# Patient Record
Sex: FEMALE | Race: WHITE | NOT HISPANIC OR LATINO | Employment: UNEMPLOYED | URBAN - METROPOLITAN AREA
[De-identification: names, ages, dates, MRNs, and addresses within clinical notes are randomized per-mention and may not be internally consistent; named-entity substitution may affect disease eponyms.]

---

## 2021-01-01 ENCOUNTER — HOSPITAL ENCOUNTER (OUTPATIENT)
Dept: GENERAL RADIOLOGY | Facility: HOSPITAL | Age: 51
Discharge: HOME | End: 2021-06-16

## 2021-01-01 ENCOUNTER — TELEPHONE (OUTPATIENT)
Dept: GYNECOLOGIC ONCOLOGY | Facility: CLINIC | Age: 51
End: 2021-01-01

## 2021-01-01 ENCOUNTER — HOSPITAL ENCOUNTER (OUTPATIENT)
Dept: ONCOLOGY | Facility: HOSPITAL | Age: 51
Setting detail: INFUSION SERIES
Discharge: HOME | End: 2021-08-13

## 2021-01-01 ENCOUNTER — PATIENT MESSAGE (OUTPATIENT)
Dept: GYNECOLOGIC ONCOLOGY | Facility: CLINIC | Age: 51
End: 2021-01-01

## 2021-01-01 ENCOUNTER — HOSPITAL ENCOUNTER (OUTPATIENT)
Dept: RADIATION ONCOLOGY | Facility: HOSPITAL | Age: 51
Discharge: HOME | End: 2021-07-20

## 2021-01-01 ENCOUNTER — HOSPITAL ENCOUNTER (OUTPATIENT)
Dept: RADIATION ONCOLOGY | Facility: HOSPITAL | Age: 51
Discharge: HOME | End: 2021-07-26

## 2021-01-01 ENCOUNTER — HOSPITAL ENCOUNTER (OUTPATIENT)
Dept: RADIATION ONCOLOGY | Facility: HOSPITAL | Age: 51
Discharge: HOME | End: 2021-07-21

## 2021-01-01 ENCOUNTER — DOCUMENTATION (OUTPATIENT)
Dept: GYNECOLOGIC ONCOLOGY | Facility: CLINIC | Age: 51
End: 2021-01-01

## 2021-01-01 ENCOUNTER — APPOINTMENT (OUTPATIENT)
Dept: GENERAL RADIOLOGY | Facility: HOSPITAL | Age: 51
End: 2021-01-01

## 2021-01-01 ENCOUNTER — HOSPITAL ENCOUNTER (OUTPATIENT)
Dept: RADIATION ONCOLOGY | Facility: HOSPITAL | Age: 51
Setting detail: RADIATION/ONCOLOGY SERIES
Discharge: HOME | End: 2021-07-12

## 2021-01-01 ENCOUNTER — HOSPITAL ENCOUNTER (OUTPATIENT)
Dept: RADIATION ONCOLOGY | Facility: HOSPITAL | Age: 51
Discharge: HOME | End: 2021-07-29

## 2021-01-01 ENCOUNTER — HOSPITAL ENCOUNTER (EMERGENCY)
Facility: HOSPITAL | Age: 51
Discharge: HOME | End: 2021-06-24
Attending: EMERGENCY MEDICINE | Admitting: EMERGENCY MEDICINE

## 2021-01-01 ENCOUNTER — HOSPITAL ENCOUNTER (OUTPATIENT)
Dept: RADIATION ONCOLOGY | Facility: HOSPITAL | Age: 51
Discharge: HOME | End: 2021-07-28

## 2021-01-01 ENCOUNTER — HOSPITAL ENCOUNTER (OUTPATIENT)
Dept: ONCOLOGY | Facility: HOSPITAL | Age: 51
Setting detail: INFUSION SERIES
Discharge: HOME | End: 2021-08-09

## 2021-01-01 ENCOUNTER — APPOINTMENT (OUTPATIENT)
Dept: CT IMAGING | Facility: HOSPITAL | Age: 51
End: 2021-01-01

## 2021-01-01 ENCOUNTER — OFFICE VISIT (OUTPATIENT)
Dept: GYNECOLOGIC ONCOLOGY | Facility: CLINIC | Age: 51
End: 2021-01-01

## 2021-01-01 ENCOUNTER — HOSPITAL ENCOUNTER (OUTPATIENT)
Dept: RADIATION ONCOLOGY | Facility: HOSPITAL | Age: 51
Discharge: HOME | End: 2021-07-12

## 2021-01-01 ENCOUNTER — ANESTHESIA EVENT (OUTPATIENT)
Dept: PERIOP | Facility: HOSPITAL | Age: 51
End: 2021-01-01

## 2021-01-01 ENCOUNTER — EDUCATION (OUTPATIENT)
Dept: ONCOLOGY | Facility: HOSPITAL | Age: 51
End: 2021-01-01

## 2021-01-01 ENCOUNTER — HOSPITAL ENCOUNTER (INPATIENT)
Facility: HOSPITAL | Age: 51
LOS: 1 days | Discharge: HOME | End: 2021-06-18
Attending: OBSTETRICS & GYNECOLOGY | Admitting: OBSTETRICS & GYNECOLOGY

## 2021-01-01 ENCOUNTER — LAB (OUTPATIENT)
Dept: LAB | Facility: HOSPITAL | Age: 51
End: 2021-01-01

## 2021-01-01 ENCOUNTER — HOSPITAL ENCOUNTER (OUTPATIENT)
Dept: RADIATION ONCOLOGY | Facility: HOSPITAL | Age: 51
Discharge: HOME | End: 2021-07-19

## 2021-01-01 ENCOUNTER — HOSPITAL ENCOUNTER (OUTPATIENT)
Dept: ONCOLOGY | Facility: HOSPITAL | Age: 51
Setting detail: INFUSION SERIES
Discharge: HOME | End: 2021-08-05

## 2021-01-01 ENCOUNTER — ANESTHESIA (OUTPATIENT)
Dept: PERIOP | Facility: HOSPITAL | Age: 51
End: 2021-01-01

## 2021-01-01 ENCOUNTER — HOSPITAL ENCOUNTER (OUTPATIENT)
Dept: RADIATION ONCOLOGY | Facility: HOSPITAL | Age: 51
Discharge: HOME | End: 2021-07-27

## 2021-01-01 ENCOUNTER — HOSPITAL ENCOUNTER (OUTPATIENT)
Dept: ONCOLOGY | Facility: HOSPITAL | Age: 51
Setting detail: INFUSION SERIES
Discharge: HOME | End: 2021-07-15

## 2021-01-01 ENCOUNTER — HOSPITAL ENCOUNTER (OUTPATIENT)
Dept: ONCOLOGY | Facility: HOSPITAL | Age: 51
Discharge: HOME | End: 2021-07-12
Admitting: OBSTETRICS & GYNECOLOGY

## 2021-01-01 ENCOUNTER — HOSPITAL ENCOUNTER (OUTPATIENT)
Dept: ONCOLOGY | Facility: HOSPITAL | Age: 51
Setting detail: INFUSION SERIES
Discharge: HOME | End: 2021-08-12

## 2021-01-01 ENCOUNTER — PRE-ADMISSION TESTING (OUTPATIENT)
Dept: PREADMISSION TESTING | Facility: HOSPITAL | Age: 51
End: 2021-01-01

## 2021-01-01 ENCOUNTER — TELEPHONE (OUTPATIENT)
Dept: RADIATION ONCOLOGY | Facility: HOSPITAL | Age: 51
End: 2021-01-01

## 2021-01-01 ENCOUNTER — HOSPITAL ENCOUNTER (OUTPATIENT)
Dept: RADIATION ONCOLOGY | Facility: HOSPITAL | Age: 51
Discharge: HOME | End: 2021-07-22

## 2021-01-01 ENCOUNTER — DOCUMENTATION (OUTPATIENT)
Dept: NUTRITION | Facility: HOSPITAL | Age: 51
End: 2021-01-01

## 2021-01-01 ENCOUNTER — APPOINTMENT (OUTPATIENT)
Dept: MRI IMAGING | Facility: HOSPITAL | Age: 51
End: 2021-01-01

## 2021-01-01 ENCOUNTER — READMISSION MANAGEMENT (OUTPATIENT)
Dept: CALL CENTER | Facility: HOSPITAL | Age: 51
End: 2021-01-01

## 2021-01-01 ENCOUNTER — HOSPITAL ENCOUNTER (OUTPATIENT)
Dept: CT IMAGING | Facility: HOSPITAL | Age: 51
Discharge: HOME | End: 2021-07-02
Admitting: OBSTETRICS & GYNECOLOGY

## 2021-01-01 ENCOUNTER — HOSPITAL ENCOUNTER (OUTPATIENT)
Dept: RADIATION ONCOLOGY | Facility: HOSPITAL | Age: 51
Discharge: HOME | End: 2021-07-23

## 2021-01-01 ENCOUNTER — HOSPITAL ENCOUNTER (OUTPATIENT)
Dept: RADIATION ONCOLOGY | Facility: HOSPITAL | Age: 51
Setting detail: RADIATION/ONCOLOGY SERIES
Discharge: HOME | End: 2021-08-01

## 2021-01-01 ENCOUNTER — APPOINTMENT (OUTPATIENT)
Dept: PREADMISSION TESTING | Facility: HOSPITAL | Age: 51
End: 2021-01-01

## 2021-01-01 ENCOUNTER — HOSPITAL ENCOUNTER (INPATIENT)
Facility: HOSPITAL | Age: 51
LOS: 5 days | Discharge: HOME | End: 2021-08-24
Attending: INTERNAL MEDICINE | Admitting: HOSPITALIST

## 2021-01-01 ENCOUNTER — HOSPITAL ENCOUNTER (OUTPATIENT)
Dept: ONCOLOGY | Facility: HOSPITAL | Age: 51
Setting detail: INFUSION SERIES
Discharge: HOME | End: 2021-08-06

## 2021-01-01 ENCOUNTER — HOSPITAL ENCOUNTER (OUTPATIENT)
Dept: ONCOLOGY | Facility: HOSPITAL | Age: 51
Setting detail: INFUSION SERIES
End: 2021-01-01

## 2021-01-01 ENCOUNTER — OFFICE VISIT (OUTPATIENT)
Dept: RADIATION ONCOLOGY | Facility: HOSPITAL | Age: 51
End: 2021-01-01

## 2021-01-01 ENCOUNTER — TELEPHONE (OUTPATIENT)
Dept: NEUROLOGY | Facility: CLINIC | Age: 51
End: 2021-01-01

## 2021-01-01 ENCOUNTER — TELEPHONE (OUTPATIENT)
Dept: SOCIAL WORK | Facility: HOSPITAL | Age: 51
End: 2021-01-01

## 2021-01-01 VITALS
WEIGHT: 221.3 LBS | DIASTOLIC BLOOD PRESSURE: 68 MMHG | BODY MASS INDEX: 32.78 KG/M2 | SYSTOLIC BLOOD PRESSURE: 121 MMHG | OXYGEN SATURATION: 97 % | RESPIRATION RATE: 18 BRPM | HEIGHT: 69 IN | HEART RATE: 110 BPM | TEMPERATURE: 97.8 F

## 2021-01-01 VITALS
HEIGHT: 69 IN | WEIGHT: 202 LBS | BODY MASS INDEX: 29.92 KG/M2 | HEART RATE: 89 BPM | TEMPERATURE: 98 F | SYSTOLIC BLOOD PRESSURE: 120 MMHG | OXYGEN SATURATION: 98 % | RESPIRATION RATE: 16 BRPM | DIASTOLIC BLOOD PRESSURE: 69 MMHG

## 2021-01-01 VITALS
HEIGHT: 69 IN | RESPIRATION RATE: 18 BRPM | SYSTOLIC BLOOD PRESSURE: 128 MMHG | TEMPERATURE: 97.6 F | DIASTOLIC BLOOD PRESSURE: 69 MMHG | BODY MASS INDEX: 29.92 KG/M2 | WEIGHT: 202 LBS | HEART RATE: 95 BPM

## 2021-01-01 VITALS
HEART RATE: 92 BPM | SYSTOLIC BLOOD PRESSURE: 138 MMHG | DIASTOLIC BLOOD PRESSURE: 76 MMHG | WEIGHT: 229 LBS | BODY MASS INDEX: 33.92 KG/M2 | HEIGHT: 69 IN | RESPIRATION RATE: 18 BRPM | TEMPERATURE: 96.6 F

## 2021-01-01 VITALS
RESPIRATION RATE: 14 BRPM | OXYGEN SATURATION: 99 % | DIASTOLIC BLOOD PRESSURE: 67 MMHG | HEART RATE: 83 BPM | HEIGHT: 69 IN | BODY MASS INDEX: 29.82 KG/M2 | TEMPERATURE: 95.7 F | SYSTOLIC BLOOD PRESSURE: 123 MMHG

## 2021-01-01 VITALS
OXYGEN SATURATION: 95 % | RESPIRATION RATE: 16 BRPM | HEART RATE: 103 BPM | SYSTOLIC BLOOD PRESSURE: 130 MMHG | HEIGHT: 69 IN | DIASTOLIC BLOOD PRESSURE: 74 MMHG | TEMPERATURE: 98 F | BODY MASS INDEX: 33.92 KG/M2 | WEIGHT: 229 LBS

## 2021-01-01 VITALS
TEMPERATURE: 97.9 F | SYSTOLIC BLOOD PRESSURE: 113 MMHG | HEART RATE: 87 BPM | DIASTOLIC BLOOD PRESSURE: 68 MMHG | RESPIRATION RATE: 18 BRPM

## 2021-01-01 VITALS
RESPIRATION RATE: 18 BRPM | SYSTOLIC BLOOD PRESSURE: 131 MMHG | OXYGEN SATURATION: 94 % | DIASTOLIC BLOOD PRESSURE: 68 MMHG | TEMPERATURE: 97.5 F | HEART RATE: 100 BPM | WEIGHT: 220.8 LBS | BODY MASS INDEX: 32.59 KG/M2

## 2021-01-01 VITALS
SYSTOLIC BLOOD PRESSURE: 144 MMHG | HEART RATE: 98 BPM | HEIGHT: 68 IN | RESPIRATION RATE: 18 BRPM | TEMPERATURE: 96.9 F | OXYGEN SATURATION: 98 % | WEIGHT: 238 LBS | BODY MASS INDEX: 36.07 KG/M2 | DIASTOLIC BLOOD PRESSURE: 95 MMHG

## 2021-01-01 VITALS
OXYGEN SATURATION: 97 % | HEIGHT: 69 IN | HEART RATE: 105 BPM | BODY MASS INDEX: 30.01 KG/M2 | RESPIRATION RATE: 18 BRPM | SYSTOLIC BLOOD PRESSURE: 119 MMHG | WEIGHT: 202.6 LBS | DIASTOLIC BLOOD PRESSURE: 66 MMHG | TEMPERATURE: 97.7 F

## 2021-01-01 VITALS
HEART RATE: 99 BPM | SYSTOLIC BLOOD PRESSURE: 126 MMHG | DIASTOLIC BLOOD PRESSURE: 71 MMHG | TEMPERATURE: 97.1 F | RESPIRATION RATE: 18 BRPM

## 2021-01-01 VITALS — BODY MASS INDEX: 33.99 KG/M2 | WEIGHT: 229.5 LBS | HEIGHT: 69 IN

## 2021-01-01 VITALS
RESPIRATION RATE: 18 BRPM | WEIGHT: 202 LBS | HEART RATE: 104 BPM | SYSTOLIC BLOOD PRESSURE: 126 MMHG | TEMPERATURE: 96.9 F | BODY MASS INDEX: 29.92 KG/M2 | OXYGEN SATURATION: 95 % | DIASTOLIC BLOOD PRESSURE: 72 MMHG | HEIGHT: 69 IN

## 2021-01-01 VITALS — WEIGHT: 210.5 LBS | BODY MASS INDEX: 30.05 KG/M2 | WEIGHT: 203.6 LBS | BODY MASS INDEX: 31.07 KG/M2

## 2021-01-01 VITALS
WEIGHT: 229 LBS | RESPIRATION RATE: 20 BRPM | HEIGHT: 69 IN | OXYGEN SATURATION: 94 % | TEMPERATURE: 98.8 F | SYSTOLIC BLOOD PRESSURE: 129 MMHG | DIASTOLIC BLOOD PRESSURE: 77 MMHG | HEART RATE: 108 BPM | BODY MASS INDEX: 33.92 KG/M2

## 2021-01-01 VITALS
HEIGHT: 69 IN | TEMPERATURE: 97.5 F | HEART RATE: 98 BPM | BODY MASS INDEX: 33.8 KG/M2 | SYSTOLIC BLOOD PRESSURE: 120 MMHG | DIASTOLIC BLOOD PRESSURE: 68 MMHG | RESPIRATION RATE: 16 BRPM

## 2021-01-01 VITALS
HEART RATE: 88 BPM | DIASTOLIC BLOOD PRESSURE: 65 MMHG | TEMPERATURE: 98 F | RESPIRATION RATE: 18 BRPM | SYSTOLIC BLOOD PRESSURE: 123 MMHG

## 2021-01-01 VITALS — HEART RATE: 91 BPM | SYSTOLIC BLOOD PRESSURE: 130 MMHG | DIASTOLIC BLOOD PRESSURE: 72 MMHG

## 2021-01-01 DIAGNOSIS — C54.1 ENDOMETRIAL CANCER (HCC): ICD-10-CM

## 2021-01-01 DIAGNOSIS — Z01.818 EXAMINATION PRIOR TO CHEMOTHERAPY: Primary | ICD-10-CM

## 2021-01-01 DIAGNOSIS — F41.9 ANXIETY: ICD-10-CM

## 2021-01-01 DIAGNOSIS — T45.1X5A ANEMIA DUE TO ANTINEOPLASTIC CHEMOTHERAPY: ICD-10-CM

## 2021-01-01 DIAGNOSIS — E86.0 DEHYDRATION: ICD-10-CM

## 2021-01-01 DIAGNOSIS — R42 DIZZINESS: ICD-10-CM

## 2021-01-01 DIAGNOSIS — C54.1 ENDOMETRIAL CANCER, FIGO STAGE IVB (HCC): ICD-10-CM

## 2021-01-01 DIAGNOSIS — N39.0 ACUTE UTI: Primary | ICD-10-CM

## 2021-01-01 DIAGNOSIS — I10 ESSENTIAL HYPERTENSION: ICD-10-CM

## 2021-01-01 DIAGNOSIS — C54.1 ENDOMETRIAL CANCER (HCC): Primary | ICD-10-CM

## 2021-01-01 DIAGNOSIS — F41.8 DEPRESSION WITH ANXIETY: ICD-10-CM

## 2021-01-01 DIAGNOSIS — D64.81 ANEMIA DUE TO ANTINEOPLASTIC CHEMOTHERAPY: ICD-10-CM

## 2021-01-01 DIAGNOSIS — G89.3 CANCER-RELATED PAIN: ICD-10-CM

## 2021-01-01 DIAGNOSIS — T45.1X5A ANEMIA DUE TO ANTINEOPLASTIC CHEMOTHERAPY: Primary | ICD-10-CM

## 2021-01-01 DIAGNOSIS — R53.1 GENERALIZED WEAKNESS: ICD-10-CM

## 2021-01-01 DIAGNOSIS — H53.9 VISION CHANGES: ICD-10-CM

## 2021-01-01 DIAGNOSIS — M79.89 SWELLING OF LOWER EXTREMITY: ICD-10-CM

## 2021-01-01 DIAGNOSIS — G89.3 CANCER RELATED PAIN: ICD-10-CM

## 2021-01-01 DIAGNOSIS — R13.10 DYSPHAGIA, UNSPECIFIED TYPE: ICD-10-CM

## 2021-01-01 DIAGNOSIS — E11.69 TYPE 2 DIABETES MELLITUS WITH OTHER SPECIFIED COMPLICATION, UNSPECIFIED WHETHER LONG TERM INSULIN USE (HCC): ICD-10-CM

## 2021-01-01 DIAGNOSIS — E11.69 TYPE 2 DIABETES MELLITUS WITH OTHER SPECIFIED COMPLICATION, UNSPECIFIED WHETHER LONG TERM INSULIN USE (HCC): Primary | ICD-10-CM

## 2021-01-01 DIAGNOSIS — Z79.4 TYPE 2 DIABETES MELLITUS WITHOUT COMPLICATION, WITH LONG-TERM CURRENT USE OF INSULIN (HCC): ICD-10-CM

## 2021-01-01 DIAGNOSIS — G89.18 POSTOPERATIVE PAIN: ICD-10-CM

## 2021-01-01 DIAGNOSIS — N95.1 MENOPAUSAL HOT FLUSHES: ICD-10-CM

## 2021-01-01 DIAGNOSIS — M79.10 MYALGIA: ICD-10-CM

## 2021-01-01 DIAGNOSIS — E43 SEVERE MALNUTRITION (HCC): ICD-10-CM

## 2021-01-01 DIAGNOSIS — E11.9 TYPE 2 DIABETES MELLITUS WITHOUT COMPLICATION, WITH LONG-TERM CURRENT USE OF INSULIN (HCC): ICD-10-CM

## 2021-01-01 DIAGNOSIS — T45.1X5A ALOPECIA DUE TO CYTOTOXIC DRUG: ICD-10-CM

## 2021-01-01 DIAGNOSIS — R62.7 FAILURE TO THRIVE IN ADULT: ICD-10-CM

## 2021-01-01 DIAGNOSIS — E83.42 HYPOMAGNESEMIA: ICD-10-CM

## 2021-01-01 DIAGNOSIS — D64.81 ANEMIA DUE TO ANTINEOPLASTIC CHEMOTHERAPY: Primary | ICD-10-CM

## 2021-01-01 DIAGNOSIS — R73.9 HYPERGLYCEMIA: ICD-10-CM

## 2021-01-01 DIAGNOSIS — D64.9 ACUTE ANEMIA: ICD-10-CM

## 2021-01-01 DIAGNOSIS — L65.8 ALOPECIA DUE TO CYTOTOXIC DRUG: ICD-10-CM

## 2021-01-01 DIAGNOSIS — G89.3 CANCER-RELATED PAIN: Primary | ICD-10-CM

## 2021-01-01 DIAGNOSIS — F51.02 ADJUSTMENT INSOMNIA: ICD-10-CM

## 2021-01-01 DIAGNOSIS — E87.6 HYPOKALEMIA: ICD-10-CM

## 2021-01-01 DIAGNOSIS — R63.4 WEIGHT LOSS: ICD-10-CM

## 2021-01-01 DIAGNOSIS — K59.00 CONSTIPATION, UNSPECIFIED CONSTIPATION TYPE: ICD-10-CM

## 2021-01-01 LAB
ABO GROUP BLD: NORMAL
ALBUMIN SERPL-MCNC: 2.8 G/DL (ref 3.5–5.2)
ALBUMIN SERPL-MCNC: 3 G/DL (ref 3.5–5.2)
ALBUMIN SERPL-MCNC: 3.1 G/DL (ref 3.5–5.2)
ALBUMIN SERPL-MCNC: 3.2 G/DL (ref 3.5–5.2)
ALBUMIN SERPL-MCNC: 3.3 G/DL (ref 3.5–5.2)
ALBUMIN SERPL-MCNC: 4.1 G/DL (ref 3.5–5.2)
ALBUMIN/GLOB SERPL: 0.9 G/DL
ALBUMIN/GLOB SERPL: 1 G/DL
ALBUMIN/GLOB SERPL: 1.5 G/DL
ALP SERPL-CCNC: 111 U/L (ref 39–117)
ALP SERPL-CCNC: 118 U/L (ref 39–117)
ALP SERPL-CCNC: 205 U/L (ref 39–117)
ALP SERPL-CCNC: 207 U/L (ref 39–117)
ALP SERPL-CCNC: 245 U/L (ref 39–117)
ALP SERPL-CCNC: 330 U/L (ref 39–117)
ALT SERPL W P-5'-P-CCNC: 14 U/L (ref 1–33)
ALT SERPL W P-5'-P-CCNC: 22 U/L (ref 1–33)
ALT SERPL W P-5'-P-CCNC: 7 U/L (ref 1–33)
ALT SERPL W P-5'-P-CCNC: 9 U/L (ref 1–33)
ANION GAP SERPL CALCULATED.3IONS-SCNC: 10 MMOL/L (ref 5–15)
ANION GAP SERPL CALCULATED.3IONS-SCNC: 11 MMOL/L (ref 5–15)
ANION GAP SERPL CALCULATED.3IONS-SCNC: 12 MMOL/L (ref 5–15)
ANION GAP SERPL CALCULATED.3IONS-SCNC: 12 MMOL/L (ref 5–15)
ANION GAP SERPL CALCULATED.3IONS-SCNC: 13 MMOL/L (ref 5–15)
ANION GAP SERPL CALCULATED.3IONS-SCNC: 14 MMOL/L (ref 5–15)
ANION GAP SERPL CALCULATED.3IONS-SCNC: 17 MMOL/L (ref 5–15)
ANION GAP SERPL CALCULATED.3IONS-SCNC: 17 MMOL/L (ref 5–15)
ANION GAP SERPL CALCULATED.3IONS-SCNC: 18 MMOL/L (ref 5–15)
ANION GAP SERPL CALCULATED.3IONS-SCNC: 19 MMOL/L (ref 5–15)
ANION GAP SERPL CALCULATED.3IONS-SCNC: 19 MMOL/L (ref 5–15)
ANION GAP SERPL CALCULATED.3IONS-SCNC: 8 MMOL/L (ref 5–15)
ANION GAP SERPL CALCULATED.3IONS-SCNC: 8 MMOL/L (ref 5–15)
ANION GAP SERPL CALCULATED.3IONS-SCNC: 9 MMOL/L (ref 5–15)
AST SERPL-CCNC: 13 U/L (ref 1–32)
AST SERPL-CCNC: 17 U/L (ref 1–32)
AST SERPL-CCNC: 18 U/L (ref 1–32)
AST SERPL-CCNC: 19 U/L (ref 1–32)
AST SERPL-CCNC: 22 U/L (ref 1–32)
AST SERPL-CCNC: 86 U/L (ref 1–32)
B-HCG UR QL: NEGATIVE
BACTERIA SPEC AEROBE CULT: NO GROWTH
BACTERIA SPEC AEROBE CULT: NORMAL
BACTERIA UR QL AUTO: ABNORMAL /HPF
BASOPHILS # BLD AUTO: 0.01 10*3/MM3 (ref 0–0.2)
BASOPHILS # BLD AUTO: 0.05 10*3/MM3 (ref 0–0.2)
BASOPHILS # BLD AUTO: 0.06 10*3/MM3 (ref 0–0.2)
BASOPHILS NFR BLD AUTO: 0.2 % (ref 0–1.5)
BASOPHILS NFR BLD AUTO: 0.4 % (ref 0–1.5)
BASOPHILS NFR BLD AUTO: 0.6 % (ref 0–1.5)
BH BB BLOOD EXPIRATION DATE: NORMAL
BH BB BLOOD TYPE BARCODE: 5100
BH BB BLOOD TYPE BARCODE: 9500
BH BB DISPENSE STATUS: NORMAL
BH BB PRODUCT CODE: NORMAL
BH BB UNIT NUMBER: NORMAL
BILIRUB SERPL-MCNC: 0.4 MG/DL (ref 0–1.2)
BILIRUB SERPL-MCNC: 0.6 MG/DL (ref 0–1.2)
BILIRUB SERPL-MCNC: 0.7 MG/DL (ref 0–1.2)
BILIRUB SERPL-MCNC: 0.8 MG/DL (ref 0–1.2)
BILIRUB SERPL-MCNC: 1.1 MG/DL (ref 0–1.2)
BILIRUB SERPL-MCNC: 1.4 MG/DL (ref 0–1.2)
BILIRUB UR QL STRIP: NEGATIVE
BLD GP AB SCN SERPL QL: NEGATIVE
BUN SERPL-MCNC: 10 MG/DL (ref 6–20)
BUN SERPL-MCNC: 11 MG/DL (ref 6–20)
BUN SERPL-MCNC: 12 MG/DL (ref 6–20)
BUN SERPL-MCNC: 13 MG/DL (ref 6–20)
BUN SERPL-MCNC: 18 MG/DL (ref 6–20)
BUN SERPL-MCNC: 7 MG/DL (ref 6–20)
BUN SERPL-MCNC: 8 MG/DL (ref 6–20)
BUN SERPL-MCNC: 9 MG/DL (ref 6–20)
BUN/CREAT SERPL: 13.5 (ref 7–25)
BUN/CREAT SERPL: 16.3 (ref 7–25)
BUN/CREAT SERPL: 16.3 (ref 7–25)
BUN/CREAT SERPL: 17.5 (ref 7–25)
BUN/CREAT SERPL: 17.6 (ref 7–25)
BUN/CREAT SERPL: 17.9 (ref 7–25)
BUN/CREAT SERPL: 17.9 (ref 7–25)
BUN/CREAT SERPL: 18.2 (ref 7–25)
BUN/CREAT SERPL: 19.4 (ref 7–25)
BUN/CREAT SERPL: 19.4 (ref 7–25)
BUN/CREAT SERPL: 21.2 (ref 7–25)
BUN/CREAT SERPL: 21.6 (ref 7–25)
BUN/CREAT SERPL: 22.5 (ref 7–25)
BUN/CREAT SERPL: 23.1 (ref 7–25)
BUN/CREAT SERPL: 25 (ref 7–25)
BUN/CREAT SERPL: 25 (ref 7–25)
BUN/CREAT SERPL: 26.5 (ref 7–25)
BUN/CREAT SERPL: 27 (ref 7–25)
BUN/CREAT SERPL: 28.6 (ref 7–25)
BUN/CREAT SERPL: 28.6 (ref 7–25)
BUN/CREAT SERPL: 32.7 (ref 7–25)
CA-I SERPL ISE-MCNC: 1.22 MMOL/L (ref 1.12–1.32)
CALCIUM SPEC-SCNC: 10.1 MG/DL (ref 8.6–10.5)
CALCIUM SPEC-SCNC: 8.1 MG/DL (ref 8.6–10.5)
CALCIUM SPEC-SCNC: 8.1 MG/DL (ref 8.6–10.5)
CALCIUM SPEC-SCNC: 8.2 MG/DL (ref 8.6–10.5)
CALCIUM SPEC-SCNC: 8.3 MG/DL (ref 8.6–10.5)
CALCIUM SPEC-SCNC: 8.4 MG/DL (ref 8.6–10.5)
CALCIUM SPEC-SCNC: 8.5 MG/DL (ref 8.6–10.5)
CALCIUM SPEC-SCNC: 8.5 MG/DL (ref 8.6–10.5)
CALCIUM SPEC-SCNC: 8.6 MG/DL (ref 8.6–10.5)
CALCIUM SPEC-SCNC: 8.6 MG/DL (ref 8.6–10.5)
CALCIUM SPEC-SCNC: 8.7 MG/DL (ref 8.6–10.5)
CALCIUM SPEC-SCNC: 8.9 MG/DL (ref 8.6–10.5)
CALCIUM SPEC-SCNC: 9.1 MG/DL (ref 8.6–10.5)
CALCIUM SPEC-SCNC: 9.2 MG/DL (ref 8.6–10.5)
CALCIUM SPEC-SCNC: 9.4 MG/DL (ref 8.6–10.5)
CANCER AG125 SERPL QL: 34.3 U/ML (ref 0–38.1)
CHLORIDE SERPL-SCNC: 100 MMOL/L (ref 98–107)
CHLORIDE SERPL-SCNC: 84 MMOL/L (ref 98–107)
CHLORIDE SERPL-SCNC: 85 MMOL/L (ref 98–107)
CHLORIDE SERPL-SCNC: 85 MMOL/L (ref 98–107)
CHLORIDE SERPL-SCNC: 86 MMOL/L (ref 98–107)
CHLORIDE SERPL-SCNC: 87 MMOL/L (ref 98–107)
CHLORIDE SERPL-SCNC: 87 MMOL/L (ref 98–107)
CHLORIDE SERPL-SCNC: 89 MMOL/L (ref 98–107)
CHLORIDE SERPL-SCNC: 89 MMOL/L (ref 98–107)
CHLORIDE SERPL-SCNC: 90 MMOL/L (ref 98–107)
CHLORIDE SERPL-SCNC: 91 MMOL/L (ref 98–107)
CHLORIDE SERPL-SCNC: 92 MMOL/L (ref 98–107)
CHLORIDE SERPL-SCNC: 96 MMOL/L (ref 98–107)
CK SERPL-CCNC: 53 U/L (ref 20–180)
CLARITY UR: ABNORMAL
CLARITY UR: CLEAR
CLARITY UR: CLEAR
CO2 SERPL-SCNC: 20 MMOL/L (ref 22–29)
CO2 SERPL-SCNC: 22 MMOL/L (ref 22–29)
CO2 SERPL-SCNC: 23 MMOL/L (ref 22–29)
CO2 SERPL-SCNC: 23 MMOL/L (ref 22–29)
CO2 SERPL-SCNC: 24 MMOL/L (ref 22–29)
CO2 SERPL-SCNC: 25 MMOL/L (ref 22–29)
CO2 SERPL-SCNC: 26 MMOL/L (ref 22–29)
CO2 SERPL-SCNC: 27 MMOL/L (ref 22–29)
CO2 SERPL-SCNC: 28 MMOL/L (ref 22–29)
CO2 SERPL-SCNC: 28 MMOL/L (ref 22–29)
CO2 SERPL-SCNC: 29 MMOL/L (ref 22–29)
COLOR UR: YELLOW
CREAT BLDA-MCNC: 0.5 MG/DL (ref 0.6–1.3)
CREAT BLDA-MCNC: 0.5 MG/DL (ref 0.6–1.3)
CREAT SERPL-MCNC: 0.33 MG/DL (ref 0.57–1)
CREAT SERPL-MCNC: 0.35 MG/DL (ref 0.57–1)
CREAT SERPL-MCNC: 0.36 MG/DL (ref 0.57–1)
CREAT SERPL-MCNC: 0.37 MG/DL (ref 0.57–1)
CREAT SERPL-MCNC: 0.37 MG/DL (ref 0.57–1)
CREAT SERPL-MCNC: 0.39 MG/DL (ref 0.57–1)
CREAT SERPL-MCNC: 0.4 MG/DL (ref 0.57–1)
CREAT SERPL-MCNC: 0.4 MG/DL (ref 0.57–1)
CREAT SERPL-MCNC: 0.42 MG/DL (ref 0.57–1)
CREAT SERPL-MCNC: 0.43 MG/DL (ref 0.57–1)
CREAT SERPL-MCNC: 0.43 MG/DL (ref 0.57–1)
CREAT SERPL-MCNC: 0.44 MG/DL (ref 0.57–1)
CREAT SERPL-MCNC: 0.49 MG/DL (ref 0.57–1)
CREAT SERPL-MCNC: 0.51 MG/DL (ref 0.57–1)
CREAT SERPL-MCNC: 0.52 MG/DL (ref 0.57–1)
CREAT SERPL-MCNC: 0.55 MG/DL (ref 0.57–1)
CREAT SERPL-MCNC: 0.55 MG/DL (ref 0.57–1)
CROSSMATCH INTERPRETATION: NORMAL
CYTO UR: NORMAL
D-LACTATE SERPL-SCNC: 2 MMOL/L (ref 0.5–2)
DEPRECATED RDW RBC AUTO: 39.4 FL (ref 37–54)
DEPRECATED RDW RBC AUTO: 39.5 FL (ref 37–54)
DEPRECATED RDW RBC AUTO: 50.5 FL (ref 37–54)
DEPRECATED RDW RBC AUTO: 53 FL (ref 37–54)
DEPRECATED RDW RBC AUTO: 53.1 FL (ref 37–54)
EOSINOPHIL # BLD AUTO: 0.05 10*3/MM3 (ref 0–0.4)
EOSINOPHIL # BLD AUTO: 0.13 10*3/MM3 (ref 0–0.4)
EOSINOPHIL # BLD AUTO: 0.19 10*3/MM3 (ref 0–0.4)
EOSINOPHIL NFR BLD AUTO: 1.1 % (ref 0.3–6.2)
EOSINOPHIL NFR BLD AUTO: 1.2 % (ref 0.3–6.2)
EOSINOPHIL NFR BLD AUTO: 1.8 % (ref 0.3–6.2)
ERYTHROCYTE [DISTWIDTH] IN BLOOD BY AUTOMATED COUNT: 13.1 % (ref 12.3–15.4)
ERYTHROCYTE [DISTWIDTH] IN BLOOD BY AUTOMATED COUNT: 13.3 % (ref 12.3–15.4)
ERYTHROCYTE [DISTWIDTH] IN BLOOD BY AUTOMATED COUNT: 15.9 % (ref 12.3–15.4)
ERYTHROCYTE [DISTWIDTH] IN BLOOD BY AUTOMATED COUNT: 17 % (ref 12.3–15.4)
ERYTHROCYTE [DISTWIDTH] IN BLOOD BY AUTOMATED COUNT: 17 % (ref 12.3–15.4)
ERYTHROCYTE [DISTWIDTH] IN BLOOD BY AUTOMATED COUNT: 17.6 % (ref 12.3–15.4)
ERYTHROCYTE [DISTWIDTH] IN BLOOD BY AUTOMATED COUNT: 19.3 % (ref 12.3–15.4)
ERYTHROCYTE [DISTWIDTH] IN BLOOD BY AUTOMATED COUNT: 20.4 % (ref 12.3–15.4)
ERYTHROCYTE [DISTWIDTH] IN BLOOD BY AUTOMATED COUNT: 22.3 % (ref 12.3–15.4)
FLUAV RNA RESP QL NAA+PROBE: NOT DETECTED
FLUBV RNA RESP QL NAA+PROBE: NOT DETECTED
GFR SERPL CREATININE-BSD FRML MDRD: 117 ML/MIN/1.73
GFR SERPL CREATININE-BSD FRML MDRD: 117 ML/MIN/1.73
GFR SERPL CREATININE-BSD FRML MDRD: 125 ML/MIN/1.73
GFR SERPL CREATININE-BSD FRML MDRD: 128 ML/MIN/1.73
GFR SERPL CREATININE-BSD FRML MDRD: 133 ML/MIN/1.73
GFR SERPL CREATININE-BSD FRML MDRD: >150 ML/MIN/1.73
GLOBULIN UR ELPH-MCNC: 2.7 GM/DL
GLOBULIN UR ELPH-MCNC: 3 GM/DL
GLOBULIN UR ELPH-MCNC: 3.1 GM/DL
GLOBULIN UR ELPH-MCNC: 3.4 GM/DL
GLOBULIN UR ELPH-MCNC: 3.6 GM/DL
GLOBULIN UR ELPH-MCNC: 3.7 GM/DL
GLUCOSE BLDC GLUCOMTR-MCNC: 153 MG/DL (ref 70–130)
GLUCOSE BLDC GLUCOMTR-MCNC: 172 MG/DL (ref 70–130)
GLUCOSE BLDC GLUCOMTR-MCNC: 172 MG/DL (ref 70–130)
GLUCOSE BLDC GLUCOMTR-MCNC: 173 MG/DL (ref 70–130)
GLUCOSE BLDC GLUCOMTR-MCNC: 176 MG/DL (ref 70–130)
GLUCOSE BLDC GLUCOMTR-MCNC: 182 MG/DL (ref 70–130)
GLUCOSE BLDC GLUCOMTR-MCNC: 192 MG/DL (ref 70–130)
GLUCOSE BLDC GLUCOMTR-MCNC: 193 MG/DL (ref 70–130)
GLUCOSE BLDC GLUCOMTR-MCNC: 195 MG/DL (ref 70–130)
GLUCOSE BLDC GLUCOMTR-MCNC: 195 MG/DL (ref 70–130)
GLUCOSE BLDC GLUCOMTR-MCNC: 198 MG/DL (ref 70–130)
GLUCOSE BLDC GLUCOMTR-MCNC: 201 MG/DL (ref 70–130)
GLUCOSE BLDC GLUCOMTR-MCNC: 202 MG/DL (ref 70–130)
GLUCOSE BLDC GLUCOMTR-MCNC: 204 MG/DL (ref 70–130)
GLUCOSE BLDC GLUCOMTR-MCNC: 211 MG/DL (ref 70–130)
GLUCOSE BLDC GLUCOMTR-MCNC: 212 MG/DL (ref 70–130)
GLUCOSE BLDC GLUCOMTR-MCNC: 214 MG/DL (ref 70–130)
GLUCOSE BLDC GLUCOMTR-MCNC: 215 MG/DL (ref 70–130)
GLUCOSE BLDC GLUCOMTR-MCNC: 226 MG/DL (ref 70–130)
GLUCOSE BLDC GLUCOMTR-MCNC: 228 MG/DL (ref 70–130)
GLUCOSE BLDC GLUCOMTR-MCNC: 235 MG/DL (ref 70–130)
GLUCOSE BLDC GLUCOMTR-MCNC: 235 MG/DL (ref 70–130)
GLUCOSE BLDC GLUCOMTR-MCNC: 236 MG/DL (ref 70–130)
GLUCOSE BLDC GLUCOMTR-MCNC: 241 MG/DL (ref 70–130)
GLUCOSE BLDC GLUCOMTR-MCNC: 249 MG/DL (ref 70–130)
GLUCOSE BLDC GLUCOMTR-MCNC: 265 MG/DL (ref 70–130)
GLUCOSE BLDC GLUCOMTR-MCNC: 273 MG/DL (ref 70–130)
GLUCOSE BLDC GLUCOMTR-MCNC: 275 MG/DL (ref 70–130)
GLUCOSE BLDC GLUCOMTR-MCNC: 278 MG/DL (ref 70–130)
GLUCOSE BLDC GLUCOMTR-MCNC: 293 MG/DL (ref 70–130)
GLUCOSE BLDC GLUCOMTR-MCNC: 297 MG/DL (ref 70–130)
GLUCOSE BLDC GLUCOMTR-MCNC: 313 MG/DL (ref 70–130)
GLUCOSE BLDC GLUCOMTR-MCNC: 337 MG/DL (ref 70–130)
GLUCOSE BLDC GLUCOMTR-MCNC: 350 MG/DL (ref 70–130)
GLUCOSE BLDC GLUCOMTR-MCNC: 359 MG/DL (ref 70–130)
GLUCOSE BLDC GLUCOMTR-MCNC: 381 MG/DL (ref 70–130)
GLUCOSE BLDC GLUCOMTR-MCNC: 386 MG/DL (ref 70–130)
GLUCOSE BLDC GLUCOMTR-MCNC: 390 MG/DL (ref 70–130)
GLUCOSE BLDC GLUCOMTR-MCNC: 435 MG/DL (ref 70–130)
GLUCOSE BLDC GLUCOMTR-MCNC: 438 MG/DL (ref 70–130)
GLUCOSE SERPL-MCNC: 156 MG/DL (ref 65–99)
GLUCOSE SERPL-MCNC: 174 MG/DL (ref 65–99)
GLUCOSE SERPL-MCNC: 178 MG/DL (ref 65–99)
GLUCOSE SERPL-MCNC: 183 MG/DL (ref 65–99)
GLUCOSE SERPL-MCNC: 188 MG/DL (ref 65–99)
GLUCOSE SERPL-MCNC: 189 MG/DL (ref 65–99)
GLUCOSE SERPL-MCNC: 209 MG/DL (ref 65–99)
GLUCOSE SERPL-MCNC: 211 MG/DL (ref 65–99)
GLUCOSE SERPL-MCNC: 214 MG/DL (ref 65–99)
GLUCOSE SERPL-MCNC: 215 MG/DL (ref 65–99)
GLUCOSE SERPL-MCNC: 215 MG/DL (ref 65–99)
GLUCOSE SERPL-MCNC: 222 MG/DL (ref 65–99)
GLUCOSE SERPL-MCNC: 223 MG/DL (ref 65–99)
GLUCOSE SERPL-MCNC: 246 MG/DL (ref 65–99)
GLUCOSE SERPL-MCNC: 263 MG/DL (ref 65–99)
GLUCOSE SERPL-MCNC: 273 MG/DL (ref 65–99)
GLUCOSE SERPL-MCNC: 285 MG/DL (ref 65–99)
GLUCOSE SERPL-MCNC: 331 MG/DL (ref 65–99)
GLUCOSE SERPL-MCNC: 362 MG/DL (ref 65–99)
GLUCOSE SERPL-MCNC: 382 MG/DL (ref 65–99)
GLUCOSE SERPL-MCNC: 485 MG/DL (ref 65–99)
GLUCOSE UR STRIP-MCNC: ABNORMAL MG/DL
HBA1C MFR BLD: 7.8 % (ref 4.8–5.6)
HBA1C MFR BLD: 8.4 % (ref 4.8–5.6)
HCT VFR BLD AUTO: 18.9 % (ref 34–46.6)
HCT VFR BLD AUTO: 18.9 % (ref 34–46.6)
HCT VFR BLD AUTO: 19.2 % (ref 34–46.6)
HCT VFR BLD AUTO: 20 % (ref 34–46.6)
HCT VFR BLD AUTO: 20.4 % (ref 34–46.6)
HCT VFR BLD AUTO: 21.1 % (ref 34–46.6)
HCT VFR BLD AUTO: 22.2 % (ref 34–46.6)
HCT VFR BLD AUTO: 24.3 % (ref 34–46.6)
HCT VFR BLD AUTO: 31.7 % (ref 34–46.6)
HCT VFR BLD AUTO: 39.2 % (ref 34–46.6)
HGB BLD-MCNC: 10.3 G/DL (ref 12–15.9)
HGB BLD-MCNC: 12.8 G/DL (ref 12–15.9)
HGB BLD-MCNC: 6.3 G/DL (ref 12–15.9)
HGB BLD-MCNC: 6.4 G/DL (ref 12–15.9)
HGB BLD-MCNC: 6.4 G/DL (ref 12–15.9)
HGB BLD-MCNC: 6.6 G/DL (ref 12–15.9)
HGB BLD-MCNC: 6.7 G/DL (ref 12–15.9)
HGB BLD-MCNC: 7.3 G/DL (ref 12–15.9)
HGB BLD-MCNC: 7.3 G/DL (ref 12–15.9)
HGB BLD-MCNC: 8 G/DL (ref 12–15.9)
HGB UR QL STRIP.AUTO: ABNORMAL
HGB UR QL STRIP.AUTO: ABNORMAL
HGB UR QL STRIP.AUTO: NEGATIVE
HYALINE CASTS UR QL AUTO: ABNORMAL /LPF
IMM GRANULOCYTES # BLD AUTO: 0.1 10*3/MM3 (ref 0–0.05)
IMM GRANULOCYTES # BLD AUTO: 0.18 10*3/MM3 (ref 0–0.05)
IMM GRANULOCYTES # BLD AUTO: 0.43 10*3/MM3 (ref 0–0.05)
IMM GRANULOCYTES NFR BLD AUTO: 0.9 % (ref 0–0.5)
IMM GRANULOCYTES NFR BLD AUTO: 1.7 % (ref 0–0.5)
IMM GRANULOCYTES NFR BLD AUTO: 10 % (ref 0–0.5)
INTERNAL NEGATIVE CONTROL: NORMAL
INTERNAL POSITIVE CONTROL: NORMAL
KETONES UR QL STRIP: ABNORMAL
KETONES UR QL STRIP: ABNORMAL
KETONES UR QL STRIP: NEGATIVE
LAB AP CARIS, ADDENDUM: NORMAL
LAB AP CASE REPORT: NORMAL
LAB AP CLINICAL INFORMATION: NORMAL
LAB AP DIAGNOSIS COMMENT: NORMAL
LEUKOCYTE ESTERASE UR QL STRIP.AUTO: ABNORMAL
LYMPHOCYTES # BLD AUTO: 0.41 10*3/MM3 (ref 0.7–3.1)
LYMPHOCYTES # BLD AUTO: 0.6 10*3/MM3 (ref 0.7–3.1)
LYMPHOCYTES # BLD AUTO: 0.7 10*3/MM3 (ref 0.7–3.1)
LYMPHOCYTES # BLD AUTO: 0.9 10*3/MM3 (ref 0.7–3.1)
LYMPHOCYTES # BLD AUTO: 1.3 10*3/MM3 (ref 0.7–3.1)
LYMPHOCYTES # BLD AUTO: 1.43 10*3/MM3 (ref 0.7–3.1)
LYMPHOCYTES # BLD AUTO: 1.66 10*3/MM3 (ref 0.7–3.1)
LYMPHOCYTES NFR BLD AUTO: 10.6 % (ref 19.6–45.3)
LYMPHOCYTES NFR BLD AUTO: 11.7 % (ref 19.6–45.3)
LYMPHOCYTES NFR BLD AUTO: 12 % (ref 19.6–45.3)
LYMPHOCYTES NFR BLD AUTO: 13.6 % (ref 19.6–45.3)
LYMPHOCYTES NFR BLD AUTO: 13.6 % (ref 19.6–45.3)
LYMPHOCYTES NFR BLD AUTO: 14.5 % (ref 19.6–45.3)
LYMPHOCYTES NFR BLD AUTO: 9.6 % (ref 19.6–45.3)
Lab: NORMAL
Lab: NORMAL
MAGNESIUM SERPL-MCNC: 1.5 MG/DL (ref 1.6–2.6)
MAGNESIUM SERPL-MCNC: 1.8 MG/DL (ref 1.6–2.6)
MAGNESIUM SERPL-MCNC: 1.9 MG/DL (ref 1.6–2.6)
MCH RBC QN AUTO: 25.3 PG (ref 26.6–33)
MCH RBC QN AUTO: 25.6 PG (ref 26.6–33)
MCH RBC QN AUTO: 26.4 PG (ref 26.6–33)
MCH RBC QN AUTO: 26.6 PG (ref 26.6–33)
MCH RBC QN AUTO: 26.9 PG (ref 26.6–33)
MCH RBC QN AUTO: 27.2 PG (ref 26.6–33)
MCH RBC QN AUTO: 27.4 PG (ref 26.6–33)
MCH RBC QN AUTO: 27.9 PG (ref 26.6–33)
MCH RBC QN AUTO: 28 PG (ref 26.6–33)
MCHC RBC AUTO-ENTMCNC: 32.5 G/DL (ref 31.5–35.7)
MCHC RBC AUTO-ENTMCNC: 32.7 G/DL (ref 31.5–35.7)
MCHC RBC AUTO-ENTMCNC: 32.7 G/DL (ref 31.5–35.7)
MCHC RBC AUTO-ENTMCNC: 32.8 G/DL (ref 31.5–35.7)
MCHC RBC AUTO-ENTMCNC: 32.9 G/DL (ref 31.5–35.7)
MCHC RBC AUTO-ENTMCNC: 32.9 G/DL (ref 31.5–35.7)
MCHC RBC AUTO-ENTMCNC: 33.7 G/DL (ref 31.5–35.7)
MCHC RBC AUTO-ENTMCNC: 33.9 G/DL (ref 31.5–35.7)
MCHC RBC AUTO-ENTMCNC: 34.5 G/DL (ref 31.5–35.7)
MCV RBC AUTO: 76.7 FL (ref 79–97)
MCV RBC AUTO: 78.1 FL (ref 79–97)
MCV RBC AUTO: 78.2 FL (ref 79–97)
MCV RBC AUTO: 79.3 FL (ref 79–97)
MCV RBC AUTO: 80.7 FL (ref 79–97)
MCV RBC AUTO: 81.9 FL (ref 79–97)
MCV RBC AUTO: 82.4 FL (ref 79–97)
MCV RBC AUTO: 84.7 FL (ref 79–97)
MCV RBC AUTO: 85.4 FL (ref 79–97)
MONOCYTES # BLD AUTO: 0.1 10*3/MM3 (ref 0.1–0.9)
MONOCYTES # BLD AUTO: 0.2 10*3/MM3 (ref 0.1–0.9)
MONOCYTES # BLD AUTO: 0.3 10*3/MM3 (ref 0.1–0.9)
MONOCYTES # BLD AUTO: 0.3 10*3/MM3 (ref 0.1–0.9)
MONOCYTES # BLD AUTO: 0.35 10*3/MM3 (ref 0.1–0.9)
MONOCYTES # BLD AUTO: 1.15 10*3/MM3 (ref 0.1–0.9)
MONOCYTES # BLD AUTO: 1.29 10*3/MM3 (ref 0.1–0.9)
MONOCYTES NFR BLD AUTO: 1.4 % (ref 5–12)
MONOCYTES NFR BLD AUTO: 1.6 % (ref 5–12)
MONOCYTES NFR BLD AUTO: 11 % (ref 5–12)
MONOCYTES NFR BLD AUTO: 11.2 % (ref 5–12)
MONOCYTES NFR BLD AUTO: 5.7 % (ref 5–12)
MONOCYTES NFR BLD AUTO: 6.6 % (ref 5–12)
MONOCYTES NFR BLD AUTO: 8.2 % (ref 5–12)
NEUTROPHILS NFR BLD AUTO: 11 10*3/MM3 (ref 1.7–7)
NEUTROPHILS NFR BLD AUTO: 3.03 10*3/MM3 (ref 1.7–7)
NEUTROPHILS NFR BLD AUTO: 3.8 10*3/MM3 (ref 1.7–7)
NEUTROPHILS NFR BLD AUTO: 4.6 10*3/MM3 (ref 1.7–7)
NEUTROPHILS NFR BLD AUTO: 6.3 10*3/MM3 (ref 1.7–7)
NEUTROPHILS NFR BLD AUTO: 7.49 10*3/MM3 (ref 1.7–7)
NEUTROPHILS NFR BLD AUTO: 70.8 % (ref 42.7–76)
NEUTROPHILS NFR BLD AUTO: 71.3 % (ref 42.7–76)
NEUTROPHILS NFR BLD AUTO: 71.9 % (ref 42.7–76)
NEUTROPHILS NFR BLD AUTO: 79.8 % (ref 42.7–76)
NEUTROPHILS NFR BLD AUTO: 8.25 10*3/MM3 (ref 1.7–7)
NEUTROPHILS NFR BLD AUTO: 82.6 % (ref 42.7–76)
NEUTROPHILS NFR BLD AUTO: 86.6 % (ref 42.7–76)
NEUTROPHILS NFR BLD AUTO: 87.8 % (ref 42.7–76)
NITRITE UR QL STRIP: NEGATIVE
NITRITE UR QL STRIP: NEGATIVE
NITRITE UR QL STRIP: POSITIVE
NRBC BLD AUTO-RTO: 0 /100 WBC (ref 0–0.2)
NRBC BLD AUTO-RTO: 0 /100 WBC (ref 0–0.2)
NRBC BLD AUTO-RTO: 1.2 /100 WBC (ref 0–0.2)
OSMOLALITY SERPL: 272 MOSM/KG (ref 275–295)
OSMOLALITY UR: 715 MOSM/KG (ref 300–1100)
PATH REPORT.ADDENDUM SPEC: NORMAL
PATH REPORT.FINAL DX SPEC: NORMAL
PATH REPORT.GROSS SPEC: NORMAL
PH UR STRIP.AUTO: 5.5 [PH] (ref 5–8)
PH UR STRIP.AUTO: 6.5 [PH] (ref 5–8)
PH UR STRIP.AUTO: 6.5 [PH] (ref 5–8)
PHOSPHATE SERPL-MCNC: 3.7 MG/DL (ref 2.5–4.5)
PLAT MORPH BLD: NORMAL
PLATELET # BLD AUTO: 100 10*3/MM3 (ref 140–450)
PLATELET # BLD AUTO: 108 10*3/MM3 (ref 140–450)
PLATELET # BLD AUTO: 135 10*3/MM3 (ref 140–450)
PLATELET # BLD AUTO: 150 10*3/MM3 (ref 140–450)
PLATELET # BLD AUTO: 177 10*3/MM3 (ref 140–450)
PLATELET # BLD AUTO: 229 10*3/MM3 (ref 140–450)
PLATELET # BLD AUTO: 385 10*3/MM3 (ref 140–450)
PLATELET # BLD AUTO: 465 10*3/MM3 (ref 140–450)
PLATELET # BLD AUTO: 494 10*3/MM3 (ref 140–450)
PMV BLD AUTO: 5.8 FL (ref 6–12)
PMV BLD AUTO: 5.9 FL (ref 6–12)
PMV BLD AUTO: 6.4 FL (ref 6–12)
PMV BLD AUTO: 6.7 FL (ref 6–12)
PMV BLD AUTO: 8.9 FL (ref 6–12)
PMV BLD AUTO: 9.4 FL (ref 6–12)
PMV BLD AUTO: 9.6 FL (ref 6–12)
PMV BLD AUTO: 9.6 FL (ref 6–12)
PMV BLD AUTO: 9.7 FL (ref 6–12)
POTASSIUM SERPL-SCNC: 3.3 MMOL/L (ref 3.5–5.2)
POTASSIUM SERPL-SCNC: 3.6 MMOL/L (ref 3.5–5.2)
POTASSIUM SERPL-SCNC: 3.7 MMOL/L (ref 3.5–5.2)
POTASSIUM SERPL-SCNC: 3.8 MMOL/L (ref 3.5–5.2)
POTASSIUM SERPL-SCNC: 3.8 MMOL/L (ref 3.5–5.2)
POTASSIUM SERPL-SCNC: 3.9 MMOL/L (ref 3.5–5.2)
POTASSIUM SERPL-SCNC: 4 MMOL/L (ref 3.5–5.2)
POTASSIUM SERPL-SCNC: 4.1 MMOL/L (ref 3.5–5.2)
POTASSIUM SERPL-SCNC: 4.2 MMOL/L (ref 3.5–5.2)
POTASSIUM SERPL-SCNC: 4.3 MMOL/L (ref 3.5–5.2)
POTASSIUM SERPL-SCNC: 4.4 MMOL/L (ref 3.5–5.2)
PROT SERPL-MCNC: 5.8 G/DL (ref 6–8.5)
PROT SERPL-MCNC: 6.1 G/DL (ref 6–8.5)
PROT SERPL-MCNC: 6.5 G/DL (ref 6–8.5)
PROT SERPL-MCNC: 6.8 G/DL (ref 6–8.5)
PROT SERPL-MCNC: 6.8 G/DL (ref 6–8.5)
PROT SERPL-MCNC: 7 G/DL (ref 6–8.5)
PROT UR QL STRIP: ABNORMAL
QT INTERVAL: 360 MS
QTC INTERVAL: 464 MS
RBC # BLD AUTO: 2.34 10*6/MM3 (ref 3.77–5.28)
RBC # BLD AUTO: 2.39 10*6/MM3 (ref 3.77–5.28)
RBC # BLD AUTO: 2.42 10*6/MM3 (ref 3.77–5.28)
RBC # BLD AUTO: 2.46 10*6/MM3 (ref 3.77–5.28)
RBC # BLD AUTO: 2.62 10*6/MM3 (ref 3.77–5.28)
RBC # BLD AUTO: 2.66 10*6/MM3 (ref 3.77–5.28)
RBC # BLD AUTO: 3.17 10*6/MM3 (ref 3.77–5.28)
RBC # BLD AUTO: 3.87 10*6/MM3 (ref 3.77–5.28)
RBC # BLD AUTO: 4.76 10*6/MM3 (ref 3.77–5.28)
RBC # UR: ABNORMAL /HPF
RBC MORPH BLD: NORMAL
REF LAB TEST METHOD: ABNORMAL
RH BLD: POSITIVE
SARS-COV-2 RDRP RESP QL NAA+PROBE: NORMAL
SARS-COV-2 RNA PNL SPEC NAA+PROBE: NOT DETECTED
SARS-COV-2 RNA RESP QL NAA+PROBE: NOT DETECTED
SODIUM SERPL-SCNC: 123 MMOL/L (ref 136–145)
SODIUM SERPL-SCNC: 123 MMOL/L (ref 136–145)
SODIUM SERPL-SCNC: 124 MMOL/L (ref 136–145)
SODIUM SERPL-SCNC: 124 MMOL/L (ref 136–145)
SODIUM SERPL-SCNC: 125 MMOL/L (ref 136–145)
SODIUM SERPL-SCNC: 126 MMOL/L (ref 136–145)
SODIUM SERPL-SCNC: 127 MMOL/L (ref 136–145)
SODIUM SERPL-SCNC: 128 MMOL/L (ref 136–145)
SODIUM SERPL-SCNC: 134 MMOL/L (ref 136–145)
SODIUM SERPL-SCNC: 136 MMOL/L (ref 136–145)
SODIUM SERPL-SCNC: 139 MMOL/L (ref 136–145)
SODIUM UR-SCNC: 34 MMOL/L
SP GR UR STRIP: 1.03 (ref 1–1.03)
SP GR UR STRIP: 1.05 (ref 1–1.03)
SP GR UR STRIP: >=1.03 (ref 1–1.03)
SQUAMOUS #/AREA URNS HPF: ABNORMAL /HPF
T&S EXPIRATION DATE: NORMAL
TSH SERPL DL<=0.05 MIU/L-ACNC: 18.69 UIU/ML (ref 0.27–4.2)
UNIT  ABO: NORMAL
UNIT  RH: NORMAL
UROBILINOGEN UR QL STRIP: ABNORMAL
WBC # BLD AUTO: 10.5 10*3/MM3 (ref 3.4–10.8)
WBC # BLD AUTO: 11.48 10*3/MM3 (ref 3.4–10.8)
WBC # BLD AUTO: 12.5 10*3/MM3 (ref 3.4–10.8)
WBC # BLD AUTO: 3.72 10*3/MM3 (ref 3.4–10.8)
WBC # BLD AUTO: 4.28 10*3/MM3 (ref 3.4–10.8)
WBC # BLD AUTO: 4.7 10*3/MM3 (ref 3.4–10.8)
WBC # BLD AUTO: 5.18 10*3/MM3 (ref 3.4–10.8)
WBC # BLD AUTO: 5.6 10*3/MM3 (ref 3.4–10.8)
WBC # BLD AUTO: 7.3 10*3/MM3 (ref 3.4–10.8)
WBC MORPH BLD: NORMAL
WBC UR QL AUTO: ABNORMAL /HPF

## 2021-01-01 PROCEDURE — 86900 BLOOD TYPING SEROLOGIC ABO: CPT

## 2021-01-01 PROCEDURE — 36415 COLL VENOUS BLD VENIPUNCTURE: CPT

## 2021-01-01 PROCEDURE — 25010000002 PALONOSETRON 0.25 MG/5ML SOLUTION PREFILLED SYRINGE: Performed by: OBSTETRICS & GYNECOLOGY

## 2021-01-01 PROCEDURE — C9803 HOPD COVID-19 SPEC COLLECT: HCPCS

## 2021-01-01 PROCEDURE — 80048 BASIC METABOLIC PNL TOTAL CA: CPT | Performed by: INTERNAL MEDICINE

## 2021-01-01 PROCEDURE — 99239 HOSP IP/OBS DSCHRG MGMT >30: CPT | Performed by: HOSPITALIST

## 2021-01-01 PROCEDURE — 82565 ASSAY OF CREATININE: CPT

## 2021-01-01 PROCEDURE — 63710000001 INSULIN LISPRO (HUMAN) PER 5 UNITS: Performed by: HOSPITALIST

## 2021-01-01 PROCEDURE — 86923 COMPATIBILITY TEST ELECTRIC: CPT

## 2021-01-01 PROCEDURE — 25010000003 LEVETIRACETAM IN NACL 0.75% 1000 MG/100ML SOLUTION: Performed by: INTERNAL MEDICINE

## 2021-01-01 PROCEDURE — 77290 THER RAD SIMULAJ FIELD CPLX: CPT | Performed by: RADIOLOGY

## 2021-01-01 PROCEDURE — 82962 GLUCOSE BLOOD TEST: CPT

## 2021-01-01 PROCEDURE — 96367 TX/PROPH/DG ADDL SEQ IV INF: CPT

## 2021-01-01 PROCEDURE — 77412 RADIATION TX DELIVERY LVL 3: CPT | Performed by: RADIOLOGY

## 2021-01-01 PROCEDURE — U0004 COV-19 TEST NON-CDC HGH THRU: HCPCS

## 2021-01-01 PROCEDURE — 74230 X-RAY XM SWLNG FUNCJ C+: CPT

## 2021-01-01 PROCEDURE — 77387 GUIDANCE FOR RADJ TX DLVR: CPT | Performed by: RADIOLOGY

## 2021-01-01 PROCEDURE — 80053 COMPREHEN METABOLIC PANEL: CPT | Performed by: OBSTETRICS & GYNECOLOGY

## 2021-01-01 PROCEDURE — 80048 BASIC METABOLIC PNL TOTAL CA: CPT | Performed by: HOSPITALIST

## 2021-01-01 PROCEDURE — 93005 ELECTROCARDIOGRAM TRACING: CPT

## 2021-01-01 PROCEDURE — 63710000001 INSULIN LISPRO (HUMAN) PER 5 UNITS: Performed by: INTERNAL MEDICINE

## 2021-01-01 PROCEDURE — 25010000002 CEFTRIAXONE PER 250 MG: Performed by: NURSE PRACTITIONER

## 2021-01-01 PROCEDURE — C1758 CATHETER, URETERAL: HCPCS | Performed by: OBSTETRICS & GYNECOLOGY

## 2021-01-01 PROCEDURE — 36430 TRANSFUSION BLD/BLD COMPNT: CPT

## 2021-01-01 PROCEDURE — 0 IOPAMIDOL PER 1 ML: Performed by: INTERNAL MEDICINE

## 2021-01-01 PROCEDURE — 25010000002 DEXAMETHASONE PER 1 MG: Performed by: INTERNAL MEDICINE

## 2021-01-01 PROCEDURE — 77280 THER RAD SIMULAJ FIELD SMPL: CPT | Performed by: RADIOLOGY

## 2021-01-01 PROCEDURE — 99215 OFFICE O/P EST HI 40 MIN: CPT | Performed by: OBSTETRICS & GYNECOLOGY

## 2021-01-01 PROCEDURE — 25010000002 NEOSTIGMINE 10 MG/10ML SOLUTION: Performed by: NURSE ANESTHETIST, CERTIFIED REGISTERED

## 2021-01-01 PROCEDURE — 96365 THER/PROPH/DIAG IV INF INIT: CPT

## 2021-01-01 PROCEDURE — 99233 SBSQ HOSP IP/OBS HIGH 50: CPT | Performed by: OBSTETRICS & GYNECOLOGY

## 2021-01-01 PROCEDURE — 86900 BLOOD TYPING SEROLOGIC ABO: CPT | Performed by: NURSE PRACTITIONER

## 2021-01-01 PROCEDURE — 86901 BLOOD TYPING SEROLOGIC RH(D): CPT | Performed by: OBSTETRICS & GYNECOLOGY

## 2021-01-01 PROCEDURE — 87086 URINE CULTURE/COLONY COUNT: CPT | Performed by: EMERGENCY MEDICINE

## 2021-01-01 PROCEDURE — 8E0W4CZ ROBOTIC ASSISTED PROCEDURE OF TRUNK REGION, PERCUTANEOUS ENDOSCOPIC APPROACH: ICD-10-PCS | Performed by: OBSTETRICS & GYNECOLOGY

## 2021-01-01 PROCEDURE — 85025 COMPLETE CBC W/AUTO DIFF WBC: CPT | Performed by: OBSTETRICS & GYNECOLOGY

## 2021-01-01 PROCEDURE — 52332 CYSTOSCOPY AND TREATMENT: CPT | Performed by: OBSTETRICS & GYNECOLOGY

## 2021-01-01 PROCEDURE — 85007 BL SMEAR W/DIFF WBC COUNT: CPT | Performed by: INTERNAL MEDICINE

## 2021-01-01 PROCEDURE — 63710000001 INSULIN REGULAR HUMAN PER 5 UNITS: Performed by: OBSTETRICS & GYNECOLOGY

## 2021-01-01 PROCEDURE — 81025 URINE PREGNANCY TEST: CPT | Performed by: OBSTETRICS & GYNECOLOGY

## 2021-01-01 PROCEDURE — 85018 HEMOGLOBIN: CPT | Performed by: OBSTETRICS & GYNECOLOGY

## 2021-01-01 PROCEDURE — 85025 COMPLETE CBC W/AUTO DIFF WBC: CPT | Performed by: EMERGENCY MEDICINE

## 2021-01-01 PROCEDURE — 85025 COMPLETE CBC W/AUTO DIFF WBC: CPT

## 2021-01-01 PROCEDURE — 96413 CHEMO IV INFUSION 1 HR: CPT

## 2021-01-01 PROCEDURE — 63710000001 INSULIN LISPRO (HUMAN) PER 5 UNITS: Performed by: STUDENT IN AN ORGANIZED HEALTH CARE EDUCATION/TRAINING PROGRAM

## 2021-01-01 PROCEDURE — 83036 HEMOGLOBIN GLYCOSYLATED A1C: CPT | Performed by: INTERNAL MEDICINE

## 2021-01-01 PROCEDURE — 71046 X-RAY EXAM CHEST 2 VIEWS: CPT

## 2021-01-01 PROCEDURE — 25010000002 ONDANSETRON PER 1 MG: Performed by: PHYSICIAN ASSISTANT

## 2021-01-01 PROCEDURE — 99232 SBSQ HOSP IP/OBS MODERATE 35: CPT | Performed by: HOSPITALIST

## 2021-01-01 PROCEDURE — 25010000002 DIPHENHYDRAMINE PER 50 MG: Performed by: OBSTETRICS & GYNECOLOGY

## 2021-01-01 PROCEDURE — 86850 RBC ANTIBODY SCREEN: CPT | Performed by: NURSE PRACTITIONER

## 2021-01-01 PROCEDURE — 80053 COMPREHEN METABOLIC PANEL: CPT | Performed by: EMERGENCY MEDICINE

## 2021-01-01 PROCEDURE — 80053 COMPREHEN METABOLIC PANEL: CPT | Performed by: INTERNAL MEDICINE

## 2021-01-01 PROCEDURE — 83735 ASSAY OF MAGNESIUM: CPT | Performed by: EMERGENCY MEDICINE

## 2021-01-01 PROCEDURE — 96375 TX/PRO/DX INJ NEW DRUG ADDON: CPT

## 2021-01-01 PROCEDURE — 63710000001 INSULIN REGULAR HUMAN PER 5 UNITS: Performed by: ANESTHESIOLOGY

## 2021-01-01 PROCEDURE — 86850 RBC ANTIBODY SCREEN: CPT

## 2021-01-01 PROCEDURE — 83605 ASSAY OF LACTIC ACID: CPT | Performed by: INTERNAL MEDICINE

## 2021-01-01 PROCEDURE — P9016 RBC LEUKOCYTES REDUCED: HCPCS

## 2021-01-01 PROCEDURE — 88307 TISSUE EXAM BY PATHOLOGIST: CPT | Performed by: OBSTETRICS & GYNECOLOGY

## 2021-01-01 PROCEDURE — 83930 ASSAY OF BLOOD OSMOLALITY: CPT | Performed by: PHYSICIAN ASSISTANT

## 2021-01-01 PROCEDURE — 0UT94ZZ RESECTION OF UTERUS, PERCUTANEOUS ENDOSCOPIC APPROACH: ICD-10-PCS | Performed by: OBSTETRICS & GYNECOLOGY

## 2021-01-01 PROCEDURE — 86900 BLOOD TYPING SEROLOGIC ABO: CPT | Performed by: PHYSICIAN ASSISTANT

## 2021-01-01 PROCEDURE — 77336 RADIATION PHYSICS CONSULT: CPT | Performed by: RADIOLOGY

## 2021-01-01 PROCEDURE — 71045 X-RAY EXAM CHEST 1 VIEW: CPT

## 2021-01-01 PROCEDURE — 92611 MOTION FLUOROSCOPY/SWALLOW: CPT

## 2021-01-01 PROCEDURE — 96360 HYDRATION IV INFUSION INIT: CPT

## 2021-01-01 PROCEDURE — 25010000002 ONDANSETRON PER 1 MG: Performed by: NURSE ANESTHETIST, CERTIFIED REGISTERED

## 2021-01-01 PROCEDURE — 96372 THER/PROPH/DIAG INJ SC/IM: CPT

## 2021-01-01 PROCEDURE — 63710000001 INSULIN LISPRO (HUMAN) PER 5 UNITS: Performed by: OBSTETRICS & GYNECOLOGY

## 2021-01-01 PROCEDURE — 99024 POSTOP FOLLOW-UP VISIT: CPT | Performed by: OBSTETRICS & GYNECOLOGY

## 2021-01-01 PROCEDURE — 25010000002 CEFTRIAXONE PER 250 MG: Performed by: EMERGENCY MEDICINE

## 2021-01-01 PROCEDURE — 86304 IMMUNOASSAY TUMOR CA 125: CPT | Performed by: OBSTETRICS & GYNECOLOGY

## 2021-01-01 PROCEDURE — 25010000002 DEXAMETHASONE PER 1 MG: Performed by: NURSE ANESTHETIST, CERTIFIED REGISTERED

## 2021-01-01 PROCEDURE — 0DNW4ZZ RELEASE PERITONEUM, PERCUTANEOUS ENDOSCOPIC APPROACH: ICD-10-PCS | Performed by: OBSTETRICS & GYNECOLOGY

## 2021-01-01 PROCEDURE — 80053 COMPREHEN METABOLIC PANEL: CPT

## 2021-01-01 PROCEDURE — 25010000002 FOSAPREPITANT PER 1 MG: Performed by: OBSTETRICS & GYNECOLOGY

## 2021-01-01 PROCEDURE — 86901 BLOOD TYPING SEROLOGIC RH(D): CPT | Performed by: NURSE PRACTITIONER

## 2021-01-01 PROCEDURE — 25010000002 CARBOPLATIN PER 50 MG: Performed by: OBSTETRICS & GYNECOLOGY

## 2021-01-01 PROCEDURE — 71260 CT THORAX DX C+: CPT

## 2021-01-01 PROCEDURE — 70470 CT HEAD/BRAIN W/O & W/DYE: CPT

## 2021-01-01 PROCEDURE — 96417 CHEMO IV INFUS EACH ADDL SEQ: CPT

## 2021-01-01 PROCEDURE — 58575 LAPS TOT HYST RESJ MAL: CPT | Performed by: OBSTETRICS & GYNECOLOGY

## 2021-01-01 PROCEDURE — 85014 HEMATOCRIT: CPT | Performed by: OBSTETRICS & GYNECOLOGY

## 2021-01-01 PROCEDURE — 86901 BLOOD TYPING SEROLOGIC RH(D): CPT

## 2021-01-01 PROCEDURE — 25010000002 IOPAMIDOL 61 % SOLUTION: Performed by: OBSTETRICS & GYNECOLOGY

## 2021-01-01 PROCEDURE — 81001 URINALYSIS AUTO W/SCOPE: CPT | Performed by: EMERGENCY MEDICINE

## 2021-01-01 PROCEDURE — 83036 HEMOGLOBIN GLYCOSYLATED A1C: CPT | Performed by: OBSTETRICS & GYNECOLOGY

## 2021-01-01 PROCEDURE — 0UT24ZZ RESECTION OF BILATERAL OVARIES, PERCUTANEOUS ENDOSCOPIC APPROACH: ICD-10-PCS | Performed by: OBSTETRICS & GYNECOLOGY

## 2021-01-01 PROCEDURE — 97165 OT EVAL LOW COMPLEX 30 MIN: CPT

## 2021-01-01 PROCEDURE — 0TJB8ZZ INSPECTION OF BLADDER, VIA NATURAL OR ARTIFICIAL OPENING ENDOSCOPIC: ICD-10-PCS | Performed by: OBSTETRICS & GYNECOLOGY

## 2021-01-01 PROCEDURE — 71275 CT ANGIOGRAPHY CHEST: CPT

## 2021-01-01 PROCEDURE — 58575 LAPS TOT HYST RESJ MAL: CPT | Performed by: PHYSICIAN ASSISTANT

## 2021-01-01 PROCEDURE — 25010000002 PACLITAXEL PER 1 MG: Performed by: OBSTETRICS & GYNECOLOGY

## 2021-01-01 PROCEDURE — 36416 COLLJ CAPILLARY BLOOD SPEC: CPT

## 2021-01-01 PROCEDURE — 99231 SBSQ HOSP IP/OBS SF/LOW 25: CPT | Performed by: OBSTETRICS & GYNECOLOGY

## 2021-01-01 PROCEDURE — 63710000001 INSULIN LISPRO (HUMAN) PER 5 UNITS: Performed by: PHYSICIAN ASSISTANT

## 2021-01-01 PROCEDURE — 25010000002 HEPARIN (PORCINE) PER 1000 UNITS: Performed by: OBSTETRICS & GYNECOLOGY

## 2021-01-01 PROCEDURE — G0463 HOSPITAL OUTPT CLINIC VISIT: HCPCS | Performed by: RADIOLOGY

## 2021-01-01 PROCEDURE — 83935 ASSAY OF URINE OSMOLALITY: CPT | Performed by: PHYSICIAN ASSISTANT

## 2021-01-01 PROCEDURE — 87086 URINE CULTURE/COLONY COUNT: CPT | Performed by: INTERNAL MEDICINE

## 2021-01-01 PROCEDURE — 96415 CHEMO IV INFUSION ADDL HR: CPT

## 2021-01-01 PROCEDURE — 77417 THER RADIOLOGY PORT IMAGE(S): CPT | Performed by: RADIOLOGY

## 2021-01-01 PROCEDURE — 86850 RBC ANTIBODY SCREEN: CPT | Performed by: OBSTETRICS & GYNECOLOGY

## 2021-01-01 PROCEDURE — 0UT74ZZ RESECTION OF BILATERAL FALLOPIAN TUBES, PERCUTANEOUS ENDOSCOPIC APPROACH: ICD-10-PCS | Performed by: OBSTETRICS & GYNECOLOGY

## 2021-01-01 PROCEDURE — 25010000002 ENOXAPARIN PER 10 MG: Performed by: PHYSICIAN ASSISTANT

## 2021-01-01 PROCEDURE — 93010 ELECTROCARDIOGRAM REPORT: CPT | Performed by: INTERNAL MEDICINE

## 2021-01-01 PROCEDURE — 77295 3-D RADIOTHERAPY PLAN: CPT | Performed by: RADIOLOGY

## 2021-01-01 PROCEDURE — 86901 BLOOD TYPING SEROLOGIC RH(D): CPT | Performed by: PHYSICIAN ASSISTANT

## 2021-01-01 PROCEDURE — 77300 RADIATION THERAPY DOSE PLAN: CPT | Performed by: RADIOLOGY

## 2021-01-01 PROCEDURE — 94799 UNLISTED PULMONARY SVC/PX: CPT

## 2021-01-01 PROCEDURE — 99233 SBSQ HOSP IP/OBS HIGH 50: CPT | Performed by: HOSPITALIST

## 2021-01-01 PROCEDURE — 85025 COMPLETE CBC W/AUTO DIFF WBC: CPT | Performed by: INTERNAL MEDICINE

## 2021-01-01 PROCEDURE — 88331 PATH CONSLTJ SURG 1 BLK 1SPC: CPT | Performed by: PATHOLOGY

## 2021-01-01 PROCEDURE — 74177 CT ABD & PELVIS W/CONTRAST: CPT

## 2021-01-01 PROCEDURE — 99255 IP/OBS CONSLTJ NEW/EST HI 80: CPT | Performed by: OBSTETRICS & GYNECOLOGY

## 2021-01-01 PROCEDURE — 25010000002 ONDANSETRON PER 1 MG: Performed by: EMERGENCY MEDICINE

## 2021-01-01 PROCEDURE — 25010000002 MAGNESIUM SULFATE IN D5W 1G/100ML (PREMIX) 1-5 GM/100ML-% SOLUTION: Performed by: EMERGENCY MEDICINE

## 2021-01-01 PROCEDURE — 87636 SARSCOV2 & INF A&B AMP PRB: CPT | Performed by: EMERGENCY MEDICINE

## 2021-01-01 PROCEDURE — 99284 EMERGENCY DEPT VISIT MOD MDM: CPT

## 2021-01-01 PROCEDURE — 63710000001 INSULIN LISPRO (HUMAN) PER 5 UNITS: Performed by: ANESTHESIOLOGY

## 2021-01-01 PROCEDURE — 87635 SARS-COV-2 COVID-19 AMP PRB: CPT | Performed by: NURSE PRACTITIONER

## 2021-01-01 PROCEDURE — 25010000002 FENTANYL CITRATE (PF) 50 MCG/ML SOLUTION: Performed by: NURSE ANESTHETIST, CERTIFIED REGISTERED

## 2021-01-01 PROCEDURE — 86900 BLOOD TYPING SEROLOGIC ABO: CPT | Performed by: OBSTETRICS & GYNECOLOGY

## 2021-01-01 PROCEDURE — 85027 COMPLETE CBC AUTOMATED: CPT | Performed by: PHYSICIAN ASSISTANT

## 2021-01-01 PROCEDURE — 36591 DRAW BLOOD OFF VENOUS DEVICE: CPT

## 2021-01-01 PROCEDURE — 82550 ASSAY OF CK (CPK): CPT | Performed by: EMERGENCY MEDICINE

## 2021-01-01 PROCEDURE — 88305 TISSUE EXAM BY PATHOLOGIST: CPT | Performed by: OBSTETRICS & GYNECOLOGY

## 2021-01-01 PROCEDURE — 99223 1ST HOSP IP/OBS HIGH 75: CPT | Performed by: INTERNAL MEDICINE

## 2021-01-01 PROCEDURE — 84300 ASSAY OF URINE SODIUM: CPT | Performed by: PHYSICIAN ASSISTANT

## 2021-01-01 PROCEDURE — 77334 RADIATION TREATMENT AID(S): CPT | Performed by: RADIOLOGY

## 2021-01-01 PROCEDURE — 85027 COMPLETE CBC AUTOMATED: CPT | Performed by: HOSPITALIST

## 2021-01-01 PROCEDURE — 97161 PT EVAL LOW COMPLEX 20 MIN: CPT

## 2021-01-01 PROCEDURE — 25010000002 HYDROMORPHONE PER 4 MG: Performed by: EMERGENCY MEDICINE

## 2021-01-01 PROCEDURE — 88309 TISSUE EXAM BY PATHOLOGIST: CPT | Performed by: OBSTETRICS & GYNECOLOGY

## 2021-01-01 PROCEDURE — G0463 HOSPITAL OUTPT CLINIC VISIT: HCPCS

## 2021-01-01 PROCEDURE — 0DBW4ZZ EXCISION OF PERITONEUM, PERCUTANEOUS ENDOSCOPIC APPROACH: ICD-10-PCS | Performed by: OBSTETRICS & GYNECOLOGY

## 2021-01-01 PROCEDURE — 84100 ASSAY OF PHOSPHORUS: CPT | Performed by: INTERNAL MEDICINE

## 2021-01-01 PROCEDURE — 25010000002 MICAFUNGIN SODIUM 100 MG RECONSTITUTED SOLUTION: Performed by: INTERNAL MEDICINE

## 2021-01-01 PROCEDURE — 83735 ASSAY OF MAGNESIUM: CPT | Performed by: INTERNAL MEDICINE

## 2021-01-01 PROCEDURE — 25010000002 PROPOFOL 10 MG/ML EMULSION: Performed by: NURSE ANESTHETIST, CERTIFIED REGISTERED

## 2021-01-01 PROCEDURE — 84443 ASSAY THYROID STIM HORMONE: CPT | Performed by: PHYSICIAN ASSISTANT

## 2021-01-01 PROCEDURE — 92610 EVALUATE SWALLOWING FUNCTION: CPT

## 2021-01-01 PROCEDURE — 99205 OFFICE O/P NEW HI 60 MIN: CPT | Performed by: OBSTETRICS & GYNECOLOGY

## 2021-01-01 PROCEDURE — 74240 X-RAY XM UPR GI TRC 1CNTRST: CPT

## 2021-01-01 PROCEDURE — 81001 URINALYSIS AUTO W/SCOPE: CPT | Performed by: INTERNAL MEDICINE

## 2021-01-01 PROCEDURE — 25010000002 DEXAMETHASONE SODIUM PHOSPHATE 100 MG/10ML SOLUTION: Performed by: OBSTETRICS & GYNECOLOGY

## 2021-01-01 PROCEDURE — 82330 ASSAY OF CALCIUM: CPT | Performed by: INTERNAL MEDICINE

## 2021-01-01 PROCEDURE — 81001 URINALYSIS AUTO W/SCOPE: CPT | Performed by: PHYSICIAN ASSISTANT

## 2021-01-01 PROCEDURE — 86850 RBC ANTIBODY SCREEN: CPT | Performed by: PHYSICIAN ASSISTANT

## 2021-01-01 RX ORDER — HYDROMORPHONE HYDROCHLORIDE 1 MG/ML
0.5 INJECTION, SOLUTION INTRAMUSCULAR; INTRAVENOUS; SUBCUTANEOUS ONCE
Status: COMPLETED | OUTPATIENT
Start: 2021-01-01 | End: 2021-01-01

## 2021-01-01 RX ORDER — DEXTROSE MONOHYDRATE 25 G/50ML
25 INJECTION, SOLUTION INTRAVENOUS
Status: DISCONTINUED | OUTPATIENT
Start: 2021-01-01 | End: 2021-01-01 | Stop reason: HOSPADM

## 2021-01-01 RX ORDER — IBUPROFEN 800 MG/1
800 TABLET ORAL EVERY 8 HOURS PRN
Qty: 30 TABLET | Refills: 0 | Status: SHIPPED | OUTPATIENT
Start: 2021-01-01 | End: 2021-01-01 | Stop reason: SDUPTHER

## 2021-01-01 RX ORDER — OXYCODONE HYDROCHLORIDE 10 MG/1
TABLET ORAL
Qty: 90 TABLET | Refills: 0 | Status: SHIPPED | OUTPATIENT
Start: 2021-01-01 | End: 2021-01-01 | Stop reason: SDUPTHER

## 2021-01-01 RX ORDER — OXYCODONE HYDROCHLORIDE 5 MG/1
5 TABLET ORAL EVERY 4 HOURS PRN
Qty: 90 TABLET | Refills: 0 | Status: SHIPPED | OUTPATIENT
Start: 2021-01-01 | End: 2021-01-01

## 2021-01-01 RX ORDER — IPRATROPIUM BROMIDE AND ALBUTEROL SULFATE 2.5; .5 MG/3ML; MG/3ML
3 SOLUTION RESPIRATORY (INHALATION) ONCE AS NEEDED
Status: DISCONTINUED | OUTPATIENT
Start: 2021-01-01 | End: 2021-01-01 | Stop reason: HOSPADM

## 2021-01-01 RX ORDER — FAMOTIDINE 10 MG/ML
20 INJECTION, SOLUTION INTRAVENOUS AS NEEDED
Status: CANCELLED | OUTPATIENT
Start: 2021-01-01

## 2021-01-01 RX ORDER — BUSPIRONE HYDROCHLORIDE 10 MG/1
10 TABLET ORAL 3 TIMES DAILY
Status: DISCONTINUED | OUTPATIENT
Start: 2021-01-01 | End: 2021-01-01

## 2021-01-01 RX ORDER — SODIUM CHLORIDE 0.9 % (FLUSH) 0.9 %
10 SYRINGE (ML) INJECTION EVERY 12 HOURS SCHEDULED
Status: DISCONTINUED | OUTPATIENT
Start: 2021-01-01 | End: 2021-01-01 | Stop reason: HOSPADM

## 2021-01-01 RX ORDER — ONDANSETRON 4 MG/1
4 TABLET, FILM COATED ORAL EVERY 6 HOURS PRN
Status: DISCONTINUED | OUTPATIENT
Start: 2021-01-01 | End: 2021-01-01 | Stop reason: HOSPADM

## 2021-01-01 RX ORDER — HYDROCODONE BITARTRATE AND ACETAMINOPHEN 5; 325 MG/1; MG/1
1 TABLET ORAL ONCE AS NEEDED
Status: DISCONTINUED | OUTPATIENT
Start: 2021-01-01 | End: 2021-01-01 | Stop reason: HOSPADM

## 2021-01-01 RX ORDER — MAGNESIUM SULFATE 1 G/100ML
1 INJECTION INTRAVENOUS ONCE
Status: COMPLETED | OUTPATIENT
Start: 2021-01-01 | End: 2021-01-01

## 2021-01-01 RX ORDER — DOCUSATE SODIUM 100 MG/1
100 CAPSULE, LIQUID FILLED ORAL 2 TIMES DAILY
Qty: 60 CAPSULE | Refills: 3 | Status: SHIPPED | OUTPATIENT
Start: 2021-01-01 | End: 2021-01-01

## 2021-01-01 RX ORDER — MEGESTROL ACETATE 40 MG/1
80 TABLET ORAL 2 TIMES DAILY
Status: DISCONTINUED | OUTPATIENT
Start: 2021-01-01 | End: 2021-01-01 | Stop reason: HOSPADM

## 2021-01-01 RX ORDER — OXYCODONE HYDROCHLORIDE 5 MG/1
TABLET ORAL
Qty: 60 TABLET | Refills: 0 | Status: SHIPPED | OUTPATIENT
Start: 2021-01-01 | End: 2021-01-01

## 2021-01-01 RX ORDER — NICOTINE POLACRILEX 4 MG
15 LOZENGE BUCCAL
Status: DISCONTINUED | OUTPATIENT
Start: 2021-01-01 | End: 2021-01-01

## 2021-01-01 RX ORDER — METFORMIN HYDROCHLORIDE 500 MG/1
2000 TABLET, EXTENDED RELEASE ORAL NIGHTLY
COMMUNITY

## 2021-01-01 RX ORDER — SODIUM CHLORIDE 9 MG/ML
250 INJECTION, SOLUTION INTRAVENOUS ONCE
Status: CANCELLED | OUTPATIENT
Start: 2021-01-01

## 2021-01-01 RX ORDER — PALONOSETRON 0.05 MG/ML
0.25 INJECTION, SOLUTION INTRAVENOUS ONCE
Status: COMPLETED | OUTPATIENT
Start: 2021-01-01 | End: 2021-01-01

## 2021-01-01 RX ORDER — MORPHINE SULFATE 30 MG/1
30 TABLET, FILM COATED, EXTENDED RELEASE ORAL 2 TIMES DAILY
Qty: 60 TABLET | Refills: 0 | Status: SHIPPED | OUTPATIENT
Start: 2021-01-01 | End: 2021-01-01 | Stop reason: SDUPTHER

## 2021-01-01 RX ORDER — FENTANYL CITRATE 50 UG/ML
50 INJECTION, SOLUTION INTRAMUSCULAR; INTRAVENOUS
Status: DISCONTINUED | OUTPATIENT
Start: 2021-01-01 | End: 2021-01-01 | Stop reason: HOSPADM

## 2021-01-01 RX ORDER — DOCUSATE SODIUM 100 MG/1
200 CAPSULE, LIQUID FILLED ORAL DAILY
Status: DISCONTINUED | OUTPATIENT
Start: 2021-01-01 | End: 2021-01-01 | Stop reason: HOSPADM

## 2021-01-01 RX ORDER — ONDANSETRON HYDROCHLORIDE 8 MG/1
8 TABLET, FILM COATED ORAL 3 TIMES DAILY PRN
Qty: 30 TABLET | Refills: 5 | Status: CANCELLED | OUTPATIENT
Start: 2021-01-01

## 2021-01-01 RX ORDER — BUSPIRONE HYDROCHLORIDE 5 MG/1
5 TABLET ORAL 3 TIMES DAILY
Status: DISCONTINUED | OUTPATIENT
Start: 2021-01-01 | End: 2021-01-01 | Stop reason: HOSPADM

## 2021-01-01 RX ORDER — ROSUVASTATIN CALCIUM 40 MG/1
40 TABLET, COATED ORAL EVERY EVENING
COMMUNITY

## 2021-01-01 RX ORDER — ACETAMINOPHEN 325 MG/1
975 TABLET ORAL ONCE
Status: COMPLETED | OUTPATIENT
Start: 2021-01-01 | End: 2021-01-01

## 2021-01-01 RX ORDER — OXYCODONE HYDROCHLORIDE 5 MG/1
5 TABLET ORAL EVERY 4 HOURS PRN
Qty: 90 TABLET | Refills: 0 | Status: SHIPPED | OUTPATIENT
Start: 2021-01-01 | End: 2021-01-01 | Stop reason: SDUPTHER

## 2021-01-01 RX ORDER — GLYCOPYRROLATE 0.2 MG/ML
INJECTION INTRAMUSCULAR; INTRAVENOUS AS NEEDED
Status: DISCONTINUED | OUTPATIENT
Start: 2021-01-01 | End: 2021-01-01 | Stop reason: SURG

## 2021-01-01 RX ORDER — LEVETIRACETAM 1000 MG/1
1000 TABLET ORAL 2 TIMES DAILY
Qty: 60 TABLET | Refills: 0 | Status: SHIPPED | OUTPATIENT
Start: 2021-01-01 | End: 2021-09-22

## 2021-01-01 RX ORDER — POTASSIUM CHLORIDE 750 MG/1
10 TABLET, FILM COATED, EXTENDED RELEASE ORAL 2 TIMES DAILY
Qty: 6 TABLET | Refills: 0 | Status: SHIPPED | OUTPATIENT
Start: 2021-01-01 | End: 2021-01-01

## 2021-01-01 RX ORDER — FENTANYL CITRATE 50 UG/ML
INJECTION, SOLUTION INTRAMUSCULAR; INTRAVENOUS AS NEEDED
Status: DISCONTINUED | OUTPATIENT
Start: 2021-01-01 | End: 2021-01-01 | Stop reason: SURG

## 2021-01-01 RX ORDER — SODIUM CHLORIDE 9 MG/ML
75 INJECTION, SOLUTION INTRAVENOUS CONTINUOUS
Status: DISCONTINUED | OUTPATIENT
Start: 2021-01-01 | End: 2021-01-01 | Stop reason: HOSPADM

## 2021-01-01 RX ORDER — DIPHENHYDRAMINE HYDROCHLORIDE 50 MG/ML
50 INJECTION INTRAMUSCULAR; INTRAVENOUS AS NEEDED
Status: CANCELLED | OUTPATIENT
Start: 2021-01-01

## 2021-01-01 RX ORDER — NITROFURANTOIN 25; 75 MG/1; MG/1
100 CAPSULE ORAL 2 TIMES DAILY
Qty: 10 CAPSULE | Refills: 0 | Status: SHIPPED | OUTPATIENT
Start: 2021-01-01 | End: 2021-01-01

## 2021-01-01 RX ORDER — MORPHINE SULFATE 30 MG/1
30 TABLET, FILM COATED, EXTENDED RELEASE ORAL 2 TIMES DAILY
Qty: 60 TABLET | Refills: 0 | Status: SHIPPED | OUTPATIENT
Start: 2021-01-01 | End: 2021-09-08

## 2021-01-01 RX ORDER — ONDANSETRON HYDROCHLORIDE 8 MG/1
8 TABLET, FILM COATED ORAL 3 TIMES DAILY PRN
Qty: 30 TABLET | Refills: 5 | Status: SHIPPED | OUTPATIENT
Start: 2021-01-01

## 2021-01-01 RX ORDER — BUSPIRONE HYDROCHLORIDE 10 MG/1
10 TABLET ORAL 3 TIMES DAILY
COMMUNITY

## 2021-01-01 RX ORDER — MAGNESIUM SULFATE HEPTAHYDRATE 40 MG/ML
4 INJECTION, SOLUTION INTRAVENOUS AS NEEDED
Status: DISCONTINUED | OUTPATIENT
Start: 2021-01-01 | End: 2021-01-01 | Stop reason: HOSPADM

## 2021-01-01 RX ORDER — DEXTROSE MONOHYDRATE 25 G/50ML
25 INJECTION, SOLUTION INTRAVENOUS
Status: DISCONTINUED | OUTPATIENT
Start: 2021-01-01 | End: 2021-01-01

## 2021-01-01 RX ORDER — PROCHLORPERAZINE MALEATE 10 MG
10 TABLET ORAL EVERY 4 HOURS PRN
Qty: 30 TABLET | Refills: 5 | Status: SHIPPED | OUTPATIENT
Start: 2021-01-01

## 2021-01-01 RX ORDER — PAROXETINE HYDROCHLORIDE 20 MG/1
20 TABLET, FILM COATED ORAL DAILY
Status: DISCONTINUED | OUTPATIENT
Start: 2021-01-01 | End: 2021-01-01 | Stop reason: HOSPADM

## 2021-01-01 RX ORDER — GABAPENTIN 300 MG/1
600 CAPSULE ORAL 3 TIMES DAILY
Status: DISCONTINUED | OUTPATIENT
Start: 2021-01-01 | End: 2021-01-01

## 2021-01-01 RX ORDER — DOCUSATE SODIUM 100 MG/1
100 CAPSULE, LIQUID FILLED ORAL 2 TIMES DAILY
Qty: 60 CAPSULE | Refills: 0 | Status: SHIPPED | OUTPATIENT
Start: 2021-01-01

## 2021-01-01 RX ORDER — SODIUM CHLORIDE 9 MG/ML
1000 INJECTION, SOLUTION INTRAVENOUS CONTINUOUS
Status: DISCONTINUED | OUTPATIENT
Start: 2021-01-01 | End: 2021-01-01 | Stop reason: HOSPADM

## 2021-01-01 RX ORDER — FAMOTIDINE 10 MG/ML
20 INJECTION, SOLUTION INTRAVENOUS ONCE
Status: CANCELLED | OUTPATIENT
Start: 2021-01-01

## 2021-01-01 RX ORDER — LORATADINE 10 MG/1
TABLET ORAL
Qty: 30 TABLET | Refills: 0 | Status: SHIPPED | OUTPATIENT
Start: 2021-01-01

## 2021-01-01 RX ORDER — HYDRALAZINE HYDROCHLORIDE 20 MG/ML
5 INJECTION INTRAMUSCULAR; INTRAVENOUS
Status: DISCONTINUED | OUTPATIENT
Start: 2021-01-01 | End: 2021-01-01 | Stop reason: HOSPADM

## 2021-01-01 RX ORDER — HEPARIN SODIUM 5000 [USP'U]/ML
5000 INJECTION, SOLUTION INTRAVENOUS; SUBCUTANEOUS ONCE
Status: CANCELLED | OUTPATIENT
Start: 2021-01-01 | End: 2021-01-01

## 2021-01-01 RX ORDER — OXYCODONE HYDROCHLORIDE 5 MG/1
10 TABLET ORAL EVERY 6 HOURS PRN
Status: DISCONTINUED | OUTPATIENT
Start: 2021-01-01 | End: 2021-01-01 | Stop reason: HOSPADM

## 2021-01-01 RX ORDER — ACETAMINOPHEN 325 MG/1
975 TABLET ORAL ONCE
Status: CANCELLED | OUTPATIENT
Start: 2021-01-01 | End: 2021-01-01

## 2021-01-01 RX ORDER — SODIUM CHLORIDE 0.9 % (FLUSH) 0.9 %
10 SYRINGE (ML) INJECTION AS NEEDED
Status: DISCONTINUED | OUTPATIENT
Start: 2021-01-01 | End: 2021-01-01 | Stop reason: HOSPADM

## 2021-01-01 RX ORDER — PAROXETINE HYDROCHLORIDE 20 MG/1
40 TABLET, FILM COATED ORAL DAILY
Status: DISCONTINUED | OUTPATIENT
Start: 2021-01-01 | End: 2021-01-01

## 2021-01-01 RX ORDER — OXYCODONE HYDROCHLORIDE 5 MG/1
5 TABLET ORAL EVERY 4 HOURS PRN
Qty: 10 TABLET | Refills: 0 | Status: SHIPPED | OUTPATIENT
Start: 2021-01-01 | End: 2021-01-01 | Stop reason: SDUPTHER

## 2021-01-01 RX ORDER — DIPHENOXYLATE HYDROCHLORIDE AND ATROPINE SULFATE 2.5; .025 MG/1; MG/1
1 TABLET ORAL DAILY
COMMUNITY

## 2021-01-01 RX ORDER — MIRTAZAPINE 15 MG/1
15 TABLET, FILM COATED ORAL NIGHTLY
Qty: 30 TABLET | Refills: 5 | Status: SHIPPED | OUTPATIENT
Start: 2021-01-01

## 2021-01-01 RX ORDER — NALOXONE HCL 0.4 MG/ML
0.4 VIAL (ML) INJECTION AS NEEDED
Status: DISCONTINUED | OUTPATIENT
Start: 2021-01-01 | End: 2021-01-01 | Stop reason: HOSPADM

## 2021-01-01 RX ORDER — DAPAGLIFLOZIN 10 MG/1
10 TABLET, FILM COATED ORAL DAILY
COMMUNITY

## 2021-01-01 RX ORDER — ESMOLOL HYDROCHLORIDE 10 MG/ML
INJECTION INTRAVENOUS AS NEEDED
Status: DISCONTINUED | OUTPATIENT
Start: 2021-01-01 | End: 2021-01-01 | Stop reason: SURG

## 2021-01-01 RX ORDER — PROMETHAZINE HYDROCHLORIDE 25 MG/1
25 TABLET ORAL ONCE AS NEEDED
Status: DISCONTINUED | OUTPATIENT
Start: 2021-01-01 | End: 2021-01-01 | Stop reason: HOSPADM

## 2021-01-01 RX ORDER — GARLIC 180 MG
2 TABLET, DELAYED RELEASE (ENTERIC COATED) ORAL DAILY
COMMUNITY

## 2021-01-01 RX ORDER — SENNA PLUS 8.6 MG/1
2 TABLET ORAL NIGHTLY
Status: DISCONTINUED | OUTPATIENT
Start: 2021-01-01 | End: 2021-01-01 | Stop reason: HOSPADM

## 2021-01-01 RX ORDER — PALONOSETRON 0.05 MG/ML
0.25 INJECTION, SOLUTION INTRAVENOUS ONCE
Status: CANCELLED | OUTPATIENT
Start: 2021-01-01

## 2021-01-01 RX ORDER — MEGESTROL ACETATE 40 MG/1
80 TABLET ORAL 2 TIMES DAILY
Qty: 120 TABLET | Refills: 1 | Status: SHIPPED | OUTPATIENT
Start: 2021-01-01

## 2021-01-01 RX ORDER — OMEPRAZOLE 20 MG/1
20 CAPSULE, DELAYED RELEASE ORAL DAILY
COMMUNITY

## 2021-01-01 RX ORDER — OXYCODONE HYDROCHLORIDE 10 MG/1
10-20 TABLET ORAL EVERY 6 HOURS PRN
Qty: 90 TABLET | Refills: 0 | Status: SHIPPED | OUTPATIENT
Start: 2021-01-01

## 2021-01-01 RX ORDER — ROCURONIUM BROMIDE 10 MG/ML
INJECTION, SOLUTION INTRAVENOUS AS NEEDED
Status: DISCONTINUED | OUTPATIENT
Start: 2021-01-01 | End: 2021-01-01 | Stop reason: SURG

## 2021-01-01 RX ORDER — SODIUM CHLORIDE 9 MG/ML
INJECTION, SOLUTION INTRAVENOUS AS NEEDED
Status: DISCONTINUED | OUTPATIENT
Start: 2021-01-01 | End: 2021-01-01 | Stop reason: HOSPADM

## 2021-01-01 RX ORDER — PROMETHAZINE HYDROCHLORIDE 25 MG/1
25 SUPPOSITORY RECTAL ONCE AS NEEDED
Status: DISCONTINUED | OUTPATIENT
Start: 2021-01-01 | End: 2021-01-01 | Stop reason: HOSPADM

## 2021-01-01 RX ORDER — BUPIVACAINE HYDROCHLORIDE AND EPINEPHRINE 5; 5 MG/ML; UG/ML
INJECTION, SOLUTION PERINEURAL AS NEEDED
Status: DISCONTINUED | OUTPATIENT
Start: 2021-01-01 | End: 2021-01-01 | Stop reason: HOSPADM

## 2021-01-01 RX ORDER — HEPARIN SODIUM 5000 [USP'U]/ML
5000 INJECTION, SOLUTION INTRAVENOUS; SUBCUTANEOUS ONCE
Status: COMPLETED | OUTPATIENT
Start: 2021-01-01 | End: 2021-01-01

## 2021-01-01 RX ORDER — POLYETHYLENE GLYCOL 3350 17 G/17G
17 POWDER, FOR SOLUTION ORAL DAILY
Qty: 250 G | Refills: 1 | Status: SHIPPED | OUTPATIENT
Start: 2021-01-01

## 2021-01-01 RX ORDER — OXYCODONE HYDROCHLORIDE 5 MG/1
10 TABLET ORAL EVERY 4 HOURS PRN
Status: DISCONTINUED | OUTPATIENT
Start: 2021-01-01 | End: 2021-01-01

## 2021-01-01 RX ORDER — PROPOFOL 10 MG/ML
VIAL (ML) INTRAVENOUS AS NEEDED
Status: DISCONTINUED | OUTPATIENT
Start: 2021-01-01 | End: 2021-01-01 | Stop reason: SURG

## 2021-01-01 RX ORDER — SODIUM CHLORIDE 9 MG/ML
250 INJECTION, SOLUTION INTRAVENOUS ONCE
Status: COMPLETED | OUTPATIENT
Start: 2021-01-01 | End: 2021-01-01

## 2021-01-01 RX ORDER — MAGNESIUM SULFATE 1 G/100ML
1 INJECTION INTRAVENOUS AS NEEDED
Status: DISCONTINUED | OUTPATIENT
Start: 2021-01-01 | End: 2021-01-01 | Stop reason: HOSPADM

## 2021-01-01 RX ORDER — OXYCODONE AND ACETAMINOPHEN 10; 325 MG/1; MG/1
1-2 TABLET ORAL EVERY 6 HOURS PRN
Qty: 90 TABLET | Refills: 0 | Status: ON HOLD | OUTPATIENT
Start: 2021-01-01 | End: 2021-01-01 | Stop reason: SDUPTHER

## 2021-01-01 RX ORDER — ONDANSETRON 4 MG/1
4 TABLET, FILM COATED ORAL ONCE AS NEEDED
Status: DISCONTINUED | OUTPATIENT
Start: 2021-01-01 | End: 2021-01-01 | Stop reason: HOSPADM

## 2021-01-01 RX ORDER — TRAMADOL HYDROCHLORIDE 50 MG/1
50 TABLET ORAL EVERY 6 HOURS PRN
Qty: 30 TABLET | Refills: 0 | Status: SHIPPED | OUTPATIENT
Start: 2021-01-01 | End: 2021-01-01

## 2021-01-01 RX ORDER — MIDAZOLAM HYDROCHLORIDE 1 MG/ML
1 INJECTION INTRAMUSCULAR; INTRAVENOUS
Status: DISCONTINUED | OUTPATIENT
Start: 2021-01-01 | End: 2021-01-01 | Stop reason: HOSPADM

## 2021-01-01 RX ORDER — ACETAMINOPHEN 325 MG/1
650 TABLET ORAL ONCE
Status: DISCONTINUED | OUTPATIENT
Start: 2021-01-01 | End: 2021-01-01 | Stop reason: HOSPADM

## 2021-01-01 RX ORDER — DEXAMETHASONE 4 MG/1
4 TABLET ORAL 2 TIMES DAILY WITH MEALS
Qty: 30 TABLET | Refills: 0 | Status: SHIPPED | OUTPATIENT
Start: 2021-01-01 | End: 2021-09-07

## 2021-01-01 RX ORDER — NEOSTIGMINE METHYLSULFATE 1 MG/ML
INJECTION, SOLUTION INTRAVENOUS AS NEEDED
Status: DISCONTINUED | OUTPATIENT
Start: 2021-01-01 | End: 2021-01-01 | Stop reason: SURG

## 2021-01-01 RX ORDER — FAMOTIDINE 20 MG/1
20 TABLET, FILM COATED ORAL
Status: COMPLETED | OUTPATIENT
Start: 2021-01-01 | End: 2021-01-01

## 2021-01-01 RX ORDER — PAROXETINE HYDROCHLORIDE 40 MG/1
40 TABLET, FILM COATED ORAL DAILY
COMMUNITY
Start: 2021-01-01

## 2021-01-01 RX ORDER — TRAMADOL HYDROCHLORIDE 50 MG/1
50 TABLET ORAL EVERY 6 HOURS PRN
Qty: 10 TABLET | Refills: 0 | Status: SHIPPED | OUTPATIENT
Start: 2021-01-01 | End: 2021-01-01 | Stop reason: SDUPTHER

## 2021-01-01 RX ORDER — LEVETIRACETAM 10 MG/ML
1000 INJECTION INTRAVASCULAR EVERY 12 HOURS SCHEDULED
Status: DISCONTINUED | OUTPATIENT
Start: 2021-01-01 | End: 2021-01-01 | Stop reason: HOSPADM

## 2021-01-01 RX ORDER — TRAMADOL HYDROCHLORIDE 50 MG/1
50-100 TABLET ORAL EVERY 6 HOURS PRN
Qty: 60 TABLET | Refills: 0 | Status: SHIPPED | OUTPATIENT
Start: 2021-01-01 | End: 2021-01-01

## 2021-01-01 RX ORDER — HYDROCODONE BITARTRATE AND ACETAMINOPHEN 5; 325 MG/1; MG/1
1 TABLET ORAL EVERY 6 HOURS PRN
Qty: 30 TABLET | Refills: 0 | Status: SHIPPED | OUTPATIENT
Start: 2021-01-01 | End: 2021-01-01

## 2021-01-01 RX ORDER — LISINOPRIL 20 MG/1
20 TABLET ORAL DAILY
COMMUNITY
Start: 2021-01-01

## 2021-01-01 RX ORDER — MORPHINE SULFATE 30 MG/1
30 TABLET, FILM COATED, EXTENDED RELEASE ORAL 2 TIMES DAILY
Status: DISCONTINUED | OUTPATIENT
Start: 2021-01-01 | End: 2021-01-01 | Stop reason: HOSPADM

## 2021-01-01 RX ORDER — ONDANSETRON 2 MG/ML
4 INJECTION INTRAMUSCULAR; INTRAVENOUS ONCE AS NEEDED
Status: DISCONTINUED | OUTPATIENT
Start: 2021-01-01 | End: 2021-01-01 | Stop reason: HOSPADM

## 2021-01-01 RX ORDER — POTASSIUM CHLORIDE 750 MG/1
40 CAPSULE, EXTENDED RELEASE ORAL ONCE
Status: DISCONTINUED | OUTPATIENT
Start: 2021-01-01 | End: 2021-01-01

## 2021-01-01 RX ORDER — DEXTROSE MONOHYDRATE 25 G/50ML
25 INJECTION, SOLUTION INTRAVENOUS
Status: DISCONTINUED | OUTPATIENT
Start: 2021-01-01 | End: 2021-01-01 | Stop reason: SDUPTHER

## 2021-01-01 RX ORDER — LEVOTHYROXINE SODIUM 0.12 MG/1
250 TABLET ORAL
Status: DISCONTINUED | OUTPATIENT
Start: 2021-01-01 | End: 2021-01-01 | Stop reason: HOSPADM

## 2021-01-01 RX ORDER — MAGNESIUM SULFATE HEPTAHYDRATE 40 MG/ML
2 INJECTION, SOLUTION INTRAVENOUS AS NEEDED
Status: DISCONTINUED | OUTPATIENT
Start: 2021-01-01 | End: 2021-01-01 | Stop reason: HOSPADM

## 2021-01-01 RX ORDER — FAMOTIDINE 10 MG/ML
20 INJECTION, SOLUTION INTRAVENOUS ONCE
Status: COMPLETED | OUTPATIENT
Start: 2021-01-01 | End: 2021-01-01

## 2021-01-01 RX ORDER — IBUPROFEN 800 MG/1
800 TABLET ORAL EVERY 8 HOURS PRN
Qty: 90 TABLET | Refills: 3 | Status: SHIPPED | OUTPATIENT
Start: 2021-01-01

## 2021-01-01 RX ORDER — CEFAZOLIN SODIUM IN 0.9 % NACL 3 G/100 ML
3 INTRAVENOUS SOLUTION, PIGGYBACK (ML) INTRAVENOUS ONCE
Status: COMPLETED | OUTPATIENT
Start: 2021-01-01 | End: 2021-01-01

## 2021-01-01 RX ORDER — DEXAMETHASONE SODIUM PHOSPHATE 4 MG/ML
4 INJECTION, SOLUTION INTRA-ARTICULAR; INTRALESIONAL; INTRAMUSCULAR; INTRAVENOUS; SOFT TISSUE EVERY 6 HOURS
Status: DISCONTINUED | OUTPATIENT
Start: 2021-01-01 | End: 2021-01-01 | Stop reason: HOSPADM

## 2021-01-01 RX ORDER — SODIUM CHLORIDE 9 MG/ML
250 INJECTION, SOLUTION INTRAVENOUS AS NEEDED
Status: DISCONTINUED | OUTPATIENT
Start: 2021-01-01 | End: 2021-01-01 | Stop reason: HOSPADM

## 2021-01-01 RX ORDER — INSULIN HUMAN 500 [IU]/ML
INJECTION, SOLUTION SUBCUTANEOUS
COMMUNITY

## 2021-01-01 RX ORDER — ONDANSETRON 2 MG/ML
4 INJECTION INTRAMUSCULAR; INTRAVENOUS EVERY 6 HOURS PRN
Status: DISCONTINUED | OUTPATIENT
Start: 2021-01-01 | End: 2021-01-01 | Stop reason: HOSPADM

## 2021-01-01 RX ORDER — ONDANSETRON 2 MG/ML
4 INJECTION INTRAMUSCULAR; INTRAVENOUS ONCE
Status: COMPLETED | OUTPATIENT
Start: 2021-01-01 | End: 2021-01-01

## 2021-01-01 RX ORDER — DEXAMETHASONE 4 MG/1
TABLET ORAL
Qty: 11 TABLET | Refills: 5 | Status: SHIPPED | OUTPATIENT
Start: 2021-01-01 | End: 2021-01-01 | Stop reason: HOSPADM

## 2021-01-01 RX ORDER — POLYETHYLENE GLYCOL 3350 17 G/17G
17 POWDER, FOR SOLUTION ORAL DAILY
Status: DISCONTINUED | OUTPATIENT
Start: 2021-01-01 | End: 2021-01-01 | Stop reason: HOSPADM

## 2021-01-01 RX ORDER — PROMETHAZINE HYDROCHLORIDE 12.5 MG/1
12.5 TABLET ORAL ONCE AS NEEDED
Status: DISCONTINUED | OUTPATIENT
Start: 2021-01-01 | End: 2021-01-01 | Stop reason: HOSPADM

## 2021-01-01 RX ORDER — IBUPROFEN 600 MG/1
600 TABLET ORAL EVERY 6 HOURS PRN
Status: CANCELLED | OUTPATIENT
Start: 2021-01-01

## 2021-01-01 RX ORDER — OXYCODONE HYDROCHLORIDE 5 MG/1
5 TABLET ORAL EVERY 4 HOURS PRN
Qty: 15 TABLET | Refills: 0 | Status: SHIPPED | OUTPATIENT
Start: 2021-01-01 | End: 2021-01-01 | Stop reason: SDUPTHER

## 2021-01-01 RX ORDER — POTASSIUM CHLORIDE 750 MG/1
20 CAPSULE, EXTENDED RELEASE ORAL ONCE
Status: DISCONTINUED | OUTPATIENT
Start: 2021-01-01 | End: 2021-01-01

## 2021-01-01 RX ORDER — NICOTINE POLACRILEX 4 MG
15 LOZENGE BUCCAL
Status: DISCONTINUED | OUTPATIENT
Start: 2021-01-01 | End: 2021-01-01 | Stop reason: SDUPTHER

## 2021-01-01 RX ORDER — SODIUM CHLORIDE 9 MG/ML
1000 INJECTION, SOLUTION INTRAVENOUS CONTINUOUS
Status: CANCELLED
Start: 2021-01-01

## 2021-01-01 RX ORDER — CASTOR OIL AND BALSAM, PERU 788; 87 MG/G; MG/G
OINTMENT TOPICAL EVERY 12 HOURS SCHEDULED
Status: DISCONTINUED | OUTPATIENT
Start: 2021-01-01 | End: 2021-01-01 | Stop reason: HOSPADM

## 2021-01-01 RX ORDER — GLIMEPIRIDE 2 MG/1
2 TABLET ORAL 2 TIMES DAILY
COMMUNITY

## 2021-01-01 RX ORDER — GABAPENTIN 300 MG/1
600 CAPSULE ORAL 3 TIMES DAILY
Qty: 180 CAPSULE | Refills: 0 | Status: SHIPPED | OUTPATIENT
Start: 2021-01-01 | End: 2021-01-01

## 2021-01-01 RX ORDER — NICOTINE POLACRILEX 4 MG
15 LOZENGE BUCCAL
Status: DISCONTINUED | OUTPATIENT
Start: 2021-01-01 | End: 2021-01-01 | Stop reason: HOSPADM

## 2021-01-01 RX ORDER — OXYCODONE AND ACETAMINOPHEN 10; 325 MG/1; MG/1
1 TABLET ORAL EVERY 6 HOURS PRN
Qty: 12 TABLET | Refills: 0 | Status: SHIPPED | OUTPATIENT
Start: 2021-01-01 | End: 2021-01-01

## 2021-01-01 RX ORDER — DEXAMETHASONE SODIUM PHOSPHATE 4 MG/ML
INJECTION, SOLUTION INTRA-ARTICULAR; INTRALESIONAL; INTRAMUSCULAR; INTRAVENOUS; SOFT TISSUE AS NEEDED
Status: DISCONTINUED | OUTPATIENT
Start: 2021-01-01 | End: 2021-01-01 | Stop reason: SURG

## 2021-01-01 RX ORDER — SODIUM CHLORIDE 0.9 % (FLUSH) 0.9 %
3 SYRINGE (ML) INJECTION EVERY 12 HOURS SCHEDULED
Status: DISCONTINUED | OUTPATIENT
Start: 2021-01-01 | End: 2021-01-01 | Stop reason: HOSPADM

## 2021-01-01 RX ORDER — PANTOPRAZOLE SODIUM 40 MG/1
40 TABLET, DELAYED RELEASE ORAL
Status: DISCONTINUED | OUTPATIENT
Start: 2021-01-01 | End: 2021-01-01 | Stop reason: HOSPADM

## 2021-01-01 RX ORDER — DROPERIDOL 2.5 MG/ML
0.62 INJECTION, SOLUTION INTRAMUSCULAR; INTRAVENOUS AS NEEDED
Status: DISCONTINUED | OUTPATIENT
Start: 2021-01-01 | End: 2021-01-01 | Stop reason: HOSPADM

## 2021-01-01 RX ORDER — MORPHINE SULFATE 2 MG/ML
2 INJECTION, SOLUTION INTRAMUSCULAR; INTRAVENOUS EVERY 4 HOURS PRN
Status: DISCONTINUED | OUTPATIENT
Start: 2021-01-01 | End: 2021-01-01 | Stop reason: HOSPADM

## 2021-01-01 RX ORDER — ONDANSETRON 2 MG/ML
INJECTION INTRAMUSCULAR; INTRAVENOUS AS NEEDED
Status: DISCONTINUED | OUTPATIENT
Start: 2021-01-01 | End: 2021-01-01 | Stop reason: SURG

## 2021-01-01 RX ORDER — MIRTAZAPINE 15 MG/1
15 TABLET, FILM COATED ORAL NIGHTLY
Status: DISCONTINUED | OUTPATIENT
Start: 2021-01-01 | End: 2021-01-01

## 2021-01-01 RX ORDER — LABETALOL HYDROCHLORIDE 5 MG/ML
5 INJECTION, SOLUTION INTRAVENOUS
Status: DISCONTINUED | OUTPATIENT
Start: 2021-01-01 | End: 2021-01-01 | Stop reason: HOSPADM

## 2021-01-01 RX ORDER — LIDOCAINE HYDROCHLORIDE 10 MG/ML
0.5 INJECTION, SOLUTION EPIDURAL; INFILTRATION; INTRACAUDAL; PERINEURAL ONCE AS NEEDED
Status: COMPLETED | OUTPATIENT
Start: 2021-01-01 | End: 2021-01-01

## 2021-01-01 RX ORDER — SENNOSIDES 8.6 MG
1 CAPSULE ORAL DAILY
Qty: 30 EACH | Refills: 3 | Status: SHIPPED | OUTPATIENT
Start: 2021-01-01

## 2021-01-01 RX ORDER — SODIUM CHLORIDE 9 MG/ML
250 INJECTION, SOLUTION INTRAVENOUS AS NEEDED
Status: CANCELLED | OUTPATIENT
Start: 2021-01-01

## 2021-01-01 RX ORDER — POTASSIUM CHLORIDE 1.5 G/1.77G
40 POWDER, FOR SOLUTION ORAL ONCE
Status: COMPLETED | OUTPATIENT
Start: 2021-01-01 | End: 2021-01-01

## 2021-01-01 RX ORDER — LEVOTHYROXINE SODIUM 0.12 MG/1
250 TABLET ORAL TAKE AS DIRECTED
COMMUNITY

## 2021-01-01 RX ORDER — DROPERIDOL 2.5 MG/ML
0.62 INJECTION, SOLUTION INTRAMUSCULAR; INTRAVENOUS ONCE AS NEEDED
Status: DISCONTINUED | OUTPATIENT
Start: 2021-01-01 | End: 2021-01-01 | Stop reason: HOSPADM

## 2021-01-01 RX ORDER — INDOCYANINE GREEN AND WATER 25 MG
KIT INJECTION AS NEEDED
Status: DISCONTINUED | OUTPATIENT
Start: 2021-01-01 | End: 2021-01-01 | Stop reason: HOSPADM

## 2021-01-01 RX ORDER — ACETAMINOPHEN 500 MG
1000 TABLET ORAL ONCE
Status: COMPLETED | OUTPATIENT
Start: 2021-01-01 | End: 2021-01-01

## 2021-01-01 RX ORDER — MEPERIDINE HYDROCHLORIDE 25 MG/ML
12.5 INJECTION INTRAMUSCULAR; INTRAVENOUS; SUBCUTANEOUS
Status: DISCONTINUED | OUTPATIENT
Start: 2021-01-01 | End: 2021-01-01 | Stop reason: HOSPADM

## 2021-01-01 RX ORDER — HYDROMORPHONE HYDROCHLORIDE 1 MG/ML
0.5 INJECTION, SOLUTION INTRAMUSCULAR; INTRAVENOUS; SUBCUTANEOUS
Status: DISCONTINUED | OUTPATIENT
Start: 2021-01-01 | End: 2021-01-01 | Stop reason: HOSPADM

## 2021-01-01 RX ORDER — SODIUM CHLORIDE, SODIUM LACTATE, POTASSIUM CHLORIDE, CALCIUM CHLORIDE 600; 310; 30; 20 MG/100ML; MG/100ML; MG/100ML; MG/100ML
9 INJECTION, SOLUTION INTRAVENOUS CONTINUOUS PRN
Status: DISCONTINUED | OUTPATIENT
Start: 2021-01-01 | End: 2021-01-01 | Stop reason: HOSPADM

## 2021-01-01 RX ORDER — SODIUM CHLORIDE 0.9 % (FLUSH) 0.9 %
3-10 SYRINGE (ML) INJECTION AS NEEDED
Status: DISCONTINUED | OUTPATIENT
Start: 2021-01-01 | End: 2021-01-01 | Stop reason: HOSPADM

## 2021-01-01 RX ORDER — ACETAMINOPHEN 325 MG/1
650 TABLET ORAL EVERY 6 HOURS PRN
Qty: 60 TABLET | Refills: 0 | Status: SHIPPED | OUTPATIENT
Start: 2021-01-01

## 2021-01-01 RX ORDER — FLUCONAZOLE 2 MG/ML
150 INJECTION, SOLUTION INTRAVENOUS ONCE
Status: DISCONTINUED | OUTPATIENT
Start: 2021-01-01 | End: 2021-01-01 | Stop reason: ALTCHOICE

## 2021-01-01 RX ORDER — LIDOCAINE HYDROCHLORIDE 10 MG/ML
INJECTION, SOLUTION EPIDURAL; INFILTRATION; INTRACAUDAL; PERINEURAL AS NEEDED
Status: DISCONTINUED | OUTPATIENT
Start: 2021-01-01 | End: 2021-01-01 | Stop reason: SURG

## 2021-01-01 RX ORDER — MAGNESIUM HYDROXIDE 1200 MG/15ML
LIQUID ORAL AS NEEDED
Status: DISCONTINUED | OUTPATIENT
Start: 2021-01-01 | End: 2021-01-01 | Stop reason: HOSPADM

## 2021-01-01 RX ORDER — GABAPENTIN 300 MG/1
300 CAPSULE ORAL 3 TIMES DAILY
Status: DISCONTINUED | OUTPATIENT
Start: 2021-01-01 | End: 2021-01-01 | Stop reason: HOSPADM

## 2021-01-01 RX ADMIN — INSULIN LISPRO 3 UNITS: 100 INJECTION, SOLUTION INTRAVENOUS; SUBCUTANEOUS at 21:48

## 2021-01-01 RX ADMIN — DEXAMETHASONE SODIUM PHOSPHATE 4 MG: 4 INJECTION, SOLUTION INTRA-ARTICULAR; INTRALESIONAL; INTRAMUSCULAR; INTRAVENOUS; SOFT TISSUE at 23:58

## 2021-01-01 RX ADMIN — MAGNESIUM SULFATE HEPTAHYDRATE 1 G: 1 INJECTION, SOLUTION INTRAVENOUS at 03:41

## 2021-01-01 RX ADMIN — INSULIN LISPRO 8 UNITS: 100 INJECTION, SOLUTION INTRAVENOUS; SUBCUTANEOUS at 18:40

## 2021-01-01 RX ADMIN — SENNOSIDES 2 TABLET: 8.6 TABLET, FILM COATED ORAL at 21:16

## 2021-01-01 RX ADMIN — PAROXETINE HYDROCHLORIDE 20 MG: 20 TABLET, FILM COATED ORAL at 09:27

## 2021-01-01 RX ADMIN — INSULIN LISPRO 3 UNITS: 100 INJECTION, SOLUTION INTRAVENOUS; SUBCUTANEOUS at 06:11

## 2021-01-01 RX ADMIN — BUSPIRONE HYDROCHLORIDE 5 MG: 5 TABLET ORAL at 09:42

## 2021-01-01 RX ADMIN — INSULIN LISPRO 5 UNITS: 100 INJECTION, SOLUTION INTRAVENOUS; SUBCUTANEOUS at 21:14

## 2021-01-01 RX ADMIN — BUSPIRONE HYDROCHLORIDE 5 MG: 5 TABLET ORAL at 09:54

## 2021-01-01 RX ADMIN — CASTOR OIL AND BALSAM, PERU 1 EACH: 788; 87 OINTMENT TOPICAL at 09:57

## 2021-01-01 RX ADMIN — DEXAMETHASONE SODIUM PHOSPHATE 4 MG: 4 INJECTION, SOLUTION INTRA-ARTICULAR; INTRALESIONAL; INTRAMUSCULAR; INTRAVENOUS; SOFT TISSUE at 05:49

## 2021-01-01 RX ADMIN — OXYCODONE 10 MG: 5 TABLET ORAL at 03:55

## 2021-01-01 RX ADMIN — MEGESTROL ACETATE 80 MG: 40 TABLET ORAL at 21:48

## 2021-01-01 RX ADMIN — INSULIN LISPRO 5 UNITS: 100 INJECTION, SOLUTION INTRAVENOUS; SUBCUTANEOUS at 09:12

## 2021-01-01 RX ADMIN — MORPHINE SULFATE 30 MG: 30 TABLET, FILM COATED, EXTENDED RELEASE ORAL at 21:48

## 2021-01-01 RX ADMIN — MEGESTROL ACETATE 80 MG: 40 TABLET ORAL at 09:28

## 2021-01-01 RX ADMIN — ESMOLOL HYDROCHLORIDE 30 MG: 10 INJECTION, SOLUTION INTRAVENOUS at 09:57

## 2021-01-01 RX ADMIN — SODIUM CHLORIDE 1000 ML: 9 INJECTION, SOLUTION INTRAVENOUS at 02:57

## 2021-01-01 RX ADMIN — CASTOR OIL AND BALSAM, PERU: 788; 87 OINTMENT TOPICAL at 22:02

## 2021-01-01 RX ADMIN — BUSPIRONE HYDROCHLORIDE 5 MG: 5 TABLET ORAL at 09:27

## 2021-01-01 RX ADMIN — MEGESTROL ACETATE 80 MG: 40 TABLET ORAL at 21:16

## 2021-01-01 RX ADMIN — LEVETIRACETAM 1000 MG: 10 INJECTION INTRAVENOUS at 05:49

## 2021-01-01 RX ADMIN — INSULIN LISPRO 3 UNITS: 100 INJECTION, SOLUTION INTRAVENOUS; SUBCUTANEOUS at 12:07

## 2021-01-01 RX ADMIN — POLYETHYLENE GLYCOL 3350 17 G: 17 POWDER, FOR SOLUTION ORAL at 09:45

## 2021-01-01 RX ADMIN — SODIUM CHLORIDE 1 G: 900 INJECTION INTRAVENOUS at 21:47

## 2021-01-01 RX ADMIN — NYSTATIN 500000 UNITS: 100000 SUSPENSION ORAL at 12:25

## 2021-01-01 RX ADMIN — PAROXETINE HYDROCHLORIDE 20 MG: 20 TABLET, FILM COATED ORAL at 09:54

## 2021-01-01 RX ADMIN — INSULIN LISPRO 10 UNITS: 100 INJECTION, SOLUTION INTRAVENOUS; SUBCUTANEOUS at 03:29

## 2021-01-01 RX ADMIN — DEXAMETHASONE SODIUM PHOSPHATE 4 MG: 4 INJECTION, SOLUTION INTRA-ARTICULAR; INTRALESIONAL; INTRAMUSCULAR; INTRAVENOUS; SOFT TISSUE at 17:48

## 2021-01-01 RX ADMIN — INSULIN LISPRO 5 UNITS: 100 INJECTION, SOLUTION INTRAVENOUS; SUBCUTANEOUS at 15:24

## 2021-01-01 RX ADMIN — SODIUM CHLORIDE 1 G: 900 INJECTION INTRAVENOUS at 22:39

## 2021-01-01 RX ADMIN — INSULIN LISPRO 5 UNITS: 100 INJECTION, SOLUTION INTRAVENOUS; SUBCUTANEOUS at 18:05

## 2021-01-01 RX ADMIN — PANTOPRAZOLE SODIUM 40 MG: 40 TABLET, DELAYED RELEASE ORAL at 18:03

## 2021-01-01 RX ADMIN — IOPAMIDOL 95 ML: 612 INJECTION, SOLUTION INTRAVENOUS at 07:33

## 2021-01-01 RX ADMIN — FENTANYL CITRATE 25 MCG: 50 INJECTION, SOLUTION INTRAMUSCULAR; INTRAVENOUS at 08:41

## 2021-01-01 RX ADMIN — HYDROCODONE BITARTRATE AND ACETAMINOPHEN 1 TABLET: 5; 325 TABLET ORAL at 16:40

## 2021-01-01 RX ADMIN — SODIUM CHLORIDE 385 MG: 9 INJECTION, SOLUTION INTRAVENOUS at 11:11

## 2021-01-01 RX ADMIN — SODIUM CHLORIDE 1 G: 900 INJECTION INTRAVENOUS at 20:41

## 2021-01-01 RX ADMIN — SODIUM CHLORIDE 1 G: 900 INJECTION INTRAVENOUS at 05:19

## 2021-01-01 RX ADMIN — NYSTATIN 500000 UNITS: 100000 SUSPENSION ORAL at 09:30

## 2021-01-01 RX ADMIN — CASTOR OIL AND BALSAM, PERU: 788; 87 OINTMENT TOPICAL at 09:57

## 2021-01-01 RX ADMIN — INSULIN LISPRO 8 UNITS: 100 INJECTION, SOLUTION INTRAVENOUS; SUBCUTANEOUS at 12:39

## 2021-01-01 RX ADMIN — NYSTATIN 500000 UNITS: 100000 SUSPENSION ORAL at 17:15

## 2021-01-01 RX ADMIN — Medication 2 G: at 11:43

## 2021-01-01 RX ADMIN — HEPARIN SODIUM 5000 UNITS: 5000 INJECTION, SOLUTION INTRAVENOUS; SUBCUTANEOUS at 06:44

## 2021-01-01 RX ADMIN — SODIUM CHLORIDE, PRESERVATIVE FREE 10 ML: 5 INJECTION INTRAVENOUS at 09:46

## 2021-01-01 RX ADMIN — INSULIN LISPRO 15 UNITS: 100 INJECTION, SOLUTION INTRAVENOUS; SUBCUTANEOUS at 14:07

## 2021-01-01 RX ADMIN — SODIUM CHLORIDE 75 ML/HR: 9 INJECTION, SOLUTION INTRAVENOUS at 12:39

## 2021-01-01 RX ADMIN — ROCURONIUM BROMIDE 10 MG: 10 INJECTION, SOLUTION INTRAVENOUS at 09:15

## 2021-01-01 RX ADMIN — SODIUM CHLORIDE, PRESERVATIVE FREE 10 ML: 5 INJECTION INTRAVENOUS at 09:58

## 2021-01-01 RX ADMIN — DOCUSATE SODIUM 200 MG: 100 CAPSULE, LIQUID FILLED ORAL at 09:42

## 2021-01-01 RX ADMIN — LEVETIRACETAM 1000 MG: 10 INJECTION INTRAVENOUS at 06:11

## 2021-01-01 RX ADMIN — CASTOR OIL AND BALSAM, PERU: 788; 87 OINTMENT TOPICAL at 21:12

## 2021-01-01 RX ADMIN — NYSTATIN 500000 UNITS: 100000 SUSPENSION ORAL at 21:12

## 2021-01-01 RX ADMIN — CASTOR OIL AND BALSAM, PERU 1 EACH: 788; 87 OINTMENT TOPICAL at 09:42

## 2021-01-01 RX ADMIN — MEGESTROL ACETATE 80 MG: 40 TABLET ORAL at 09:59

## 2021-01-01 RX ADMIN — LEVETIRACETAM 1000 MG: 10 INJECTION INTRAVENOUS at 05:19

## 2021-01-01 RX ADMIN — FAMOTIDINE 20 MG: 10 INJECTION, SOLUTION INTRAVENOUS at 10:47

## 2021-01-01 RX ADMIN — SODIUM CHLORIDE, PRESERVATIVE FREE 10 ML: 5 INJECTION INTRAVENOUS at 10:07

## 2021-01-01 RX ADMIN — MICAFUNGIN SODIUM 100 MG: 100 INJECTION, POWDER, LYOPHILIZED, FOR SOLUTION INTRAVENOUS at 19:42

## 2021-01-01 RX ADMIN — SODIUM CHLORIDE, POTASSIUM CHLORIDE, SODIUM LACTATE AND CALCIUM CHLORIDE: 600; 310; 30; 20 INJECTION, SOLUTION INTRAVENOUS at 09:46

## 2021-01-01 RX ADMIN — MORPHINE SULFATE 30 MG: 30 TABLET, FILM COATED, EXTENDED RELEASE ORAL at 21:12

## 2021-01-01 RX ADMIN — BARIUM SULFATE 20 ML: 400 PASTE ORAL at 11:28

## 2021-01-01 RX ADMIN — NYSTATIN 500000 UNITS: 100000 SUSPENSION ORAL at 09:59

## 2021-01-01 RX ADMIN — LEVETIRACETAM 1000 MG: 10 INJECTION INTRAVENOUS at 05:40

## 2021-01-01 RX ADMIN — SODIUM CHLORIDE 75 ML/HR: 9 INJECTION, SOLUTION INTRAVENOUS at 19:41

## 2021-01-01 RX ADMIN — NYSTATIN 500000 UNITS: 100000 SUSPENSION ORAL at 12:26

## 2021-01-01 RX ADMIN — PAROXETINE HYDROCHLORIDE 20 MG: 20 TABLET, FILM COATED ORAL at 09:42

## 2021-01-01 RX ADMIN — MORPHINE SULFATE 30 MG: 30 TABLET, FILM COATED, EXTENDED RELEASE ORAL at 20:41

## 2021-01-01 RX ADMIN — BUSPIRONE HYDROCHLORIDE 5 MG: 5 TABLET ORAL at 16:30

## 2021-01-01 RX ADMIN — NYSTATIN 500000 UNITS: 100000 SUSPENSION ORAL at 09:46

## 2021-01-01 RX ADMIN — SODIUM CHLORIDE 1 G: 900 INJECTION INTRAVENOUS at 21:12

## 2021-01-01 RX ADMIN — PAROXETINE HYDROCHLORIDE 40 MG: 20 TABLET, FILM COATED ORAL at 17:55

## 2021-01-01 RX ADMIN — CASTOR OIL AND BALSAM, PERU: 788; 87 OINTMENT TOPICAL at 20:43

## 2021-01-01 RX ADMIN — SODIUM CHLORIDE, PRESERVATIVE FREE 10 ML: 5 INJECTION INTRAVENOUS at 21:19

## 2021-01-01 RX ADMIN — ACETAMINOPHEN 1000 MG: 500 TABLET, FILM COATED ORAL at 02:57

## 2021-01-01 RX ADMIN — INSULIN LISPRO 3 UNITS: 100 INJECTION, SOLUTION INTRAVENOUS; SUBCUTANEOUS at 05:48

## 2021-01-01 RX ADMIN — SODIUM CHLORIDE 150 MG: 9 INJECTION, SOLUTION INTRAVENOUS at 10:21

## 2021-01-01 RX ADMIN — GABAPENTIN 300 MG: 300 CAPSULE ORAL at 21:12

## 2021-01-01 RX ADMIN — PROPOFOL 25 MCG/KG/MIN: 10 INJECTION, EMULSION INTRAVENOUS at 08:01

## 2021-01-01 RX ADMIN — DEXAMETHASONE SODIUM PHOSPHATE 4 MG: 4 INJECTION, SOLUTION INTRA-ARTICULAR; INTRALESIONAL; INTRAMUSCULAR; INTRAVENOUS; SOFT TISSUE at 06:11

## 2021-01-01 RX ADMIN — INSULIN LISPRO 20 UNITS: 100 INJECTION, SOLUTION INTRAVENOUS; SUBCUTANEOUS at 11:38

## 2021-01-01 RX ADMIN — DEXAMETHASONE SODIUM PHOSPHATE 4 MG: 4 INJECTION, SOLUTION INTRA-ARTICULAR; INTRALESIONAL; INTRAMUSCULAR; INTRAVENOUS; SOFT TISSUE at 05:38

## 2021-01-01 RX ADMIN — NYSTATIN 500000 UNITS: 100000 SUSPENSION ORAL at 17:49

## 2021-01-01 RX ADMIN — LEVETIRACETAM 1000 MG: 10 INJECTION INTRAVENOUS at 18:06

## 2021-01-01 RX ADMIN — CASTOR OIL AND BALSAM, PERU: 788; 87 OINTMENT TOPICAL at 12:26

## 2021-01-01 RX ADMIN — DEXAMETHASONE SODIUM PHOSPHATE 4 MG: 4 INJECTION, SOLUTION INTRA-ARTICULAR; INTRALESIONAL; INTRAMUSCULAR; INTRAVENOUS; SOFT TISSUE at 18:31

## 2021-01-01 RX ADMIN — PANTOPRAZOLE SODIUM 40 MG: 40 TABLET, DELAYED RELEASE ORAL at 09:41

## 2021-01-01 RX ADMIN — CARBOPLATIN 900 MG: 10 INJECTION, SOLUTION INTRAVENOUS at 14:14

## 2021-01-01 RX ADMIN — INSULIN LISPRO 5 UNITS: 100 INJECTION, SOLUTION INTRAVENOUS; SUBCUTANEOUS at 20:48

## 2021-01-01 RX ADMIN — NEOSTIGMINE 5 MG: 1 INJECTION INTRAVENOUS at 13:02

## 2021-01-01 RX ADMIN — CASTOR OIL AND BALSAM, PERU: 788; 87 OINTMENT TOPICAL at 09:29

## 2021-01-01 RX ADMIN — INSULIN LISPRO 3 UNITS: 100 INJECTION, SOLUTION INTRAVENOUS; SUBCUTANEOUS at 09:37

## 2021-01-01 RX ADMIN — INSULIN LISPRO 8 UNITS: 100 INJECTION, SOLUTION INTRAVENOUS; SUBCUTANEOUS at 17:41

## 2021-01-01 RX ADMIN — INSULIN LISPRO 3 UNITS: 100 INJECTION, SOLUTION INTRAVENOUS; SUBCUTANEOUS at 18:04

## 2021-01-01 RX ADMIN — NYSTATIN 500000 UNITS: 100000 SUSPENSION ORAL at 09:55

## 2021-01-01 RX ADMIN — INSULIN LISPRO 2 UNITS: 100 INJECTION, SOLUTION INTRAVENOUS; SUBCUTANEOUS at 09:42

## 2021-01-01 RX ADMIN — INSULIN LISPRO 5 UNITS: 100 INJECTION, SOLUTION INTRAVENOUS; SUBCUTANEOUS at 12:21

## 2021-01-01 RX ADMIN — SODIUM CHLORIDE 75 ML/HR: 9 INJECTION, SOLUTION INTRAVENOUS at 16:49

## 2021-01-01 RX ADMIN — ROCURONIUM BROMIDE 50 MG: 10 INJECTION, SOLUTION INTRAVENOUS at 07:39

## 2021-01-01 RX ADMIN — FENTANYL CITRATE 50 MCG: 50 INJECTION, SOLUTION INTRAMUSCULAR; INTRAVENOUS at 09:19

## 2021-01-01 RX ADMIN — LIDOCAINE HYDROCHLORIDE 0.2 ML: 10 INJECTION, SOLUTION EPIDURAL; INFILTRATION; INTRACAUDAL; PERINEURAL at 06:35

## 2021-01-01 RX ADMIN — NYSTATIN 500000 UNITS: 100000 SUSPENSION ORAL at 12:07

## 2021-01-01 RX ADMIN — NYSTATIN 500000 UNITS: 100000 SUSPENSION ORAL at 12:49

## 2021-01-01 RX ADMIN — INSULIN LISPRO 3 UNITS: 100 INJECTION, SOLUTION INTRAVENOUS; SUBCUTANEOUS at 03:44

## 2021-01-01 RX ADMIN — INSULIN LISPRO 6 UNITS: 100 INJECTION, SOLUTION INTRAVENOUS; SUBCUTANEOUS at 22:04

## 2021-01-01 RX ADMIN — ONDANSETRON 4 MG: 2 INJECTION INTRAMUSCULAR; INTRAVENOUS at 09:10

## 2021-01-01 RX ADMIN — SODIUM CHLORIDE 1000 ML/HR: 9 INJECTION, SOLUTION INTRAVENOUS at 09:58

## 2021-01-01 RX ADMIN — DEXAMETHASONE SODIUM PHOSPHATE 4 MG: 4 INJECTION, SOLUTION INTRA-ARTICULAR; INTRALESIONAL; INTRAMUSCULAR; INTRAVENOUS; SOFT TISSUE at 09:55

## 2021-01-01 RX ADMIN — INSULIN HUMAN 10 UNITS: 100 INJECTION, SOLUTION PARENTERAL at 06:55

## 2021-01-01 RX ADMIN — ONDANSETRON 4 MG: 2 INJECTION INTRAMUSCULAR; INTRAVENOUS at 02:58

## 2021-01-01 RX ADMIN — SODIUM CHLORIDE 75 ML/HR: 9 INJECTION, SOLUTION INTRAVENOUS at 18:01

## 2021-01-01 RX ADMIN — SODIUM CHLORIDE, PRESERVATIVE FREE 10 ML: 5 INJECTION INTRAVENOUS at 20:49

## 2021-01-01 RX ADMIN — MEGESTROL ACETATE 80 MG: 40 TABLET ORAL at 22:44

## 2021-01-01 RX ADMIN — FAMOTIDINE 20 MG: 20 TABLET ORAL at 06:42

## 2021-01-01 RX ADMIN — INSULIN LISPRO 5 UNITS: 100 INJECTION, SOLUTION INTRAVENOUS; SUBCUTANEOUS at 17:48

## 2021-01-01 RX ADMIN — DEXAMETHASONE SODIUM PHOSPHATE 4 MG: 4 INJECTION, SOLUTION INTRA-ARTICULAR; INTRALESIONAL; INTRAMUSCULAR; INTRAVENOUS; SOFT TISSUE at 17:16

## 2021-01-01 RX ADMIN — DEXAMETHASONE SODIUM PHOSPHATE 4 MG: 4 INJECTION, SOLUTION INTRA-ARTICULAR; INTRALESIONAL; INTRAMUSCULAR; INTRAVENOUS; SOFT TISSUE at 10:00

## 2021-01-01 RX ADMIN — DOCUSATE SODIUM 200 MG: 100 CAPSULE, LIQUID FILLED ORAL at 09:54

## 2021-01-01 RX ADMIN — INSULIN LISPRO 3 UNITS: 100 INJECTION, SOLUTION INTRAVENOUS; SUBCUTANEOUS at 09:53

## 2021-01-01 RX ADMIN — DEXAMETHASONE SODIUM PHOSPHATE 4 MG: 4 INJECTION, SOLUTION INTRA-ARTICULAR; INTRALESIONAL; INTRAMUSCULAR; INTRAVENOUS; SOFT TISSUE at 18:02

## 2021-01-01 RX ADMIN — MORPHINE SULFATE 30 MG: 30 TABLET, FILM COATED, EXTENDED RELEASE ORAL at 22:36

## 2021-01-01 RX ADMIN — NYSTATIN 500000 UNITS: 100000 SUSPENSION ORAL at 18:06

## 2021-01-01 RX ADMIN — HYDROMORPHONE HYDROCHLORIDE 0.5 MG: 1 INJECTION, SOLUTION INTRAMUSCULAR; INTRAVENOUS; SUBCUTANEOUS at 02:57

## 2021-01-01 RX ADMIN — DEXAMETHASONE SODIUM PHOSPHATE 4 MG: 4 INJECTION, SOLUTION INTRA-ARTICULAR; INTRALESIONAL; INTRAMUSCULAR; INTRAVENOUS; SOFT TISSUE at 05:40

## 2021-01-01 RX ADMIN — CASTOR OIL AND BALSAM, PERU: 788; 87 OINTMENT TOPICAL at 23:58

## 2021-01-01 RX ADMIN — INSULIN LISPRO 8 UNITS: 100 INJECTION, SOLUTION INTRAVENOUS; SUBCUTANEOUS at 05:38

## 2021-01-01 RX ADMIN — PALONOSETRON HYDROCHLORIDE 0.25 MG: 0.05 INJECTION, SOLUTION INTRAVENOUS at 10:20

## 2021-01-01 RX ADMIN — MORPHINE SULFATE 30 MG: 30 TABLET, FILM COATED, EXTENDED RELEASE ORAL at 09:41

## 2021-01-01 RX ADMIN — BUSPIRONE HYDROCHLORIDE 5 MG: 5 TABLET ORAL at 22:36

## 2021-01-01 RX ADMIN — Medication 3 G: at 07:43

## 2021-01-01 RX ADMIN — MORPHINE SULFATE 30 MG: 30 TABLET, FILM COATED, EXTENDED RELEASE ORAL at 09:54

## 2021-01-01 RX ADMIN — OXYCODONE 10 MG: 5 TABLET ORAL at 17:55

## 2021-01-01 RX ADMIN — SODIUM CHLORIDE, POTASSIUM CHLORIDE, SODIUM LACTATE AND CALCIUM CHLORIDE 9 ML/HR: 600; 310; 30; 20 INJECTION, SOLUTION INTRAVENOUS at 06:35

## 2021-01-01 RX ADMIN — DEXAMETHASONE SODIUM PHOSPHATE 4 MG: 4 INJECTION, SOLUTION INTRA-ARTICULAR; INTRALESIONAL; INTRAMUSCULAR; INTRAVENOUS; SOFT TISSUE at 23:00

## 2021-01-01 RX ADMIN — DEXAMETHASONE SODIUM PHOSPHATE 4 MG: 4 INJECTION, SOLUTION INTRA-ARTICULAR; INTRALESIONAL; INTRAMUSCULAR; INTRAVENOUS; SOFT TISSUE at 12:26

## 2021-01-01 RX ADMIN — INSULIN LISPRO 5 UNITS: 100 INJECTION, SOLUTION INTRAVENOUS; SUBCUTANEOUS at 12:48

## 2021-01-01 RX ADMIN — ONDANSETRON 4 MG: 2 INJECTION INTRAMUSCULAR; INTRAVENOUS at 12:26

## 2021-01-01 RX ADMIN — NYSTATIN 500000 UNITS: 100000 SUSPENSION ORAL at 12:18

## 2021-01-01 RX ADMIN — DEXAMETHASONE SODIUM PHOSPHATE 4 MG: 4 INJECTION, SOLUTION INTRA-ARTICULAR; INTRALESIONAL; INTRAMUSCULAR; INTRAVENOUS; SOFT TISSUE at 12:09

## 2021-01-01 RX ADMIN — DOCUSATE SODIUM 200 MG: 100 CAPSULE, LIQUID FILLED ORAL at 17:55

## 2021-01-01 RX ADMIN — MEGESTROL ACETATE 80 MG: 40 TABLET ORAL at 09:45

## 2021-01-01 RX ADMIN — INSULIN HUMAN 10 UNITS: 100 INJECTION, SOLUTION PARENTERAL at 12:18

## 2021-01-01 RX ADMIN — PANTOPRAZOLE SODIUM 40 MG: 40 TABLET, DELAYED RELEASE ORAL at 17:15

## 2021-01-01 RX ADMIN — SODIUM CHLORIDE, POTASSIUM CHLORIDE, SODIUM LACTATE AND CALCIUM CHLORIDE: 600; 310; 30; 20 INJECTION, SOLUTION INTRAVENOUS at 07:36

## 2021-01-01 RX ADMIN — SODIUM CHLORIDE 250 ML: 9 INJECTION, SOLUTION INTRAVENOUS at 10:20

## 2021-01-01 RX ADMIN — LIDOCAINE HYDROCHLORIDE 50 MG: 10 INJECTION, SOLUTION EPIDURAL; INFILTRATION; INTRACAUDAL; PERINEURAL at 07:39

## 2021-01-01 RX ADMIN — POTASSIUM CHLORIDE 40 MEQ: 1.5 POWDER, FOR SOLUTION ORAL at 03:41

## 2021-01-01 RX ADMIN — MORPHINE SULFATE 30 MG: 30 TABLET, FILM COATED, EXTENDED RELEASE ORAL at 09:27

## 2021-01-01 RX ADMIN — ROCURONIUM BROMIDE 20 MG: 10 INJECTION, SOLUTION INTRAVENOUS at 09:51

## 2021-01-01 RX ADMIN — NYSTATIN 500000 UNITS: 100000 SUSPENSION ORAL at 09:22

## 2021-01-01 RX ADMIN — INSULIN LISPRO 10 UNITS: 100 INJECTION, SOLUTION INTRAVENOUS; SUBCUTANEOUS at 13:19

## 2021-01-01 RX ADMIN — SENNOSIDES 2 TABLET: 8.6 TABLET, FILM COATED ORAL at 22:44

## 2021-01-01 RX ADMIN — FENTANYL CITRATE 50 MCG: 50 INJECTION, SOLUTION INTRAMUSCULAR; INTRAVENOUS at 11:09

## 2021-01-01 RX ADMIN — DEXAMETHASONE SODIUM PHOSPHATE 4 MG: 4 INJECTION, SOLUTION INTRA-ARTICULAR; INTRALESIONAL; INTRAMUSCULAR; INTRAVENOUS; SOFT TISSUE at 22:04

## 2021-01-01 RX ADMIN — POLYETHYLENE GLYCOL 3350 17 G: 17 POWDER, FOR SOLUTION ORAL at 17:56

## 2021-01-01 RX ADMIN — PANTOPRAZOLE SODIUM 40 MG: 40 TABLET, DELAYED RELEASE ORAL at 17:55

## 2021-01-01 RX ADMIN — PANTOPRAZOLE SODIUM 40 MG: 40 TABLET, DELAYED RELEASE ORAL at 10:01

## 2021-01-01 RX ADMIN — NYSTATIN 500000 UNITS: 100000 SUSPENSION ORAL at 21:48

## 2021-01-01 RX ADMIN — SODIUM CHLORIDE, PRESERVATIVE FREE 10 ML: 5 INJECTION INTRAVENOUS at 09:23

## 2021-01-01 RX ADMIN — DEXAMETHASONE SODIUM PHOSPHATE 4 MG: 4 INJECTION, SOLUTION INTRA-ARTICULAR; INTRALESIONAL; INTRAMUSCULAR; INTRAVENOUS; SOFT TISSUE at 23:46

## 2021-01-01 RX ADMIN — FENTANYL CITRATE 25 MCG: 50 INJECTION, SOLUTION INTRAMUSCULAR; INTRAVENOUS at 08:54

## 2021-01-01 RX ADMIN — BUSPIRONE HYDROCHLORIDE 5 MG: 5 TABLET ORAL at 21:12

## 2021-01-01 RX ADMIN — DIPHENHYDRAMINE HYDROCHLORIDE 50 MG: 50 INJECTION INTRAMUSCULAR; INTRAVENOUS at 10:47

## 2021-01-01 RX ADMIN — LEVETIRACETAM 1000 MG: 10 INJECTION INTRAVENOUS at 18:02

## 2021-01-01 RX ADMIN — LEVOTHYROXINE SODIUM 250 MCG: 125 TABLET ORAL at 09:27

## 2021-01-01 RX ADMIN — DEXAMETHASONE SODIUM PHOSPHATE 20 MG: 10 INJECTION, SOLUTION INTRAMUSCULAR; INTRAVENOUS at 10:23

## 2021-01-01 RX ADMIN — BUSPIRONE HYDROCHLORIDE 5 MG: 5 TABLET ORAL at 17:15

## 2021-01-01 RX ADMIN — ROCURONIUM BROMIDE 20 MG: 10 INJECTION, SOLUTION INTRAVENOUS at 12:03

## 2021-01-01 RX ADMIN — ENOXAPARIN SODIUM 40 MG: 40 INJECTION SUBCUTANEOUS at 17:56

## 2021-01-01 RX ADMIN — LEVETIRACETAM 1000 MG: 10 INJECTION INTRAVENOUS at 05:38

## 2021-01-01 RX ADMIN — NYSTATIN 500000 UNITS: 100000 SUSPENSION ORAL at 22:35

## 2021-01-01 RX ADMIN — DEXAMETHASONE SODIUM PHOSPHATE 4 MG: 4 INJECTION, SOLUTION INTRA-ARTICULAR; INTRALESIONAL; INTRAMUSCULAR; INTRAVENOUS; SOFT TISSUE at 22:02

## 2021-01-01 RX ADMIN — INSULIN LISPRO 5 UNITS: 100 INJECTION, SOLUTION INTRAVENOUS; SUBCUTANEOUS at 23:58

## 2021-01-01 RX ADMIN — INSULIN LISPRO 5 UNITS: 100 INJECTION, SOLUTION INTRAVENOUS; SUBCUTANEOUS at 12:18

## 2021-01-01 RX ADMIN — ONDANSETRON 4 MG: 2 INJECTION INTRAMUSCULAR; INTRAVENOUS at 06:08

## 2021-01-01 RX ADMIN — PROPOFOL 200 MG: 10 INJECTION, EMULSION INTRAVENOUS at 07:39

## 2021-01-01 RX ADMIN — PANTOPRAZOLE SODIUM 40 MG: 40 TABLET, DELAYED RELEASE ORAL at 09:27

## 2021-01-01 RX ADMIN — INSULIN LISPRO 5 UNITS: 100 INJECTION, SOLUTION INTRAVENOUS; SUBCUTANEOUS at 02:53

## 2021-01-01 RX ADMIN — LEVOTHYROXINE SODIUM 250 MCG: 125 TABLET ORAL at 05:19

## 2021-01-01 RX ADMIN — PANTOPRAZOLE SODIUM 40 MG: 40 TABLET, DELAYED RELEASE ORAL at 17:48

## 2021-01-01 RX ADMIN — BUSPIRONE HYDROCHLORIDE 5 MG: 5 TABLET ORAL at 21:48

## 2021-01-01 RX ADMIN — LEVETIRACETAM 1000 MG: 10 INJECTION INTRAVENOUS at 17:49

## 2021-01-01 RX ADMIN — BUSPIRONE HYDROCHLORIDE 5 MG: 5 TABLET ORAL at 18:03

## 2021-01-01 RX ADMIN — ROCURONIUM BROMIDE 20 MG: 10 INJECTION, SOLUTION INTRAVENOUS at 08:08

## 2021-01-01 RX ADMIN — DEXAMETHASONE SODIUM PHOSPHATE 8 MG: 4 INJECTION, SOLUTION INTRA-ARTICULAR; INTRALESIONAL; INTRAMUSCULAR; INTRAVENOUS; SOFT TISSUE at 07:52

## 2021-01-01 RX ADMIN — GLYCOPYRROLATE 0.8 MG: 0.4 INJECTION INTRAMUSCULAR; INTRAVENOUS at 13:02

## 2021-01-01 RX ADMIN — BARIUM SULFATE 100 ML: 0.81 POWDER, FOR SUSPENSION ORAL at 11:34

## 2021-01-01 RX ADMIN — PANTOPRAZOLE SODIUM 40 MG: 40 TABLET, DELAYED RELEASE ORAL at 09:54

## 2021-01-01 RX ADMIN — OXYCODONE 10 MG: 5 TABLET ORAL at 02:40

## 2021-01-01 RX ADMIN — LEVETIRACETAM 1000 MG: 10 INJECTION INTRAVENOUS at 17:16

## 2021-01-01 RX ADMIN — BUSPIRONE HYDROCHLORIDE 5 MG: 5 TABLET ORAL at 17:48

## 2021-01-01 RX ADMIN — SODIUM CHLORIDE 75 ML/HR: 9 INJECTION, SOLUTION INTRAVENOUS at 12:13

## 2021-01-01 RX ADMIN — INSULIN LISPRO 12 UNITS: 100 INJECTION, SOLUTION INTRAVENOUS; SUBCUTANEOUS at 14:57

## 2021-01-01 RX ADMIN — ESMOLOL HYDROCHLORIDE 30 MG: 10 INJECTION, SOLUTION INTRAVENOUS at 10:22

## 2021-01-01 RX ADMIN — SODIUM CHLORIDE 75 ML/HR: 9 INJECTION, SOLUTION INTRAVENOUS at 02:53

## 2021-01-01 RX ADMIN — PANTOPRAZOLE SODIUM 40 MG: 40 TABLET, DELAYED RELEASE ORAL at 18:05

## 2021-01-01 RX ADMIN — PAROXETINE HYDROCHLORIDE 20 MG: 20 TABLET, FILM COATED ORAL at 10:01

## 2021-01-01 RX ADMIN — DEXAMETHASONE SODIUM PHOSPHATE 4 MG: 4 INJECTION, SOLUTION INTRA-ARTICULAR; INTRALESIONAL; INTRAMUSCULAR; INTRAVENOUS; SOFT TISSUE at 12:48

## 2021-01-01 RX ADMIN — SODIUM CHLORIDE, PRESERVATIVE FREE 10 ML: 5 INJECTION INTRAVENOUS at 00:02

## 2021-01-01 RX ADMIN — DEXAMETHASONE SODIUM PHOSPHATE 4 MG: 4 INJECTION, SOLUTION INTRA-ARTICULAR; INTRALESIONAL; INTRAMUSCULAR; INTRAVENOUS; SOFT TISSUE at 04:41

## 2021-01-01 RX ADMIN — INSULIN LISPRO 5 UNITS: 100 INJECTION, SOLUTION INTRAVENOUS; SUBCUTANEOUS at 23:46

## 2021-01-01 RX ADMIN — NYSTATIN 500000 UNITS: 100000 SUSPENSION ORAL at 20:41

## 2021-01-01 RX ADMIN — FENTANYL CITRATE 50 MCG: 50 INJECTION, SOLUTION INTRAMUSCULAR; INTRAVENOUS at 07:39

## 2021-01-01 RX ADMIN — SODIUM CHLORIDE 75 ML/HR: 9 INJECTION, SOLUTION INTRAVENOUS at 00:26

## 2021-01-01 RX ADMIN — POLYETHYLENE GLYCOL 3350 17 G: 17 POWDER, FOR SOLUTION ORAL at 10:07

## 2021-01-01 RX ADMIN — SODIUM CHLORIDE 1 G: 900 INJECTION INTRAVENOUS at 23:42

## 2021-01-01 RX ADMIN — IOPAMIDOL 100 ML: 755 INJECTION, SOLUTION INTRAVENOUS at 21:08

## 2021-01-01 RX ADMIN — ROCURONIUM BROMIDE 20 MG: 10 INJECTION, SOLUTION INTRAVENOUS at 11:16

## 2021-01-01 RX ADMIN — SODIUM CHLORIDE, PRESERVATIVE FREE 10 ML: 5 INJECTION INTRAVENOUS at 21:49

## 2021-01-01 RX ADMIN — ACETAMINOPHEN 1000 MG: 500 TABLET, FILM COATED ORAL at 06:42

## 2021-01-01 RX ADMIN — LEVETIRACETAM 1000 MG: 10 INJECTION INTRAVENOUS at 18:45

## 2021-06-03 PROBLEM — C54.1 ENDOMETRIAL CANCER (HCC): Status: ACTIVE | Noted: 2021-01-01

## 2021-06-03 NOTE — PROGRESS NOTES
New Patient Office Visit      Patient Name: Shannan Jha  : 1970   MRN: [id number]     Referring Physician: Nancy Pearce*    Chief Complaint:    Chief Complaint   Patient presents with   • Uterine Cancer       History of Present Illness: Shannan Jha is a 50 y.o. female who is here today as a consultation for endometrial cancer. Patient presented to Dr. Liu in May of this year for complaints of irregular spotting for the past year.  Over the past 2 to 3 months she also reported pain and swelling in her lower abdomen with increasing bleeding.  She is now bleeding with a flow that approximates heavy period.  She does underwent an exam that demonstrated some sort of fungating or prolapsing mass coming through the cervix.  This was biopsied and sent for pathology that resulted as adenocarcinoma consistent with possible endometrial cancer.  The patient presents here to discuss neck steps.  Of note she continues have abdominal pain that she rates a 6 out of 10.  Tylenol is not helping with this.  She is interested in something stronger for pain.    Oncologic History:  Oncology/Hematology History   Endometrial cancer (CMS/HCC)   6/3/2021 Initial Diagnosis    Referred by Dr. Nancy Pearce    2021: TVUS with uterus measuring 3.4 x 6.8 x 6.4 cm with an endometrial stripe thickness of 3.2 mm.  Left and right ovaries normal.  2021: Biopsy of cervical polyp demonstrates carcinoma most consistent with endometrial adenocarcinoma          Past Medical History:   Past Medical History:   Diagnosis Date   • Adenocarcinoma (CMS/HCC)    • Anxiety    • Depression    • Diabetes (CMS/HCC)    • Heart murmur    • Thyroid disease        Past Surgical History:   Past Surgical History:   Procedure Laterality Date   • GALLBLADDER SURGERY         Family History:   Family History   Problem Relation Age of Onset   • Diabetes Mother    • Heart attack Mother    • Heart disease Mother    • Stroke Mother    •  Arthritis Father    • Cancer Father         Bladder   • Thyroid disease Father    • Diabetes Brother    • Thyroid disease Brother        Social History:   Social History     Socioeconomic History   • Marital status:      Spouse name: Not on file   • Number of children: Not on file   • Years of education: Not on file   • Highest education level: Not on file   Tobacco Use   • Smoking status: Never Smoker   • Smokeless tobacco: Never Used   Substance and Sexual Activity   • Alcohol use: Never   • Drug use: Never       Past OB/GYN History:   OB History   No obstetric history on file.   G0  History of irregular menses with constant bleeding for the past 2 to 3 months.  Believes her last Pap test was in 2015.  Denies any history of abnormal Pap test.  Menarche age of 13.  Denies any use of hormonal contraception or replacement therapy.  Denies any gynecologic issues.    Health Maintenance:   Mammogram: Date: Has not had   Colonoscopy: Date: Has not had     Medications:     Current Outpatient Medications:   •  busPIRone (BUSPAR) 10 MG tablet, buspirone 10 mg tablet  take 1 tablet by mouth three times a day, Disp: , Rfl:   •  Dapagliflozin Propanediol (Farxiga) 10 MG tablet, Farxiga 10 mg tablet  Take 1 tablet every day by oral route., Disp: , Rfl:   •  glimepiride (AMARYL) 2 MG tablet, glimepiride 2 mg tablet  TAKE 1 TABLET BY MOUTH TWICE A DAY, Disp: , Rfl:   •  HYDROcodone-acetaminophen (NORCO) 5-325 MG per tablet, Take 1 tablet by mouth Every 6 (Six) Hours As Needed for Moderate Pain ., Disp: 30 tablet, Rfl: 0  •  Insulin Regular Human, Conc, (HumuLIN R U-500 KwikPen) 500 UNIT/ML solution pen-injector CONCENTRATED injection, Humulin R U-500 (Conc) Insulin Kwikpen 500 unit/mL (3 mL) subcutaneous  180 acb 190 units ac lunch, 190 units ac supper E11.8, Disp: , Rfl:   •  levothyroxine (SYNTHROID, LEVOTHROID) 125 MCG tablet, levothyroxine 125 mcg tablet  TAKE 2 TABLETS BY MOUTH 6 DAYS A WEEK EVERY DAY, Disp: , Rfl:    •  lisinopril (PRINIVIL,ZESTRIL) 20 MG tablet, Take 20 mg by mouth Daily., Disp: , Rfl:   •  metFORMIN ER (GLUCOPHAGE-XR) 500 MG 24 hr tablet, metformin  mg tablet,extended release 24 hr  TAKE 4 TABLETS BY MOUTH AT BEDTIME, Disp: , Rfl:   •  omeprazole (PrilOSEC) 20 MG capsule, Prilosec 20 mg capsule,delayed release  Every night at bedtime, Disp: , Rfl:   •  PARoxetine (PAXIL) 40 MG tablet, Take 40 mg by mouth Daily., Disp: , Rfl:   •  rosuvastatin (CRESTOR) 40 MG tablet, rosuvastatin 40 mg tablet  Take 1 tablet every day by oral route in the evening for 90 days., Disp: , Rfl:   •  traMADol (ULTRAM) 50 MG tablet, Take 1 tablet by mouth Every 6 (Six) Hours As Needed for Moderate Pain ., Disp: 30 tablet, Rfl: 0    Allergies:   Allergies   Allergen Reactions   • Codeine Dizziness       Review of Systems:   Review of Systems   Constitutional: Positive for activity change and fatigue. Negative for appetite change, chills, fever and unexpected weight change.   HENT: Negative for congestion, hearing loss, sneezing, sore throat and tinnitus.    Eyes: Negative for visual disturbance.   Respiratory: Negative for cough, shortness of breath and wheezing.    Cardiovascular: Negative for chest pain, palpitations and leg swelling.   Gastrointestinal: Positive for abdominal pain. Negative for abdominal distention, constipation, diarrhea, nausea and vomiting.   Genitourinary: Positive for frequency, pelvic pain and urgency. Negative for dyspareunia, dysuria, hematuria and vaginal bleeding.   Musculoskeletal: Negative for arthralgias, back pain and myalgias.   Skin: Negative for color change, pallor and rash.   Neurological: Negative for dizziness, light-headedness, numbness and headaches.   Hematological: Negative for adenopathy. Does not bruise/bleed easily.   Psychiatric/Behavioral: Negative for dysphoric mood. The patient is not nervous/anxious.         Objective     Physical Exam:  Vital Signs:   Vitals:    06/03/21 1056  "  BP: 144/95   Pulse: 98   Resp: 18   Temp: 96.9 °F (36.1 °C)   TempSrc: Temporal   SpO2: 98%   Weight: 108 kg (238 lb)   Height: 172.7 cm (68\")   PainSc:   6     BMI: Body mass index is 36.19 kg/m².   ECOG PS: 1              PHQ-2 Depression Screening  Little interest or pleasure in doing things? 0   Feeling down, depressed, or hopeless? 1   PHQ-2 Total Score 1     Physical Exam  Vitals and nursing note reviewed. Exam conducted with a chaperone present.   Constitutional:       General: She is not in acute distress.     Appearance: Normal appearance. She is well-developed. She is not diaphoretic.   HENT:      Head: Normocephalic and atraumatic.      Right Ear: External ear normal.      Left Ear: External ear normal.      Nose: Nose normal.   Eyes:      General: No scleral icterus.        Right eye: No discharge.         Left eye: No discharge.      Conjunctiva/sclera: Conjunctivae normal.   Neck:      Thyroid: No thyromegaly.   Cardiovascular:      Rate and Rhythm: Normal rate and regular rhythm.      Heart sounds: No murmur heard.     Pulmonary:      Effort: Pulmonary effort is normal. No respiratory distress.      Breath sounds: Normal breath sounds. No wheezing.   Abdominal:      General: Bowel sounds are normal. There is no distension.      Palpations: Abdomen is soft. There is no mass.      Tenderness: There is abdominal tenderness (Bilateral lower quadrants). There is no guarding.      Hernia: No hernia is present.      Comments: Umbilical hernia. Reducible. Surgical incision below the umbilicus.   Genitourinary:     Comments: External genitalia normal. Vagina with approximately 10 cc of bloody discharge in vault.  Uterus very high in pelvis and difficult to palpate patient with tenderness in bilateral lower quadrants.  Cervix appears nulliparous and normal.  No adnexal masses. Rectovaginal exam deferred.  Musculoskeletal:         General: No swelling, tenderness, deformity or signs of injury.      Cervical " back: Normal range of motion and neck supple.      Right lower leg: No edema.      Left lower leg: No edema.   Lymphadenopathy:      Cervical: No cervical adenopathy.   Skin:     General: Skin is warm and dry.      Findings: No erythema, lesion or rash.   Neurological:      General: No focal deficit present.      Mental Status: She is alert and oriented to person, place, and time. Mental status is at baseline.   Psychiatric:         Behavior: Behavior normal.         Thought Content: Thought content normal.       Imagin2021: TVUS with uterus measuring 3.4 x 6.8 x 6.4 cm with an endometrial stripe thickness of 3.2 mm.  Left and right ovaries normal.    Pathology:  2021: Biopsy of cervical polyp demonstrates carcinoma most consistent with endometrial adenocarcinoma  Assessment / Plan    Shannan Jha is a 50 y.o. year old female who is recently diagnosed with adenocarcinoma most consistent with endometrial origin. We discussed surgical management via robotic hysterectomy, bilateral salpingo-oophorectomy with sentinel lymph node evaluation +/- complete lymphadenectomy as indicated. The patient was also counseled extensively on the nature of endometrial cancer and the fact that a majority are found in the early stages. However, if more advanced disease is encountered, she may require chemotherapy, radiation or a combination of the two. We also discussed risks of the procedure including bleeding, infection, wound breakdown, blood clots, injury to surrounding organs including the bowel, bladder, blood vessels, nerves, and ureters requiring additional intervention or procedures. We also discussed the risk of development of lymphedema particularly if a complete lymphadenectomy is needed. and the need for a laparotomy if the surgery cannot be performed safely via the minimally invasive approach. We also discussed the anticipated hospital stay and recovery time. All of the patient's questions were answered  satisfactorily and verbal consent was obtained.    Cancer-related pain: Patient with 6 out of 10 pelvic pain.  Reports that pain is not improved with Tylenol.  Asked patient to avoid ibuprofen for the 5 days prior to surgery.  She is asking for something slightly stronger for pain.  Prescription for tramadol 50 mg tablets sent to patient's pharmacy.  History of diabetes: Currently on insulin, glimepiride, typically flows in and Metformin.  Hemoglobin A1c ordered prior to surgery to determine current level of glycemic control.  She will need to hold her oral hyperglycemics the day of surgery.  Discussed risks of uncontrolled diabetes on wound healing and infection risk.  Hypertension: Mild range blood pressures today.  Continue home lisinopril.  Will obtain EKG prior to surgery.  Anxiety/depression: Continue on patient's home BuSpar and Paxil as needed.    Pain assessment was performed today as a part of patient’s care.  For patients with pain related to surgery, gynecologic malignancy or cancer treatment, the plan is as noted in the assessment/plan.  For patients with pain not related to these issues, they are to seek any further needed care from a more appropriate provider, such as PCP.      Diagnoses and all orders for this visit:    1. Endometrial cancer (CMS/HCC) (Primary)  -     CBC & Differential; Future  -     Comprehensive Metabolic Panel; Future  -     XR Chest 2 View; Future  -     Case Request; Standing  -     ECG 12 Lead; Future  -     Type & Screen; Future  -     Case Request  -     traMADol (ULTRAM) 50 MG tablet; Take 1 tablet by mouth Every 6 (Six) Hours As Needed for Moderate Pain .  Dispense: 30 tablet; Refill: 0    2. Cancer related pain  -     traMADol (ULTRAM) 50 MG tablet; Take 1 tablet by mouth Every 6 (Six) Hours As Needed for Moderate Pain .  Dispense: 30 tablet; Refill: 0    3. Type 2 diabetes mellitus without complication, with long-term current use of insulin (CMS/HCC)    4. Essential  hypertension    5. Depression with anxiety    Other orders  -     SCANNED - IMAGING  -     SCANNED PATHOLOGY  -     Verify NPO Status; Future  -     Obtain Informed Consent; Future  -     Provide Instructions to Patient on NPO Status; Future  -     Chlorhexidine Skin Prep; Future  -     Instruct Patient on Coughing, Deep Breathing & Incentive Spirometry; Future  -     Verify NPO Status; Standing  -     Obtain Informed Consent If Not Already Obtained; Standing  -     Notify Physician - Standard; Standing  -     Initiate Observation Status; Standing  -     heparin (porcine) 5000 UNIT/ML injection 5,000 Units  -     ceFAZolin (ANCEF) 3 g in sodium chloride 0.9 % 100 mL IVPB  -     acetaminophen (TYLENOL) tablet 975 mg    I spent 60 minutes caring for Shannan on this date of service. This time includes time spent by me in the following activities: preparing for the visit, reviewing tests, performing a medically appropriate examination and/or evaluation, counseling and educating the patient/family/caregiver, ordering medications, tests, or procedures, referring and communicating with other health care professionals, documenting information in the medical record and care coordination.       Follow Up: Porsha Jensen MD  Gynecologic Oncology     Please note that portions of this note may have been completed with a voice recognition program.

## 2021-06-03 NOTE — TELEPHONE ENCOUNTER
Caller: ANNITA BRUNO    Relationship: SELF    Best call back number: [phone]    What medication are you requesting: TRAMADOL    What are your current symptoms: PAIN    How long have you been experiencing symptoms: 2 MONTHS     Have you had these symptoms before:    [] Yes  [x] No    Have you been treated for these symptoms before:   [] Yes  [x] No    If a prescription is needed, what is your preferred pharmacy and phone number:    Pharmacy    [id number] - [place], [place] - [address] AT [facility] [address] - [phone]  - [phone]    [address] [facility] [address]   Phone:  [phone]  Fax:  [phone]     Additional notes: PT STATES DR VAZQUEZ ADVISED WAS SENDING IN A PRESCRIPTION FOR TRAMADOL WHEN SHE WAS AT THE OFFICE TODAY.

## 2021-06-03 NOTE — TELEPHONE ENCOUNTER
I called patient and let her know that Dr. Jensen sent the prescription to the pharmacy just now. She verbalized understanding.

## 2021-06-07 NOTE — TELEPHONE ENCOUNTER
Rec'd [employer] FMLA Form for pt's spouse, Stephen Jha.  Mailed letter requesting signed JOLYNN, JOLYNN form enclosed.

## 2021-06-07 NOTE — TELEPHONE ENCOUNTER
Pt called after receiving a notification of JOLYNN requesting letter via [employer].  Rec'd pt's verbal consent.  Notified her that letter was mailed and for her to mail back signed JOLYNN.  Pt v/u.    Completed FMLA pw, rec'd with MD signature/date, faxed to [employer] @ [employer]#[phone].

## 2021-06-10 NOTE — TELEPHONE ENCOUNTER
Pt rec'd JOLYNN form via mail, she has signed/dated and wondered if she may e-mail it to me.  Gave pt my confidential work e-mail, pt v/u and appreciation.

## 2021-06-18 NOTE — ANESTHESIA PREPROCEDURE EVALUATION
Anesthesia Evaluation     Patient summary reviewed and Nursing notes reviewed   no history of anesthetic complications:  NPO Solid Status: > 8 hours  NPO Liquid Status: > 2 hours           Airway   Mallampati: II  TM distance: >3 FB  Neck ROM: full  Possible difficult intubation  Dental - normal exam     Pulmonary    (+) decreased breath sounds,   Cardiovascular   Exercise tolerance: good (4-7 METS)    Rhythm: regular  Rate: normal    (+) hypertension well controlled 2 medications or greater, valvular problems/murmurs murmur, murmur, hyperlipidemia,       Neuro/Psych  (+) psychiatric history,     GI/Hepatic/Renal/Endo    (+)  GERD well controlled,  diabetes mellitus type 2 well controlled,     Musculoskeletal     Abdominal   (+) obese,     Abdomen: soft.   Substance History      OB/GYN          Other   arthritis,    history of cancer active                    Anesthesia Plan    ASA 3     general     intravenous induction     Anesthetic plan, all risks, benefits, and alternatives have been provided, discussed and informed consent has been obtained with: patient.    Plan discussed with CRNA.

## 2021-06-18 NOTE — ANESTHESIA PROCEDURE NOTES
Airway  Urgency: elective    Date/Time: 6/18/2021 7:40 AM  Airway not difficult    General Information and Staff    Patient location during procedure: OR    Indications and Patient Condition  Indications for airway management: airway protection    Preoxygenated: yes  MILS maintained throughout  Mask difficulty assessment: 0 - not attempted    Final Airway Details  Final airway type: endotracheal airway      Successful airway: ETT  Cuffed: yes   Successful intubation technique: direct laryngoscopy  Endotracheal tube insertion site: oral  Blade: Hackett  Blade size: 2  ETT size (mm): 7.5  Cormack-Lehane Classification: grade IIa - partial view of glottis  Placement verified by: chest auscultation and capnometry   Measured from: lips  ETT/EBT  to lips (cm): 21  Number of attempts at approach: 1  Assessment: lips, teeth, and gum same as pre-op and atraumatic intubation

## 2021-06-18 NOTE — ANESTHESIA POSTPROCEDURE EVALUATION
Patient: Shannan Jha    Procedure Summary     Date: 06/18/21 Room / Location:  [facility] /  [facility]    Anesthesia Start: 0736 Anesthesia Stop: 1339    Procedure: TOTAL LAPAROSCOPIC RADICAL TUMOR DEBULKING WITH TOTAL HYSTERECTOMY (UTERUS > 250 g), BILATERAL SALPINGO-OOPHORECTOMY, LEFT URETROLYSIS, PERTIONEAL STRIPPING, LYSIS OF ADHESIONS (>90 MINUTES), CYSTOSCOPY WITH LEFT TEMPORARY STENT PLACEMENT, AND INJECTION FOR SENTINEL LYMPH NODE DISSECTION (N/A Abdomen) Diagnosis:       Endometrial cancer (CMS/HCC)      (Endometrial cancer (CMS/HCC) [C54.1])    Surgeons: Jolie Jensen MD Provider: Yehuda Erickson MD    Anesthesia Type: general ASA Status: 3          Anesthesia Type: general    Vitals  Vitals Value Taken Time   /68 06/18/21 1335   Temp 99 °F (37.2 °C) 06/18/21 1330   Pulse 101 06/18/21 1340   Resp 20 06/18/21 1330   SpO2 94 % 06/18/21 1340   Vitals shown include unvalidated device data.        Post Anesthesia Care and Evaluation    Patient location during evaluation: PACU  Patient participation: complete - patient cannot participate  Level of consciousness: sleepy but conscious  Pain score: 1  Pain management: satisfactory to patient  Airway patency: patent  Anesthetic complications: No anesthetic complications  PONV Status: none  Cardiovascular status: acceptable  Respiratory status: nasal cannula, unassisted and spontaneous ventilation  Hydration status: acceptable

## 2021-06-25 NOTE — PROGRESS NOTES
Naomie Perez obtained and reviewed.  Patient offered an appointment to be seen today, Monday.  No further communications at this point from patient.  No further narcotics unless seen and evaluated.    Electronically signed by Ratna Rice MD, 06/25/21, 11:58 AM EDT.

## 2021-07-01 NOTE — TELEPHONE ENCOUNTER
Spoke with patient. Informed her that Dr. Jensen would like for her to be seen today. Patient stated she had no way to get her today since her  was working. I put her on hold and spoke with RN. RN states we can tell Dr. Jensen to see what next recommendation would be. Got back on the phone with patient and asked her again if there was no one that she could call. I told her even a friend could bring her because if she was having all of these problems, she really should be evaluated. Patient asked for the latest appointment available. I told her she could be seen at 315 this afternoon. Patient stated that she would call her  and he could leave work early and she would make the 315 today.

## 2021-07-06 NOTE — PROGRESS NOTES
"     Post Operative Office Visit      Patient Name: Shannan Jha  : 1970   MRN: [id number]     Chief Complaint:  Postoperative pain and hot flashes    History of Present Illness: Shannan Jha is a 50 y.o. female who is status post robotic assisted radical tumor debulking with total laparoscopic hysterectomy for uterus weighing greater than 250 g, bilateral salpingo-oophorectomy, left ureterolysis, peritoneal stripping, lysis of adhesions, cystoscopy with left temporary stent placement, and biopsy of clitoral mass on 2021 for endometrial cancer. She continues to have significant back and leg pain since surgery. She also reports decrease appetite and significant hot flashes. Of note, her  pointed out to me that the patient has a new mass on the left side of her chest that they noticed after surgery and that this is enlarging.    Medical, surgical, and family history updated as needed.  Subjective      Review of Systems:   Review of Systems   Constitutional: Positive for activity change, appetite change and fatigue. Negative for chills and fever.   Respiratory: Negative for cough, shortness of breath and wheezing.    Cardiovascular: Negative for chest pain, palpitations and leg swelling.   Gastrointestinal: Positive for abdominal pain and nausea. Negative for constipation, diarrhea and vomiting.   Genitourinary: Negative for dysuria, pelvic pain, urgency, vaginal bleeding, vaginal discharge and vaginal pain.   Musculoskeletal: Positive for arthralgias, back pain and myalgias.   Neurological: Negative for dizziness and light-headedness.        Objective     Physical Exam:  Vital Signs:   Vitals:    21 1507   BP: 138/76   Pulse: 92   Resp: 18   Temp: 96.6 °F (35.9 °C)   TempSrc: Temporal   Weight: 104 kg (229 lb)   Height: 175.3 cm (69.02\")   PainSc:   7     BMI: Body mass index is 33.8 kg/m².     ECOG performance status 1    Physical Exam  Vitals and nursing note reviewed. Exam conducted " with a chaperone present.   Constitutional:       General: She is not in acute distress.     Appearance: Normal appearance. She is well-developed. She is not diaphoretic.   HENT:      Head: Normocephalic and atraumatic.      Right Ear: External ear normal.      Left Ear: External ear normal.      Nose: Nose normal.   Eyes:      General: No scleral icterus.        Right eye: No discharge.         Left eye: No discharge.      Conjunctiva/sclera: Conjunctivae normal.   Neck:      Thyroid: No thyromegaly.   Cardiovascular:      Rate and Rhythm: Normal rate.   Pulmonary:      Effort: Pulmonary effort is normal. No respiratory distress.   Abdominal:      General: There is no distension.      Palpations: Abdomen is soft. There is no mass.      Tenderness: There is no abdominal tenderness. There is no guarding.   Genitourinary:     Comments: External genitalia normal. Vagina with clot at top of cuff. Vaginal apex palpates intact. Clitoral mass approximately 50% larger than noted intraoperatively. Uterus, cervix and adnexa surgically absent.  Musculoskeletal:         General: No swelling, tenderness, deformity or signs of injury.      Cervical back: Neck supple.      Right lower leg: No edema.      Left lower leg: No edema.   Lymphadenopathy:      Cervical: Cervical adenopathy (Large 3x3 cm mass just inferior to left collarbone that is concerning for a lymph node) present.   Skin:     General: Skin is warm and dry.      Coloration: Skin is not jaundiced.      Findings: No erythema, lesion or rash.   Neurological:      General: No focal deficit present.      Mental Status: She is alert and oriented to person, place, and time. Mental status is at baseline.      Motor: No abnormal muscle tone.   Psychiatric:         Behavior: Behavior normal.         Thought Content: Thought content normal.         Medications:     Current Outpatient Medications:   •  acetaminophen (Tylenol) 325 MG tablet, Take 2 tablets by mouth Every 6 (Six)  Hours As Needed for Mild Pain ., Disp: 60 tablet, Rfl: 0  •  Black Cohosh 40 MG capsule, Take 2 tablets by mouth Daily., Disp: , Rfl:   •  busPIRone (BUSPAR) 10 MG tablet, Take 10 mg by mouth 3 (Three) Times a Day., Disp: , Rfl:   •  Dapagliflozin Propanediol (Farxiga) 10 MG tablet, Take 10 mg by mouth Daily., Disp: , Rfl:   •  docusate sodium (COLACE) 100 MG capsule, Take 1 capsule by mouth 2 (Two) Times a Day., Disp: 60 capsule, Rfl: 0  •  gabapentin (NEURONTIN) 300 MG capsule, Take 2 capsules by mouth 3 (Three) Times a Day for 30 days., Disp: 180 capsule, Rfl: 0  •  glimepiride (AMARYL) 2 MG tablet, Take 2 mg by mouth 2 (two) times a day., Disp: , Rfl:   •  ibuprofen (ADVIL,MOTRIN) 800 MG tablet, Take 1 tablet by mouth Every 8 (Eight) Hours As Needed for Mild Pain ., Disp: 30 tablet, Rfl: 0  •  Insulin Regular Human, Conc, (HumuLIN R U-500 KwikPen) 500 UNIT/ML solution pen-injector CONCENTRATED injection, Inject  under the skin into the appropriate area as directed 3 (Three) Times a Day Before Meals. 180 units before breakfast, 190 units before lunch and supper, Disp: , Rfl:   •  levothyroxine (SYNTHROID, LEVOTHROID) 125 MCG tablet, Take 250 mcg by mouth Take As Directed. Take 2 tablets six days a week, Disp: , Rfl:   •  lisinopril (PRINIVIL,ZESTRIL) 20 MG tablet, Take 20 mg by mouth Daily., Disp: , Rfl:   •  metFORMIN ER (GLUCOPHAGE-XR) 500 MG 24 hr tablet, Take 2,000 mg by mouth Every Night., Disp: , Rfl:   •  multivitamin (Multi Vitamin) tablet tablet, Take 1 tablet by mouth Daily., Disp: , Rfl:   •  OMEGA-3 FATTY ACIDS PO, Take 2 tablets by mouth 2 (two) times a day., Disp: , Rfl:   •  omeprazole (PrilOSEC) 20 MG capsule, Take 20 mg by mouth Daily., Disp: , Rfl:   •  oxyCODONE (ROXICODONE) 5 MG immediate release tablet, Take 1 tablet by mouth Every 4 (Four) Hours As Needed for Moderate Pain ., Disp: 15 tablet, Rfl: 0  •  PARoxetine (PAXIL) 40 MG tablet, Take 40 mg by mouth Daily., Disp: , Rfl:   •   rosuvastatin (CRESTOR) 40 MG tablet, Take 40 mg by mouth Every Evening., Disp: , Rfl:   •  traMADol (ULTRAM) 50 MG tablet, Take 1-2 tablets by mouth Every 6 (Six) Hours As Needed for Moderate Pain  or Severe Pain ., Disp: 60 tablet, Rfl: 0  Current outpatient and discharge medications have been reconciled for the patient.  Reviewed by: Jolie Jensen MD      Allergies:   Allergies   Allergen Reactions   • Codeine Nausea Only and Dizziness       Pathology:   Operative findings again reviewed. Pathology pending.      Assessment / Plan    Shannan Jha is a 50 y.o. who presents status post robotic assisted radical tumor debulking with total laparoscopic hysterectomy for uterus weighing greater than 250 g, bilateral salpingo-oophorectomy, left ureterolysis, peritoneal stripping, lysis of adhesions, cystoscopy with left temporary stent placement, and biopsy of clitoral mass on 6/18/2021 for endometrial cancer. Today, she has the following complaints:    Chest wall mass: Patient with large mobile mass in the left chest wall. Given the aggressive appearance of her endometrial cancer at the time of surgery and the report that this mass is enlarging since being noted 1 week ago, I am concerned that this may represent metastatic disease. Will order CT C/A/P.     Pelvic pain: Patient describes neuropathic type pain. Will trial gabapentin and increased dose of tramadol. Will also obtain CT A/P.    Hot flushes: Suspect however she is due to postoperative menopause.  We discussed that generally the gold standard for treatment of these is hormone replacement therapy.  However with her diagnosis of endometrial cancer pathology pending I would like to hold on any hormone therapy in the event that this tumor is hormone receptor positive.  We did discuss that gabapentin has been shown to be effective for treatment of hot flashes so hopefully she will get some relief from this as well in addition to her improved pain control.   Patient verbalizes understanding.  We discussed other medications including low-dose Effexor or Paxil or addition of clonidine for hot flashes.  We will continue to follow-up.    I discussed with patient that her pathology is still pending is been sent out for second opinion.  I will call patient with these results as soon as I receive them.  I also reported the patient that I would call her with the results of her stat CT scan as soon as I receive them.  Patient verbalized understanding      Diagnoses and all orders for this visit:    1. Endometrial cancer (CMS/HCC) (Primary)  -     CT Abdomen Pelvis With Contrast; Future  -     CT Chest With Contrast; Future  -     traMADol (ULTRAM) 50 MG tablet; Take 1-2 tablets by mouth Every 6 (Six) Hours As Needed for Moderate Pain  or Severe Pain .  Dispense: 60 tablet; Refill: 0    2. Myalgia  -     gabapentin (NEURONTIN) 300 MG capsule; Take 2 capsules by mouth 3 (Three) Times a Day for 30 days.  Dispense: 180 capsule; Refill: 0    3. Menopausal hot flushes  -     gabapentin (NEURONTIN) 300 MG capsule; Take 2 capsules by mouth 3 (Three) Times a Day for 30 days.  Dispense: 180 capsule; Refill: 0    4. Postoperative pain  -     traMADol (ULTRAM) 50 MG tablet; Take 1-2 tablets by mouth Every 6 (Six) Hours As Needed for Moderate Pain  or Severe Pain .  Dispense: 60 tablet; Refill: 0         Follow Up:   Pending pathology.    MIL Jensen MD  Gynecologic Oncology     Please note that portions of this note may have been completed with a voice recognition program.

## 2021-07-06 NOTE — TELEPHONE ENCOUNTER
Called patient to discuss pathology results.  Previously discussed with patient on Friday after hours the results of her CT scan that demonstrated widespread metastatic disease including chest adenopathy and in her periclitoral mass.  Pathology results were received from [facility].  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  Discussed with the patient that this makes her a stage IVB high-grade endometrial cancer.  We discussed that the best course of action at this point is to get her started on chemotherapy in short order especially given the aggressiveness of this type of cancer particularly in light of her enlarging chest mass.  I briefly reviewed chemotherapy which consists of IV carboplatin and paclitaxel for 8 cycles.  We briefly discussed some of the more common side effects.  Patient understandably upset.  We will get her scheduled for chemotherapy teaching and to see me on Monday 7/12/2021.  She did briefly ask questions about prognosis.  I reported that these are very aggressive and challenging tumors and that she will most likely be on treatment for the duration of her life.  I did asked that we defer discussion of specific prognostic numbers until she sees me in the office.  Patient is amenable to this.  We will get her scheduled for teaching next week with plans to start chemotherapy on 7/15/2021.     Given the rarity of this type of cancer we will plan to present this patient at tumor board on Friday as well as obtain [facility] tumor testing for any targetable mutations.    Electronically signed by Jolie Jensen MD, 07/06/21, 1:27 PM EDT.

## 2021-07-07 NOTE — TELEPHONE ENCOUNTER
----- Message from Shannan Jha sent at 7/7/2021  1:16 PM EDT -----  Regarding: Prescription Question  Contact: [phone]  Freddy Jensen!  Hope you are well!  I think I need something a little stronger as the pain seems to be coming back too quick around my back and hips.  I am taking 2 of the 50mg Tramadol and 1 of the Ibuprofen 800 every 5 hours and 2 of the Gabapentins 3 times per day.  The Tramadol/Ibuprofen seem to wear off alot faster than than 5 hours.  Is there something I can do?  Thanks,  Shannan

## 2021-07-08 NOTE — TELEPHONE ENCOUNTER
From: Shannan Jha  To: Office of Jolie Jensen MD  Sent: 7/8/2021 12:36 PM EDT  Subject: Medication Renewal Request    Refills have been requested for the following medications:     ibuprofen (ADVIL,MOTRIN) 800 MG tablet [Jolie Jensen MD]    Preferred pharmacy: [id number] [place] [address] [address] AT [facility] [address]  - [phone]  - [phone] FX  Delivery method: Pickup

## 2021-07-12 NOTE — PROGRESS NOTES
CONSULTATION NOTE      :                                                          1970  DATE OF CONSULTATION:                       2021   REQUESTING PHYSICIAN:                   Jolie Jensen MD  REASON FOR CONSULTATION:           Endometrial cancer (CMS/[facility])  - FIGO Stage IVB (cN2a, pM1)    Thank you for requesting my services in evaluation of this pleasant individual.  I am seeing them in outpatient consultation regarding a diagnosis of metastatic uterine cancer.     BRIEF HISTORY:  The patient is a very pleasant 50 y.o. female  with a past medical history significant for type 2 diabetes and GERD, who was unfortunately recently diagnosed with a locally advanced and metastatic uterine cancer.  She presented with vaginal bleeding and underwent a hysterectomy on 2021, that unfortunately demonstrated rather advanced, dedifferentiated malignancy.  There is multifocal disease within the pelvis as well as a large clitoral mass.  Postoperatively, she underwent CT scans for staging which unfortunately demonstrated metastatic disease.  She is in the process of preparing initiate systemic chemotherapy on July 15, but she has been experiencing rather significant left-sided pelvic pain and left hip pain, which is attributable to left sidewall disease as well as the clitoris metastasis.  Her pain is poorly controlled with oral pain medications, and I am being asked to see her today for consideration of palliative radiation therapy.    Allergy:   Allergies   Allergen Reactions   • Codeine Nausea Only and Dizziness       Social History:   Social History     Socioeconomic History   • Marital status:      Spouse name: Not on file   • Number of children: Not on file   • Years of education: Not on file   • Highest education level: Not on file   Tobacco Use   • Smoking status: Never Smoker   • Smokeless tobacco: Never Used   Vaping Use   • Vaping Use: Never used   Substance and Sexual Activity   •  Alcohol use: Never   • Drug use: Never   • Sexual activity: Defer       Past Medical History:   Past Medical History:   Diagnosis Date   • Adenocarcinoma (CMS/HCC)     endometrial   • Anxiety    • Depression    • Diabetes (CMS/HCC)    • GERD (gastroesophageal reflux disease)    • Heart murmur    • Hyperlipidemia    • Hypertension    • Thyroid disease    • Uterine cancer (CMS/HCC)        Family History: family history includes Arthritis in her father; Cancer in her father; Diabetes in her brother and mother; Heart attack in her mother; Heart disease in her mother; Stroke in her mother; Thyroid disease in her brother and father.     Surgical History:   Past Surgical History:   Procedure Laterality Date   • ENDOSCOPY     • GALLBLADDER SURGERY     • TOTAL LAPAROSCOPIC HYSTERECTOMY SALPINGO OOPHORECTOMY N/A 6/18/2021    Procedure: TOTAL LAPAROSCOPIC RADICAL TUMOR DEBULKING WITH TOTAL HYSTERECTOMY (UTERUS > 250 g), BILATERAL SALPINGO-OOPHORECTOMY, LEFT URETROLYSIS, PERTIONEAL STRIPPING, LYSIS OF ADHESIONS (>90 MINUTES), CYSTOSCOPY WITH LEFT TEMPORARY STENT PLACEMENT, AND INJECTION FOR SENTINEL LYMPH NODE DISSECTION;  Surgeon: Jolie Jensen MD;  Location: [facility];  Service: Robotics - DaVinci;  Laterality: N/A   • WISDOM TOOTH EXTRACTION          Review of Systems:   Review of Systems   Constitutional: Positive for fatigue.   Cardiovascular: Positive for leg swelling (bilateral LE).   Gastrointestinal: Positive for constipation.   Endocrine: Positive for hot flashes.   Musculoskeletal: Positive for back pain (radiating down left leg).   Psychiatric/Behavioral: The patient is nervous/anxious.    All other systems reviewed and are negative.          Objective   VITAL SIGNS:   Vitals:    07/12/21 0946   BP: 131/68   Pulse: 100   Resp: 18   Temp: 97.5 °F (36.4 °C)   TempSrc: Temporal   SpO2: 94%   Weight: 100 kg (220 lb 12.8 oz)   PainSc:   8   PainLoc: Hip  Comment: left        Karnofsky score: 80      Physical Exam:    Physical Exam  Vitals and nursing note reviewed.   Constitutional:       General: She is not in acute distress.     Appearance: She is well-developed.   HENT:      Head: Normocephalic and atraumatic.   Eyes:      Conjunctiva/sclera: Conjunctivae normal.      Pupils: Pupils are equal, round, and reactive to light.   Cardiovascular:      Rate and Rhythm: Normal rate and regular rhythm.      Heart sounds: No murmur heard.   No friction rub.   Pulmonary:      Effort: Pulmonary effort is normal.      Breath sounds: Normal breath sounds. No wheezing.   Abdominal:      General: Bowel sounds are normal. There is no distension.      Palpations: Abdomen is soft. There is no mass.      Tenderness: There is no abdominal tenderness.      Comments: Multiple well-healed incisions   Musculoskeletal:         General: Normal range of motion.      Cervical back: Normal range of motion and neck supple.   Lymphadenopathy:      Cervical: No cervical adenopathy.   Skin:     General: Skin is warm and dry.   Neurological:      Mental Status: She is alert and oriented to person, place, and time.   Psychiatric:         Behavior: Behavior normal.         Thought Content: Thought content normal.         Judgment: Judgment normal.       IMAGING  I have personally reviewed the relevant imaging studies, as follows:  CT Chest With Contrast Diagnostic    Result Date: 7/2/2021  1. Abnormal CT scan with evidence of diffuse metastatic disease. There are pathologic nodes in the left supraclavicular region, left retropectoral region, mediastinum, janice hepatis, retroperitoneum and pelvis. There are pulmonary parenchymal nodules which are noncalcified in the right middle lobe and both lower lobes concerning for metastasis. There are 3 possibly 4 low-density liver lesions suspicious for metastasis. There is a small indeterminate left adrenal nodule. 2. There is a lobulated soft tissue mass in the left upper pelvis which could represent pathologic  adenopathy versus ovarian mass. Correlate with surgical history as to whether the patient has a left ovary. 3. Small left-sided nodule in the pelvis could represent peritoneal implant. 4. Soft tissue mass at the perineum measuring 4.5 x 4.1 cm and could correspond to the palpable mass at the clitoris. Signer Name: Shara Salter MD  Signed: 7/2/2021 8:13 AM  Workstation Name: [email or web address]  [facility]    CT Abdomen Pelvis With Contrast    Result Date: 7/2/2021  1. Abnormal CT scan with evidence of diffuse metastatic disease. There are pathologic nodes in the left supraclavicular region, left retropectoral region, mediastinum, janice hepatis, retroperitoneum and pelvis. There are pulmonary parenchymal nodules which are noncalcified in the right middle lobe and both lower lobes concerning for metastasis. There are 3 possibly 4 low-density liver lesions suspicious for metastasis. There is a small indeterminate left adrenal nodule. 2. There is a lobulated soft tissue mass in the left upper pelvis which could represent pathologic adenopathy versus ovarian mass. Correlate with surgical history as to whether the patient has a left ovary. 3. Small left-sided nodule in the pelvis could represent peritoneal implant. 4. Soft tissue mass at the perineum measuring 4.5 x 4.1 cm and could correspond to the palpable mass at the clitoris. Signer Name: Shara Salter MD  Signed: 7/2/2021 8:13 AM  Workstation Name: [email or web address]  [facility]        PATHOLOGY  Hysterectomy, Bilateral Salphigo-oophorectomy and Lymph Node Staging 6/18/2021:  1. Right pelvic peritoneum, biopsy -  Fibroadipose tissue with mesothelial hyperplasia, negative for malignancy.  2. Clitoral munoz, biopsy -  Involved by dedifferentiated carcinoma of endometrium.  3. Uterus, cervix, bilateral fallopian tubes and ovaries, hysterectomy and bilateral salpingo-oophorectomy -  Cervix - negative for  malignancy  Endometrium/myometrium - dedifferentiated carcinoma of the endometrium, invading 100% of the myometrial  thickness.  Extensive lymphvascular invasion is identified  Left ovary and fallopian tube involved by dedifferentiated carcinoma  Right fallopian tube and ovary, negative for tumor.  Right periadnexal soft tissue involved by dedifferentiated carcinoma..  4. Abdomin, lower colonic adhesion, biopsy -  Fibroadipose tissue with mesothelial hyperplasia, negative for malignancy.  5. Endometrium, tumor fragment, biopsy -  Involved by dedifferentiated carcinoma of endometrium with extensive necrosis.  This diagnosis was rendered by Dr. Tilley at [facility]. See scanned report for    The following portions of the patient's history were reviewed and updated as appropriate: allergies, current medications, past family history, past medical history, past social history, past surgical history and problem list.    Assessment:   Assessment  Mrs. Jha is a 50 year old female with a new diagnosis of a stage IVb de-differentiated uterine cancer.  She will require systemic chemotherapy, which is scheduled to begin later this week.  She does though have advanced disease in the pelvis on the left sidewall as well as a bulky clitoral lesion causing pain.  I do think she would benefit from a short course of palliative radiation therapy, and I have recommended that she undergo a dose of 30 Gray in 10 fractions using 3-dimensional techniques.  After a full explanation of the risks and benefits, she was agreeable and signed informed consent.  I coordinated for her to undergo radiation set up and simulation session today, and we will aim to begin her treatments in the next 1 to 2 days pending insurance authorization.    RECOMMENDATIONS:   We will treat the left pelvic sidewall, perirectal, and clitoral masses to 30 Gray in 10 fractions using 3-dimensional techniques    Follow Up:   Return in about 2 days (around  7/14/2021) for To Begin Radiation Treatment.  Diagnoses and all orders for this visit:    1. Endometrial cancer (CMS/HCC)    Thank you for allowing me to participate in the care of this individual.    Sincerely,       Axel Koehler MD

## 2021-07-12 NOTE — PROGRESS NOTES
CHEMOTHERAPY PREPARATION    Shannan Jha  [id number]  1970    Subjective   Chief Complaint: Treatment Preparation and Needs Assessment    History of present illness:  Shannan Jha is a 50 y.o. year old female who is here today for chemotherapy preparation and needs assessment. The patient has been diagnosed with stage IVB dedifferentiated endometrioid adenocarcinoma and is scheduled to begin treatment with carboplatin/paclitaxel.     Oncology History:    Oncology/Hematology History   Endometrial cancer (CMS/HCC)   6/3/2021 Initial Diagnosis    Referred by Dr. Nancy Pearce    5/20/2021: TVUS with uterus measuring 3.4 x 6.8 x 6.4 cm with an endometrial stripe thickness of 3.2 mm.  Left and right ovaries normal.  5/20/2021: Biopsy of cervical polyp demonstrates carcinoma most consistent with endometrial adenocarcinoma     6/18/2021 Surgery    Robotic assisted radical tumor debulking with total laparoscopic hysterectomy for uterus weighing greater than 250 g, bilateral salpingo-oophorectomy, left ureterolysis, peritoneal stripping, lysis of adhesions, cystoscopy with left temporary stent placement, and biopsy of clitoral mass.    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  Right fallopian tube and ovary negative.  Extensive LVSI noted.  Clitoral munoz mass with dedifferentiated carcinoma myometrium.    [facility]       7/2/2021 Imaging    Stat CT scans ordered for enlarging chest wall mass noted at problem visit.    CT C/A/P with diffuse metastatic disease.  Parenchymal liver lesions suspicious for metastases noted.  Adenopathy noted in the left supraclavicular, left retropectoral, mediastinum, janice hepatis, retroperitoneum and pelvis.  Pulmonary nodules concerning for metastasis.  Lobulated soft tissue mass in the left upper pelvis most  likely lymph node.  Soft tissue mass at peritoneum measuring 4.5 x 4.1 cm corresponding to the palpable clitoral mass.     7/2/2021 Cancer Staged    Staging form: Corpus Uteri - Carcinoma And Carcinosarcoma, AJCC 8th Edition  - Clinical stage from 7/2/2021: FIGO Stage IVB (cN2a, pM1) - Signed by Jolie Jensen MD on 7/6/2021     7/15/2021 -  Chemotherapy    OP UTERINE PACLitaxel / CARBOplatin (Q21D)         The current medication list and allergy list were reviewed and reconciled.     Past Medical History, Past Surgical History, Social History, Family History have been reviewed and are without significant changes except as mentioned.    Review of Systems   Constitutional: Positive for activity change, appetite change and fatigue. Negative for chills and fever.   Respiratory: Negative for cough, shortness of breath and wheezing.    Cardiovascular: Negative for chest pain, palpitations and leg swelling.   Gastrointestinal: Positive for abdominal pain. Negative for constipation, diarrhea, nausea and vomiting.   Genitourinary: Positive for pelvic pain. Negative for dysuria, urgency, vaginal bleeding, vaginal discharge and vaginal pain.   Musculoskeletal: Positive for arthralgias and back pain.   Neurological: Negative for dizziness and light-headedness.   Psychiatric/Behavioral: Positive for dysphoric mood.       Objective   Physical Exam  Vitals and nursing note reviewed.   Constitutional:       General: She is not in acute distress.     Appearance: Normal appearance.   HENT:      Head: Normocephalic and atraumatic.      Right Ear: External ear normal.      Left Ear: External ear normal.      Nose: Nose normal.   Eyes:      General: No scleral icterus.        Right eye: No discharge.         Left eye: No discharge.      Conjunctiva/sclera: Conjunctivae normal.   Cardiovascular:      Rate and Rhythm: Normal rate.   Pulmonary:      Effort: Pulmonary effort is normal. No respiratory distress.   Neurological:       "General: No focal deficit present.      Mental Status: She is alert and oriented to person, place, and time.   Psychiatric:         Mood and Affect: Mood normal.         Behavior: Behavior normal.         Thought Content: Thought content normal.         Judgment: Judgment normal.       Vital Signs: /68   Pulse 98   Temp 97.5 °F (36.4 °C) (Temporal)   Resp 16   Ht 175.3 cm (69.02\")   LMP 06/03/2021 Comment: mammogram is not up to date  BMI 33.80 kg/m²   Vitals:    07/12/21 0840   PainSc:   8           General Appearance:  alert, cooperative, no apparent distress and appears stated age   Neurologic/Psychiatric: A&O x 3, gait steady, appropriate affect   HEENT:  Normocephalic, without obvious abnormality, mucous membranes moist   Lungs:   Clear to auscultation bilaterally; respirations regular, even, and unlabored bilaterally   Heart:  Regular rate and rhythm, no murmurs appreciated   Extremities: Normal, atraumatic; no clubbing, cyanosis, or edema    Skin: No rashes, lesions, or abnormal coloration noted     ECOG Performance Status: 2 - Symptomatic, <50% confined to bed            NEEDS ASSESSMENTS    Genetics  The patient's new diagnosis and family history have been reviewed for genetic counseling needs. A genetic referral is not recommended.     Molecular Testing  Further molecular testing on tumor is indicated. This has already been ordered    Psychosocial  The patient has completed a PHQ-9 Depression Screening and the Distress Thermometer (DT) today.   PHQ-9 Total Score: 20. PHQ-9 results show 20-27 (Severe Depression).   Problems marked by the patient as being an issue for them within the last week include emotional problems and physical problems.   Results were reviewed along with psychosocial resources offered by our cancer center. Our oncology social worker will be flagged for a DT score of 4 or above, and a same day call will be made for a score of 9 or 10. A mental health referral is recommended " "at this time. The patient is accepting of a referral to [facility].   Copies of patient's questionnaires will be scanned into EMR for details and further reference.    Barriers to care  A barriers form was also completed by the patient today. We discussed services offered by our facility to help her have adequate access to care. The patient was given the name and card for our Oncology Social Worker, Karen Rahman. Based upon barriers assessment today, the patient will require a follow-up call from the  to further discuss needs.   A copy of the barriers form will also be scanned into EMR for details and further reference.     VAD Assessment  The patient and I discussed planned intervenous chemotherapy as well as other IV treatments that are often needed throughout the course of treatment. These may include, but are not limited to blood transfusions, antibiotics, and IV hydration. The vasculature does appear to be adequate for multiple peripheral IVs throughout their treatment course. Discussed risks and benefits of VADs. The patient would not like to pursue Port-A-Cath insertion prior to initiation of treatment.     Advance Care Planning   ACP discussion was held with the patient during this visit. Patient does not have an advance directive, information provided.  The patient and I discussed advanced care planning, \"Conversations that Matter\".   This service was offered, free of charge, for development of advance directives with a certified ACP facilitator.  The patient does not have an up-to-date advanced directive. This document is not on file with our office. The patient is not interested in an appointment with one of our facilitators to create or update their advanced directives.         Palliative Care  The patient and I discussed palliative care services. Palliative care is not the same as Hospice care. This is specialized medical care for people living with serious illness with the goal " of improving quality of life for the patient and their family. [facility] has partnered with [facility] to offer our patients outpatient palliative care early along with their treatment to assist in coordination of care, symptom management, pain management, and medical decision making.  Oncology criteria for palliative care referral is met at this time. The patient is interested in a palliative care consultation.     Additional Referral needs  Referral to radiation oncology      CHEMOTHERAPY EDUCATION     Chemotherapy/Biotherapy Education Sheets: (list all that apply)  nausea management, acid reflux management, diarrhea management, Cancer resourse contacts information, skin and mouth care, vaccination information, wig information and Treatment Calendar                                                                                                                                                                 Chemotherapy Regimen:   Treatment Plans     Name Type Plan Dates Plan Provider         Active    OP UTERINE PACLitaxel / CARBOplatin (Q21D) ONCOLOGY TREATMENT  7/14/2021 - Present Jolie Jensen MD                     Assessment and Plan:    Diagnoses and all orders for this visit:    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 Endometrial cancer (CMS/HCC)  -     Prosthetic Cranial  -     Ambulatory Referral to ONC Social Work  -     loratadine (Claritin) 10 MG tablet; Take 1 tab the night before chemo then take 1 tab the morning of chemo.  Dispense: 30 tablet; Refill: 0  -     ondansetron (ZOFRAN) 8 MG tablet; Take 1 tablet by mouth 3 (Three) Times a Day As Needed for Nausea or Vomiting.  Dispense: 30 tablet; Refill: 5  -     prochlorperazine (COMPAZINE) 10 MG tablet; Take 1 tablet by mouth Every 4 (Four) Hours As Needed for Nausea or Vomiting.  Dispense: 30 tablet; Refill: 5  -     dexamethasone (DECADRON) 4 MG tablet; Take 5 tabs the night before chemo then take  2 tabs in the morning on days 2, 3 & 4.  Take with food.  Dispense: 11 tablet; Refill: 5          I spent 40 minutes caring for Shannan on this date of service. This time includes time spent by me in the following activities: preparing for the visit, reviewing tests, obtaining and/or reviewing a separately obtained history, counseling and educating the patient/family/caregiver, ordering medications, tests, or procedures and documenting information in the medical record.     The patient and I have reviewed their new cancer diagnosis and scheduled treatment plan. Needs assessment was completed including genetics, psychosocial needs, barriers to care, VAD evaluation, advanced care planning, and palliative care services. Referrals have been ordered as appropriate based upon our evaluation and patient desires.     Chemotherapy teaching was also completed today as documented above. Adequate time was given to answer all questions to her satisfaction. Patient and family are aware of their care team members and contact information if they have questions or problems throughout the treatment course. Needs assessments and education has been completed. The patient is adequately prepared to begin treatment as scheduled.     Cancer-related pain: Patient reports pain is 8 out of 10.  Currently on oxycodone.  Refill provided.  Patient reports that oxycodone is working but not lasting long enough.  Recommend patient consider taking every 3 hours as needed instead of every 4.  Referral to pain palliative was placed as I think the patient would benefit from their assistance management most likely will require long-term narcotic.  Additionally a consultation to radiation oncology was placed as Dr. Koehler reviewed images during tumor board on Friday and thought that radiation therapy may be helpful in assisting with the patient's pain.    Pain assessment was performed today as a part of patient’s care. For patients with pain related to  surgery, gynecologic malignancy or cancer treatment, the plan is as noted in the assessment/plan.  For patients with pain not related to these issues, they are to seek any further needed care from a more appropriate provider, such as PCP.      Electronically signed by Jolie Jensen MD on 07/12/21 at 08:57 EDT.

## 2021-07-12 NOTE — PLAN OF CARE
Outpatient Infusion • [address] [address] • [address] • [place] [id number] • [phone]      CHEMOTHERAPY EDUCATION SHEET    NAME:  Shannan Jha      : 1970           DATE: 21    Booklets Given: Chemotherapy and You []  Eating Hints []    Sexuality/Fertility Books []     Chemotherapy/Biotherapy Education Sheets: [employer] Education Sheet for PACLitaxel / CARBOplatin      Chemotherapy Regimen:  (PACLitaxel / CARBOplatin IV)  D1 of 21 Days Cycle     TOPICS EDUCATION PROVIDED EDUCATION REINFORCED COMMENTS   ANEMIA:  role of RBC, cause, s/s, ways to manage, role of transfusion [x] [] Patient advised on Hgb role in the body as well as the fatigue experienced when its low. Encouraged to follow lab schedule and to get 7-8 hours of sleep per night as well as to drink plenty of water.     THROMBOCYTOPENIA:  role of platelet, cause, s/s, ways to prevent bleeding, things to avoid, when to seek help [x] [] Pt advised that plts may be decreased while on this regimen. Patient informed they may bruise and bleed a bit easier while on chemo.     NEUTROPENIA:  role of WBC, cause, infection precautions, s/s of infection, when to call MD [x] [] Pt advised that they will be at an increased risk of infection while on this regimen. Pt advised to wash hand regularly and to avoid larger crowds.    NUTRITION & APPETITE CHANGES:  importance of maintaining healthy diet & weight, ways to manage to improve intake, dietary consult, exercise regimen [x] [] Patient advised they may have a decreased appetitive while on this regimen. The patient will discuss this further with the nutrition specialist during their visit.                           DIARRHEA:  causes, s/s of dehydration, ways to manage, dietary changes, when to call MD [x] [] Pt advised to utilize imodium for 24 hours if diarrhea develops while on this regimen. If diarrhea persists unrelieved, then the patient should call the clinic for further evaluation.      CONSTIPATION:  causes, ways to manage, dietary changes, when to call MD [] []    NAUSEA & VOMITING:  cause, use of antiemetics, dietary changes, when to call MD [x] [] Patient advised this regimen may cause n/v. Patient informed of the role of PRN zofran for relief. Pt informed to call clinic if zofran does not provide relief.     MOUTH SORES:  causes, oral care, ways to manage [x] [] Pt informed of the possibility of mouth sore. Recommended salt and soda as prevention and initial treatment. If sore develop and pain unrelieved by salt and soda, pt advised to call clinic for further evaluation   ALOPECIA:  cause, ways to manage, resources [x] [] Pt made aware of the risk of this regimen to cause hair loss.    INFERTILITY & SEXUALITY:  causes, fertility preservation options, sexuality changes, ways to manage, importance of birth control [x] [] Patient made aware that bodily fluids may contain chemotherapy while on treatment. Patient advised to use a barrier method if any sexual contact is initiated. Patient informed of precautions to take when cleaning and disposing of soiled materials   NERVOUS SYSTEM CHANGES:  causes, s/s, neuropathies, cognitive changes, ways to manage [x] [] Pt made aware of   the possibility of numbness and potential nerve related pain in the hand and feet. Informed to call clinic if this was to worsen or become terribly bothersome to them.     PAIN:  causes, ways to manage [] [] ????   SKIN & NAIL CHANGES:  cause, s/s, ways to manage [] []    ORGAN TOXICITIES:  cause, s/s, need for diagnostic tests, labs, when to notify MD [] []    SURVIVORSHIP:  distress, distress assessment, secondary malignancies, early/late effects, follow-up, social issues, social support [] []    HOME CARE:  use of spill kits, storing of PO chemo, how to manage bodily fluids [x] [] Patient made aware that bodily fluids may contain hazardous residuals while on treatment. Patient informed of precautions to take when  cleaning and disposing of soiled materials   MISCELLANEOUS:  drug interactions, administration, vesicant, et [x] [] Patient advised this regimen may cause  infusion related reactions. Patient advised to inform the nurse if he experiences any changes in his person during the infusion       Notes:  Discussed the regimen (PACLitaxel / CARBOplatin)  with the patient while in clinic. All the patients questions and concerns were answered. Provided patient pharmacy contact information as well as information sheets for the above regimen. Patient was consented for therapy and provided with a personalized treatment calender of tentative treatment cycles.      Thank You,  Garret Montano  Pharmacy Resident  7/12/2021  08:41 EDT

## 2021-07-14 NOTE — PROGRESS NOTES
Chemotherapy Treatment Office Visit      Patient Name: Shannan Jha  : 1970   MRN: [id number]     Chief Complaint:  Uterine cancer, chemotherapy    History of Present Illness: Shannan Jha is a 50 y.o. female who presents today for her first cycle of chemotherapy.  She is currently doing okay.  Reports that her pain is overall stable.  Has doubled up on her oxycodone from 5 mg to 10 mg tablets and believes that this is helping.  Is taking gabapentin 900 mg 3 times daily.  She reports pain is somewhat manageable.  Refill of ibuprofen today.  She is sleeping a little bit better with the Remeron reports that her hot flashes are mildly improved.  Her appetite remains diminished.  She is ambulating but is limited due to pain.  Denies any vaginal bleeding or vaginal discharge.  Teaspoon of her chest wall mass.    Oncologic History:  Oncology/Hematology History   Endometrial cancer (CMS/HCC)   6/3/2021 Initial Diagnosis    Referred by Dr. Nancy Pearce    2021: TVUS with uterus measuring 3.4 x 6.8 x 6.4 cm with an endometrial stripe thickness of 3.2 mm.  Left and right ovaries normal.  2021: Biopsy of cervical polyp demonstrates carcinoma most consistent with endometrial adenocarcinoma     2021 Surgery    Robotic assisted radical tumor debulking with total laparoscopic hysterectomy for uterus weighing greater than 250 g, bilateral salpingo-oophorectomy, left ureterolysis, peritoneal stripping, lysis of adhesions, cystoscopy with left temporary stent placement, and biopsy of clitoral mass.    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  Right fallopian tube and ovary negative.  Extensive LVSI noted.  Clitoral munoz mass with dedifferentiated carcinoma myometrium.    Surgery at [facility] by Jolie Jensen MD       2021 Imaging    Stat CT scans ordered  for enlarging chest wall mass noted at problem visit.    CT C/A/P with diffuse metastatic disease.  Parenchymal liver lesions suspicious for metastases noted.  Adenopathy noted in the left supraclavicular, left retropectoral, mediastinum, janice hepatis, retroperitoneum and pelvis.  Pulmonary nodules concerning for metastasis.  Lobulated soft tissue mass in the left upper pelvis most likely lymph node.  Soft tissue mass at peritoneum measuring 4.5 x 4.1 cm corresponding to the palpable clitoral mass.     7/2/2021 Cancer Staged    Staging form: Corpus Uteri - Carcinoma And Carcinosarcoma, AJCC 8th Edition  - Clinical stage from 7/2/2021: FIGO Stage IVB (cN2a, pM1) - Signed by Jolie Jensen MD on 7/6/2021     7/15/2021 -  Chemotherapy    OP UTERINE PACLitaxel / CARBOplatin (Q21D)        No components found for: .3     I have reviewed and the following portions of the patient's history were updated as appropriate: past family history, past medical history, past social history, past surgical history, past obstetrics/gynecologic history and problem list.    Medications: The current medication list was reviewed with the patient and updated in the EMR this date per the Medical Assistant. Medication dosages and frequencies were confirmed to be accurate.      Allergies:   Allergies   Allergen Reactions   • Codeine Nausea Only and Dizziness       Subjective    Review of Systems:   Review of Systems   Constitutional: Positive for activity change, appetite change and fatigue. Negative for chills and fever.   Respiratory: Negative for cough, shortness of breath and wheezing.    Cardiovascular: Negative for chest pain, palpitations and leg swelling.   Gastrointestinal: Positive for abdominal pain and nausea. Negative for constipation, diarrhea and vomiting.   Genitourinary: Negative for dysuria, pelvic pain, urgency, vaginal bleeding, vaginal discharge and vaginal pain.   Musculoskeletal: Positive for arthralgias, back  "pain and myalgias.   Neurological: Negative for dizziness and light-headedness.          Objective     Physical Exam:  Vital Signs:   Vitals:    07/15/21 0856   BP: 121/68   Pulse: 110   Resp: 18   Temp: 97.8 °F (36.6 °C)   TempSrc: Temporal   SpO2: 97%   Weight: 100 kg (221 lb 4.8 oz)   Height: 175.3 cm (69.02\")   PainSc:   7     BMI: Body mass index is 32.67 kg/m².   ECOG PS: 1  Karnofsky score: 80    PHQ-2 Depression Screening  Little interest or pleasure in doing things?     Feeling down, depressed, or hopeless?     PHQ-2 Total Score         Physical Exam  Vitals and nursing note reviewed. Exam conducted with a chaperone present.   Constitutional:       General: She is not in acute distress.     Appearance: Normal appearance. She is well-developed. She is not diaphoretic.   HENT:      Head: Normocephalic and atraumatic.      Right Ear: External ear normal.      Left Ear: External ear normal.      Nose: Nose normal.   Eyes:      General: No scleral icterus.        Right eye: No discharge.         Left eye: No discharge.      Conjunctiva/sclera: Conjunctivae normal.   Neck:      Thyroid: No thyromegaly.   Cardiovascular:      Rate and Rhythm: Normal rate.   Pulmonary:      Effort: Pulmonary effort is normal. No respiratory distress.   Abdominal:      General: There is no distension.      Palpations: Abdomen is soft. There is no mass.      Tenderness: There is no abdominal tenderness. There is no guarding.   Genitourinary:     Comments: External genitalia normal. Vagina with clot at top of cuff. Vaginal apex palpates intact. Clitoral mass approximately 50% larger than noted intraoperatively. Uterus, cervix and adnexa surgically absent.  Musculoskeletal:         General: No swelling, tenderness, deformity or signs of injury.      Cervical back: Neck supple.      Right lower leg: No edema.      Left lower leg: No edema.   Lymphadenopathy:      Cervical: Cervical adenopathy (Large 3x3 cm mass just inferior to left " collarbone that is concerning for a lymph node) present.   Skin:     General: Skin is warm and dry.      Coloration: Skin is not jaundiced.      Findings: No erythema, lesion or rash.   Neurological:      General: No focal deficit present.      Mental Status: She is alert and oriented to person, place, and time. Mental status is at baseline.      Motor: No abnormal muscle tone.   Psychiatric:         Behavior: Behavior normal.         Thought Content: Thought content normal.         Assessment / Plan    Shannan Jha is a 50 y.o. year old female with stage IVB (widely metastatic) dedifferentiated uterine cancer and presents for cycle 1 of carboplatin (AUC 6)/paclitaxel (175 mg/m2). She has already undergone chemotherapy teaching. She does not have any questions today. Labs were ordered and are appropriate for therapy. She is cleared for today's treatment. Given her stage, unique histology and aggressive disease Caris testing was sent and is pending.     Cancer-related pain: Patient reports pain is 8 out of 10.  Currently on oxycodone. Now taking oxycodone 10 mg Q3H with relief.  Patient referred to pain and palliative. She was also seen by Dr. Koehler of radiation oncology who is planning for palliative radiation starting on 7/19. Form for handicap placard filled out today.    Anxiety/depression/insomnia: Referred to Jolly Kuhn for assistance in management. Started on Remeron due to insomnia and low appetite.She will continue her buspar and paxil.    Menopausal hot flushes: Slightly improved with gabapentin. Could consider adding clonidine in the future.  Would like to avoid HRT until Caris testing returns to ensure that there is no hormone receptor positivity on this tumor.    Bilateral lower extremity swelling: Likely related to pelvic disease burden.  Hopefully this improves with chemotherapy and radiation.  Discussed with DVT precautions.     Pain assessment was performed today as a part of patient’s care.  For  patients with pain related to surgery, gynecologic malignancy or cancer treatment, the plan is as noted in the assessment/plan.  For patients with pain not related to these issues, they are to seek any further needed care from a more appropriate provider, such as PCP.      Diagnoses and all orders for this visit:    1. Endometrial cancer (CMS/HCC) (Primary)  -     ; Future  -     CBC and Differential; Future  -     Comprehensive metabolic panel; Future  -     Lab Appointment Request; Future  -     Clinic Appointment Request; Future  -     Infusion Appointment Request 7; Future    2. Cancer-related pain    3. Depression with anxiety    4. Adjustment insomnia    Other orders  -     sodium chloride 0.9 % infusion 250 mL  -     dexamethasone (DECADRON) 20 mg in sodium chloride 0.9 % IVPB  -     famotidine (PEPCID) injection 20 mg  -     diphenhydrAMINE (BENADRYL) 50 mg in sodium chloride 0.9 % 50 mL IVPB  -     palonosetron (ALOXI) injection 0.25 mg  -     fosaprepitant (EMEND) 150 mg in sodium chloride 0.9 % 100 mL IVPB  -     PACLitaxel (TAXOL) 385 mg in sodium chloride 0.9 % 564.2 mL chemo IVPB  -     CARBOplatin (PARAPLATIN) 900 mg in sodium chloride 0.9 % 340 mL chemo IVPB  -     hydrocortisone sodium succinate (Solu-CORTEF) injection 100 mg  -     diphenhydrAMINE (BENADRYL) injection 50 mg  -     famotidine (PEPCID) injection 20 mg         Follow Up: In 3 weeks for infusion.    MIL Jensen MD  Gynecologic Oncology     Please note that portions of this note may have been completed with a voice recognition program.

## 2021-07-15 PROBLEM — E11.9 TYPE 2 DIABETES MELLITUS (HCC): Status: ACTIVE | Noted: 2021-01-01

## 2021-07-15 NOTE — TELEPHONE ENCOUNTER
Infusion RN called to say that pt's BGL was now 386.    Dr. Jensen ordered 10 more units of insulin.    RN placed order in plan and notified infusion RN.

## 2021-07-15 NOTE — PROGRESS NOTES
"Outpatient Oncology Nutrition     Reason for Visit:     Oncology Nutrition Screening and Patient Education / Met with patient during her initial chemotherapy infusion appointment.     Patient Name:  Shannan Jha    :  1970    MRN:  [id number]    Date of Encounter: 07/15/2021    Nutrition Assessment     Diagnosis: stage IVB dedifferentiated uterine cancer     Type of Cancer Treatment:     Surgery: robotic assisted radical tumor debulking with total laparoscopic hysterectomy for uterus weighing greater than 250 g, bilateral salpingo-oophorectomy, left ureterolysis, peritoneal stripping, lysis of adhesions, cystoscopy with left temporary stent placement, and biopsy of clitoral mass on 2021 for endometrial cancer     Chemotherapy: Carbo / Taxol - every 21 days x 8 cycles      Radiation: left pelvic sidewall, perirectal, and clitoral masses to 30 Gray in 10 fractions using 3-dimensional techniques (start date 21)    Patient Active Problem List:    Patient Active Problem List   Diagnosis   • Endometrial cancer (CMS/HCC)       Food / Nutrition Related History   Patient reports her appetite / oral intake has been decreased since surgery.  She complains of taste changes.  Discussed her history of insulin dependent diabetes.  She reports checking her blood sugars 3 times daily at home and that her blood sugars have been sporadic lately.  Provided and reviewed written diet material \"Blood Glucose Management\", reviewed diabetes diet basics and the effects of steroids on blood sugars.  Encouraged her to continue checking her blood sugars regularly and advised her to call her Endocrinologist for possible medication changes.    Hydration Status   Discussed the importance of hydration and reviewed hydrating fluids.    Goal: ~96 ounces daily     How many 8 ounces glasses of water do you consume per day? Patient reports she mostly drinks water throughout the day but does not keep track of how much she " "drinks.    Enteral Feeding       Anthropometric Measurements     Height:    Ht Readings from Last 1 Encounters:   07/15/21 175.3 cm (69.02\")       Weight:    Wt Readings from Last 1 Encounters:   07/15/21 100 kg (221 lb 4.8 oz)       BMI:  32.7 - Obese    Weight Change: ~17# (7%) weight loss x 4-6 weeks    Review of Lab Data (Time Frame - 1 month / 2 month)   Labs reviewed 7/15/21 - hyperglycemia, hyponatremia, decreased albumin and increased Alk noted     Medication Review   MAR reviewed     Nutrition Focused Physical Findings       Nutrition Impact Symptoms   Altered appetite  Altered taste perception   Constipation     Physical Activity   Not my normal self, but able to be up and about with fairly normal activities    Current Nutritional Intake     Oral diet:  Diabetic     Intake: oral intake has been less than normal     Malnutrition Risk Assessment     Recent weight loss over the past 6 months:  Yes    How much weight loss:  2 = 14-23 lbs    Eating poorly because of a decreased appetite:  1 = Yes    Malnutrition Screening Score:     MST = 2 more Patient at risk for malnutrition     Nutrition Diagnosis     Problem Unintentional weight loss    Etiology New diagnosis metastatic uterine cancer / clinical condition / decreased appetite / nutrition impact symptoms    Signs / Symptoms 17# (7%) weight loss x 4-6 weeks     Nutrition Intervention   Discussed the importance of good nutrition during her treatment course focusing on adequate calorie, protein, nutrient and fluid intake.  Advised her to be consuming smaller more frequent meals/snacks throughout the day to aid with oral intake and potential nausea management.  Emphasized the importance of protein and its role in the diet; reviewed high protein foods; and recommended she have a protein source at each meal/snack.  Offered several snack ideas she may find more appealing at this time.  Discussed different types of ONS and their role in the diet.  Provided coupons " "for Boost and Ensure products and advised her to purchase diabetic formulas.  Also discussed tips for constipation management including adequate hydration and increasing intake of foods high in insoluble fiber.  Advised her to use the baking soda / salt water mouth rinse before eating to aid with taste changes.  Provided and reviewed written diet materials \"Taste and Smell Changes\" and \"Fiber for Bowel Management\" to reinforce information discussed.    Goal   To improve oral intake to aid with weight maintenance.  To aid with nutrition impact symptom management.    Monitoring / Evaluation   Answered her questions and she voiced understanding of information discussed.  RD's contact information provided and encouraged to call with questions.  Will monitor as needed during her treatment course.    Yessy Adan MS, RD, LD   "

## 2021-07-15 NOTE — TELEPHONE ENCOUNTER
[facility] RN called stating pt's hgb was 8 but that she was not symptomatic.      RN informed Dr. Jensen who stated she didn't want to transfuse unless pt was symptomatic and to have pt call if she developed symptoms.    RN called [facility] and asked her to relay above info to pt which she stated she would.

## 2021-07-15 NOTE — PROGRESS NOTES
Up on discharge Pt was instructed to monitor Blood glucose at home and to follow up with Endocrinologist to adjust insulin due to Chemo/steriod medications.

## 2021-07-15 NOTE — TELEPHONE ENCOUNTER
Infusion RN called stating pt's BGL was 485.  RN obtained verbal order from Dr. Jensen for 20 units of insulin.  Was placed into the treatment plan.     Infusion RN also stated pt has been constipated x4 days.  Pt stated she has been taking colace and milk of mag with no results.  RN advised mag citrate, ducolax suppositories and enema if needed.  Also advised to increase fluids.  Infusion RN stated she would pass that along to pt.

## 2021-07-19 NOTE — TELEPHONE ENCOUNTER
Rx for GI cocktail called into pharmacy  Viscous lidocaine 2% and Maalax 50/50 ratio.  Sig: Take 15-30 cc PO QID PRN  Disp: 500 cc  Refill 11

## 2021-07-29 NOTE — PROGRESS NOTES
ONC Nutrition    Diagnosis: Stage IVB dedifferentiated uterine cancer      Surgery: Robotic assisted radical tumor debulking with total laparoscopic hysterectomy for uterus weighing greater than 250 g, bilateral salpingo-oophorectomy, left ureterolysis, peritoneal stripping, lysis of adhesions, cystoscopy with left temporary stent placement, and biopsy of clitoral mass on 6/18/2021 for endometrial cancer  Chemotherapy: Carbo / Taxol - every 21 days x 8 cycles / Cycle # 1 completed  Radiation: Left pelvic sidewall, perirectal, and clitoral masses to 30 Gray in 10 fractions using 3-dimensional techniques - completes radiation 8/2/21    Weight 203.6 lbs / 7 lbs weight loss over the past week    Patient states that she has been unable to eat / no appetite; blood sugars extremely elevated related to chemo.  Dosing adjustments have been made in insulin for management of hyperglycemia. Discussed food choices that patient may find more appealing and may help with stimulating the appetite.  Encouraged small frequent meals and snacks with inclusion of protein at each, which will also help with blood sugars.  Constipation has been an issue related to pain medication; reviewed nutritional tips for management.  Stressed importance of hydration.  Will follow as indicated.

## 2021-08-04 NOTE — TELEPHONE ENCOUNTER
I was notified by the therapist that pt has missed last 2 treatments.  Called pt and she states she feels like radiation is not helping and she is getting worse.  I explained it is a cumulative effect and radiation should make her pain better but it is not immediate.  She states she is not interested in coming in for the last 2 treatments but she will talk to Dr. Koehler.  I explained he was out until next week.  She states she wants to wait and talk to him then.

## 2021-08-04 NOTE — PROGRESS NOTES
Chemotherapy Treatment Office Visit      Patient Name: Shannan Jha  : 1970   MRN: [id number]     Chief Complaint:  Uterine cancer, chemotherapy    History of Present Illness: Shannan Jha is a 51 y.o. female who presents today for her second cycle of chemotherapy.  Her major continued complaint today is pain.  She is taking the ibuprofen, gabapentin and oxycodone 10 mg as prescribed.  She continues have a painful.  10.  Otherwise she also continues to have trouble sleeping and decreased appetite.  She denies any problems with her bowels or bladder.  Denies any numbness or tingling in her hands or feet at this time.  Of note she did start radiation early as she felt this was worsening her pain.  She does plan to follow-up with Dr. Garcia after he gets back.      Oncologic History:  Oncology/Hematology History   Endometrial cancer (CMS/HCC)   6/3/2021 Initial Diagnosis    Referred by Dr. Nancy Pearce    2021: TVUS with uterus measuring 3.4 x 6.8 x 6.4 cm with an endometrial stripe thickness of 3.2 mm.  Left and right ovaries normal.  2021: Biopsy of cervical polyp demonstrates carcinoma most consistent with endometrial adenocarcinoma     2021 Surgery    Robotic assisted radical tumor debulking with total laparoscopic hysterectomy for uterus weighing greater than 250 g, bilateral salpingo-oophorectomy, left ureterolysis, peritoneal stripping, lysis of adhesions, cystoscopy with left temporary stent placement, and biopsy of clitoral mass.    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  Right fallopian tube and ovary negative.  Extensive LVSI noted.  Clitoral munoz mass with dedifferentiated carcinoma myometrium.    Surgery at [facility] by Jolie Jensen MD       2021 Imaging    Stat CT scans ordered for enlarging chest wall mass noted at problem  visit.    CT C/A/P with diffuse metastatic disease.  Parenchymal liver lesions suspicious for metastases noted.  Adenopathy noted in the left supraclavicular, left retropectoral, mediastinum, janice hepatis, retroperitoneum and pelvis.  Pulmonary nodules concerning for metastasis.  Lobulated soft tissue mass in the left upper pelvis most likely lymph node.  Soft tissue mass at peritoneum measuring 4.5 x 4.1 cm corresponding to the palpable clitoral mass.     7/2/2021 Cancer Staged    Staging form: Corpus Uteri - Carcinoma And Carcinosarcoma, AJCC 8th Edition  - Clinical stage from 7/2/2021: FIGO Stage IVB (cN2a, pM1) - Signed by Jolie Jensen MD on 7/6/2021     7/15/2021 -  Chemotherapy    OP UTERINE PACLitaxel / CARBOplatin (Q21D)        No components found for: .3     I have reviewed and the following portions of the patient's history were updated as appropriate: past family history, past medical history, past social history, past surgical history, past obstetrics/gynecologic history and problem list.    Medications: The current medication list was reviewed with the patient and updated in the EMR this date per the Medical Assistant. Medication dosages and frequencies were confirmed to be accurate.      Allergies:   Allergies   Allergen Reactions   • Codeine Nausea Only and Dizziness       Subjective    Review of Systems:   Review of Systems   Constitutional: Positive for activity change and fatigue. Negative for chills and fever.   Respiratory: Negative for cough, shortness of breath and wheezing.    Cardiovascular: Negative for chest pain, palpitations and leg swelling.   Gastrointestinal: Positive for abdominal pain and nausea. Negative for constipation, diarrhea and vomiting.   Genitourinary: Negative for dysuria, pelvic pain, urgency, vaginal bleeding, vaginal discharge and vaginal pain.   Musculoskeletal: Positive for arthralgias, back pain and myalgias.   Neurological: Negative for dizziness and  "light-headedness.          Objective     Physical Exam:  Vital Signs:   Vitals:    08/05/21 0900   BP: 119/66   Pulse: 105   Resp: 18   Temp: 97.7 °F (36.5 °C)   TempSrc: Temporal   SpO2: 97%   Weight: 91.9 kg (202 lb 9.6 oz)   Height: 175.3 cm (69.02\")   PainSc:   9     BMI: Body mass index is 29.9 kg/m².   ECOG PS: 1  Karnofsky score: 80    PHQ-2 Depression Screening  Little interest or pleasure in doing things?     Feeling down, depressed, or hopeless?     PHQ-2 Total Score         Physical Exam  Vitals and nursing note reviewed. Exam conducted with a chaperone present.   Constitutional:       General: She is not in acute distress.     Appearance: Normal appearance. She is well-developed. She is not diaphoretic.      Comments: Pale   HENT:      Head: Normocephalic and atraumatic.      Right Ear: External ear normal.      Left Ear: External ear normal.      Nose: Nose normal.   Eyes:      General: No scleral icterus.        Right eye: No discharge.         Left eye: No discharge.      Conjunctiva/sclera: Conjunctivae normal.   Neck:      Thyroid: No thyromegaly.   Cardiovascular:      Rate and Rhythm: Regular rhythm. Tachycardia present.      Pulses: Normal pulses.      Heart sounds: No murmur heard.     Pulmonary:      Effort: Pulmonary effort is normal. No respiratory distress.   Abdominal:      General: There is no distension.      Palpations: Abdomen is soft. There is no mass.      Tenderness: There is no abdominal tenderness. There is no guarding.   Genitourinary:     Comments: Clitoral mass smaller by about 25%. Measuring 1.5 x 3 cm. Healing biopsy site noted. Vagina without discharge. Mucosa normal. Vaginal cuff intact.  Uterus, cervix and adnexa surgically absent.  Musculoskeletal:         General: No swelling, tenderness, deformity or signs of injury.      Cervical back: Neck supple.      Right lower leg: No edema.      Left lower leg: No edema.   Lymphadenopathy:      Cervical: Cervical adenopathy (Left " chest wall adenopathy slightly smaller and more mobile today. Some fullness in the left supraclavicular region consistent with adenopathy.) present.   Skin:     General: Skin is warm and dry.      Coloration: Skin is not jaundiced.      Findings: No erythema, lesion or rash.   Neurological:      General: No focal deficit present.      Mental Status: She is alert and oriented to person, place, and time. Mental status is at baseline.      Motor: No abnormal muscle tone.   Psychiatric:         Behavior: Behavior normal.         Thought Content: Thought content normal.       Imaging:  Lab Results   Component Value Date    WBC 7.30 08/05/2021    HGB 6.3 (L) 08/05/2021    HCT 19.2 (L) 08/05/2021    MCV 78.2 (L) 08/05/2021     08/05/2021       Lab Results   Component Value Date    GLUCOSE 382 (H) 08/05/2021    BUN 10 08/05/2021    CREATININE 0.50 (L) 08/05/2021    EGFRIFNONA 117 08/05/2021    BCR 18.2 08/05/2021    K 4.0 08/05/2021    CO2 25.0 08/05/2021    CALCIUM 8.5 (L) 08/05/2021    ALBUMIN 3.00 (L) 08/05/2021    AST 13 08/05/2021    ALT 7 08/05/2021         Assessment / Plan    Shannan Jha is a 51 y.o. 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  She overall doing well with her first cycle.  I suspect she is having a clinical response based on improvement in size of her palpable chest wall adenopathy and her clitoral mass.  Labs were obtained today and were unfortunately significant for grade 3 anemia.  We will have to reschedule infusion for Monday.  A plan for transfusion of 2 units of packed red blood cells was ordered today.  Additionally we will plan to dose reduce the carboplatin to AUC of 5 with next treatment.    Grade 3 anemia: Patient with hemoglobin of 6.3 today.  Patient also reports fatigue and is mildly tachycardic.  We will plan for transfusion of 2 units of red blood cells will  recheck CBC prior to infusion on Monday.  Carboplatin dose reduced with cycle 2    Cancer-related pain: Currently on oxycodone, ibuprofen, gabapentin.  Patient referred to pain and palliative but not able to get appointment until September. Today, she was switched to SR oxycodone 30 mg BID with plans to use IR oxycodone for breakthrough. She was also seen by Dr. Koehler of radiation oncology. She started radiation but felt that this was making her symptoms worse so she has since canceled.     Constipation: Grade 1.  Plan continue Colace senna milligrams twice a day.  Senna daily and MiraLAX also ordered.    Anxiety/depression/insomnia: Referred to Jolly Kuhn for assistance in management - missed appointment. Encouraged reschedule.  Started on Remeron due to insomnia and low appetite.She will continue her buspar and paxil.    Menopausal hot flushes: Slightly improved with gabapentin. Could consider adding clonidine in the future.  Would like to avoid HRT at this time.    Bilateral lower extremity swelling: Likely related to pelvic disease burden.  Hopefully this improves with chemotherapy and radiation.  Discussed with DVT precautions.     Future considerations: Amanuel demonstrates MSI and high TMB making her her a candidate for immunotherapy in the future. Pathogenic ARID1A, PTEN, PD-L1, and KRAS mutations. BRCA 1/2, hormone receptor.     Pain assessment was performed today as a part of patient’s care.  For patients with pain related to surgery, gynecologic malignancy or cancer treatment, the plan is as noted in the assessment/plan.  For patients with pain not related to these issues, they are to seek any further needed care from a more appropriate provider, such as PCP.      Diagnoses and all orders for this visit:    1. Examination prior to chemotherapy (Primary)    2. Endometrial cancer (CMS/HCC)  -     Hemoglobin A1c; Future  -     CBC and Differential; Future  -     Comprehensive metabolic panel; Future  -      Cancel: Lab Appointment Request; Future  -     Cancel: Clinic Appointment Request; Future  -     Cancel: Infusion Appointment Request 7; Future  -     Morphine (MS Contin) 30 MG 12 hr tablet; Take 1 tablet by mouth 2 (Two) Times a Day for 30 days. 1 tablet every 12 hours  Dispense: 60 tablet; Refill: 0  -     sodium chloride 0.9 % infusion 250 mL  -     dexamethasone (DECADRON) 20 mg in sodium chloride 0.9 % IVPB  -     famotidine (PEPCID) injection 20 mg  -     diphenhydrAMINE (BENADRYL) 50 mg in sodium chloride 0.9 % 50 mL IVPB  -     palonosetron (ALOXI) injection 0.25 mg  -     fosaprepitant (EMEND) 150 mg in sodium chloride 0.9 % 100 mL IVPB  -     PACLitaxel (TAXOL) 385 mg in sodium chloride 0.9 % 564.2 mL chemo IVPB  -     hydrocortisone sodium succinate (Solu-CORTEF) injection 100 mg  -     diphenhydrAMINE (BENADRYL) injection 50 mg  -     famotidine (PEPCID) injection 20 mg  -     Hemoglobin A1c; Future  -     CBC and Differential; Future  -     Comprehensive metabolic panel; Future  -     Lab Appointment Request; Future  -     Clinic Appointment Request; Future  -     Infusion Appointment Request 7; Future    3. Type 2 diabetes mellitus with other specified complication, unspecified whether long term insulin use (CMS/[facility])  -     Hemoglobin A1c; Future  -     CBC and Differential; Future  -     Comprehensive metabolic panel; Future  -     Cancel: Lab Appointment Request; Future  -     Cancel: Clinic Appointment Request; Future  -     Cancel: Infusion Appointment Request 7; Future  -     Hemoglobin A1c; Future  -     CBC and Differential; Future  -     Comprehensive metabolic panel; Future  -     Lab Appointment Request; Future  -     Clinic Appointment Request; Future  -     Infusion Appointment Request 7; Future    4. Cancer related pain  -     Morphine (MS Contin) 30 MG 12 hr tablet; Take 1 tablet by mouth 2 (Two) Times a Day for 30 days. 1 tablet every 12 hours  Dispense: 60 tablet; Refill: 0    Other  orders  -     docusate sodium (COLACE) 100 MG capsule; Take 1 capsule by mouth 2 (Two) Times a Day.  Dispense: 60 capsule; Refill: 3  -     Sennosides (Senna) 8.6 MG capsule; Take 1 capsule by mouth Daily.  Dispense: 30 each; Refill: 3  -     polyethylene glycol (MIRALAX) 17 GM/SCOOP powder; Take 17 g by mouth Daily. As needed for constipation if no bowel movement in 2 days  Dispense: 250 g; Refill: 1  -     Cancel: CARBOplatin (PARAPLATIN) 900 mg in sodium chloride 0.9 % 340 mL chemo IVPB         Follow Up: In 3 weeks for infusion.    MIL Jensen MD  Gynecologic Oncology     Please note that portions of this note may have been completed with a voice recognition program.

## 2021-08-05 NOTE — TELEPHONE ENCOUNTER
Jolly Infusion RN called and stated that pt's H&H was 6.3 and 19.2.  Pt is symptomatic.      RN informed Dr. Jensen who stated pt needed 2 units PRBC's and then treatment tomorrow or Monday.      RN called Jolly back and let her know that this infusion appt would be used to give blood and then RN called scheduling for pt's new appt Monday at 8:30am for treatment.

## 2021-08-06 NOTE — PROGRESS NOTES
Pt came in on 08/05/21 H&H = 6.3/19.2, Pt was T&C and received one unit PRBCs.  Today, 08/06/21 pt return for her second unit of PRBCs.  She was dizzy, lightheaded, diaphoretic, and pale.  The patient had to be transported in via wheelchair to keep from passing out.  H&H was re-check 6.6/20, Dr Jensen was notified and orders were to transfuse an addition unit of blood today.  Pt will return on 08/09/21 for cycle 2 of Taxol/Carbo.

## 2021-08-06 NOTE — TELEPHONE ENCOUNTER
Varsha Alvarez RN called to let RN know that H&H had only come up to 6.6/20 with the 1 unit PRBC's transfused yesterday.  Wanted to know if Dr. Jensen wanted to give an additional unit of blood today.      RN called into the OR and gave information to Estefani Abarca RN and she relayed message to Dr. Jensen who ordered the 3rd unit of blood to be given.    RN relayed that order to Varsha Alvarez RN and pt will receive that extra unit of blood.

## 2021-08-09 NOTE — TELEPHONE ENCOUNTER
RN called pt w/ new appt for visit w/ Dr. Jensen and infusion for 8/12.  Pt v/u.  RN went over precautions for between now and then for when to go to the ER.  Pt v/u.  Pt asked for a refill on her pain medication.  RN let Dr. Jensen know and she advised that MS Wilver had been called in for her.  RN let the pt know that she could pick that up.  Pt v/u.

## 2021-08-09 NOTE — TELEPHONE ENCOUNTER
Caller: [id number] - [place], [place] - [address] [facility] [address] - [phone]  - [phone] FX    Relationship: Pharmacy    Best call back number: [phone]    Medication needed:   Requested Prescriptions     Pending Prescriptions Disp Refills   • Morphine (MS Contin) 30 MG 12 hr tablet 60 tablet 0     Sig: Take 1 tablet by mouth 2 (Two) Times a Day for 30 days. 1 tablet every 12 hours       When do you need the refill by: 8/9/21    What additional details did the patient provide when requesting the medication: PHSRMSCY DID NOT REC ORIGINAL ORDER 8/5    Does the patient have less than a 3 day supply:  [] Yes  [x] No    What is the patient's preferred pharmacy:    Pharmacy    [facility] #[id number] - [place], [place] - [address] AT [facility] [address] - [phone] [facility] [phone] FX   [address] [address]   Phone:  [phone]  Fax:  [phone]

## 2021-08-11 NOTE — PROGRESS NOTES
Chemotherapy Treatment Office Visit      Patient Name: Shannan Jha  : 1970   MRN: [id number]     Chief Complaint:  Uterine cancer, chemotherapy    History of Present Illness: Shannan Jha is a 51 y.o. female who presents today for consideration of her second cycle of chemotherapy.  Her major continued complaint today is pain.  She is taking the ibuprofen, gabapentin and oxycodone 10 mg as prescribed.  Also recently started MS Contin.  She feels like her pain control is improved with the long-acting morphine.  However she continues to have 8 out of 10 hip and back pain.  She is unable to lay flat and is sleeping in her recliner.  She also reports poor appetite despite the Remeron.  Is having trouble sleeping mostly from a pain standpoint though she thinks the Remeron might be helping.  She denies any problems with her bowel or bladder.  She denies any numbness or tingling in her hands or feet at this time.  She does have swelling of her bilateral lower extremities.  She does not feel ready for treatment today.    Oncologic History:  Oncology/Hematology History   Endometrial cancer (CMS/HCC)   6/3/2021 Initial Diagnosis    Referred by Dr. Nancy Pearce    2021: TVUS with uterus measuring 3.4 x 6.8 x 6.4 cm with an endometrial stripe thickness of 3.2 mm.  Left and right ovaries normal.  2021: Biopsy of cervical polyp demonstrates carcinoma most consistent with endometrial adenocarcinoma     2021 Surgery    Robotic assisted radical tumor debulking with total laparoscopic hysterectomy for uterus weighing greater than 250 g, bilateral salpingo-oophorectomy, left ureterolysis, peritoneal stripping, lysis of adhesions, cystoscopy with left temporary stent placement, and biopsy of clitoral mass.    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  Right fallopian  tube and ovary negative.  Extensive LVSI noted.  Clitoral munoz mass with dedifferentiated carcinoma myometrium.    [facility]gery at [facility] by Jolie Jensen MD       7/2/2021 Imaging    Stat CT scans ordered for enlarging chest wall mass noted at problem visit.    CT C/A/P with diffuse metastatic disease.  Parenchymal liver lesions suspicious for metastases noted.  Adenopathy noted in the left supraclavicular, left retropectoral, mediastinum, janice hepatis, retroperitoneum and pelvis.  Pulmonary nodules concerning for metastasis.  Lobulated soft tissue mass in the left upper pelvis most likely lymph node.  Soft tissue mass at peritoneum measuring 4.5 x 4.1 cm corresponding to the palpable clitoral mass.     7/2/2021 Cancer Staged    Staging form: Corpus Uteri - Carcinoma And Carcinosarcoma, AJCC 8th Edition  - Clinical stage from 7/2/2021: FIGO Stage IVB (cN2a, pM1) - Signed by Jolie Jensen MD on 7/6/2021     7/15/2021 -  Chemotherapy    OP UTERINE PACLitaxel / CARBOplatin (Q21D)        No components found for: .3     I have reviewed and the following portions of the patient's history were updated as appropriate: past family history, past medical history, past social history, past surgical history, past obstetrics/gynecologic history and problem list.    Medications: The current medication list was reviewed with the patient and updated in the EMR this date per the Medical Assistant. Medication dosages and frequencies were confirmed to be accurate.      Allergies:   Allergies   Allergen Reactions   • Codeine Nausea Only and Dizziness       Subjective    Review of Systems:   Review of Systems   Constitutional: Positive for activity change and fatigue. Negative for chills and fever.   Respiratory: Positive for shortness of breath. Negative for cough and wheezing.    Cardiovascular: Negative for chest pain, palpitations and leg swelling.   Gastrointestinal: Positive for abdominal pain and nausea. Negative for  "constipation, diarrhea and vomiting.   Genitourinary: Negative for dysuria, pelvic pain, urgency, vaginal bleeding, vaginal discharge and vaginal pain.   Musculoskeletal: Positive for arthralgias, back pain and myalgias.   Neurological: Negative for dizziness and light-headedness.          Objective     Physical Exam:  Vital Signs:   Vitals:    08/12/21 0836   BP: 126/72  Comment: LUE   Pulse: 104   Resp: 18   Temp: 96.9 °F (36.1 °C)   TempSrc: Infrared   SpO2: 95%  Comment: RA   Weight: 91.6 kg (202 lb)   Height: 175.3 cm (69\")   PainSc:   8   PainLoc: Hip     BMI: Body mass index is 29.83 kg/m².   ECOG PS: 2       PHQ-2 Depression Screening  Little interest or pleasure in doing things?     Feeling down, depressed, or hopeless?     PHQ-2 Total Score         Physical Exam  Vitals and nursing note reviewed. Exam conducted with a chaperone present.   Constitutional:       General: She is not in acute distress.     Appearance: Normal appearance. She is well-developed. She is not diaphoretic.      Comments: Pale   HENT:      Head: Normocephalic and atraumatic.      Right Ear: External ear normal.      Left Ear: External ear normal.   Eyes:      General: No scleral icterus.        Right eye: No discharge.         Left eye: No discharge.      Conjunctiva/sclera: Conjunctivae normal.   Neck:      Thyroid: No thyromegaly.   Cardiovascular:      Rate and Rhythm: Regular rhythm. Tachycardia present.      Pulses: Normal pulses.      Heart sounds: No murmur heard.     Pulmonary:      Effort: Pulmonary effort is normal. No respiratory distress.   Abdominal:      General: There is no distension.      Palpations: Abdomen is soft. There is no mass.      Tenderness: There is no abdominal tenderness. There is no guarding.   Genitourinary:     Comments: Exam deferred today - Prior exam included for historical purposes. Clitoral mass smaller by about 25%. Measuring 1.5 x 3 cm. Healing biopsy site noted. Vagina without discharge. Mucosa " normal. Vaginal cuff intact.  Uterus, cervix and adnexa surgically absent.  Musculoskeletal:         General: No swelling, tenderness, deformity or signs of injury.      Right lower leg: Edema (1+ pitting edema) present.      Left lower leg: Edema (1+ pitting edema) present.   Skin:     General: Skin is warm and dry.      Coloration: Skin is not jaundiced.      Findings: No erythema, lesion or rash.   Neurological:      General: No focal deficit present.      Mental Status: She is alert and oriented to person, place, and time. Mental status is at baseline.      Motor: No abnormal muscle tone.   Psychiatric:         Behavior: Behavior normal.         Thought Content: Thought content normal.       Imaging:  Lab Results   Component Value Date    WBC 5.60 08/12/2021    HGB 7.3 (L) 08/12/2021    HCT 21.1 (L) 08/12/2021    MCV 79.3 08/12/2021     08/12/2021       Lab Results   Component Value Date    GLUCOSE 382 (H) 08/05/2021    BUN 10 08/05/2021    CREATININE 0.50 (L) 08/05/2021    EGFRIFNONA 117 08/05/2021    BCR 18.2 08/05/2021    K 4.0 08/05/2021    CO2 25.0 08/05/2021    CALCIUM 8.5 (L) 08/05/2021    ALBUMIN 3.00 (L) 08/05/2021    AST 13 08/05/2021    ALT 7 08/05/2021         Assessment / Plan    Shannan Jha is a 51 y.o. 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 Cycle 2 delayed due to grade 3 anemia.  Patient reports that she is not doing well from the pain and appetite standpoint.  Currently her performance status is a 2-3.  We will need to delay treatment for 1 week while we work on pain control and appetite.  Patient is amenable to having a CBC repeated today with plan for transfusion if needed.  Get the patient rescheduled for possible treatment next Thursday.    Grade 3 anemia: Patient received 3 units of packed RBCs last week.  We will plan to reduce the carboplatin to an AUC of 5.  CBC  today with hemoglobin of 7.3. Will plan for 2 units of packed RBCS today.    Cancer-related pain: Currently on oxycodone, ibuprofen, gabapentin.  Issue with rx for MS contin but this was resent/ Patient reports since starting MS contin that her pain controlled has improved but is still very significant.  A refill of the oxycodone 10 mg was provided for breakthrough pain.  We discussed use of TENS unit for her sciatica back pain.  I also encouraged patient to continue take ibuprofen and Tylenol scheduled around-the-clock even when pain is well controlled.  We were able to move her pain palliative appointment up to next week.  Patient encouraged to keep this appointment.  I also offered referral to massage therapy.  Patient would like to consider.    Decreased appetite with significant weight loss: Already on remeron. Will add megace 80 mg BID to help with appetite. Previously seen by dietician. Will continue to closely monitor.    Constipation: Grade 1.  Continue Colace senna milligrams twice a day.  Senna daily and MiraLAX also ordered.    Anxiety/depression/insomnia: Referred to Jolly Kuhn for assistance in management - missed appointment. Encouraged reschedule.  Started on Remeron due to insomnia and low appetite.She will continue her buspar and paxil.    Bilateral lower extremity swelling: Likely related to pelvic disease burden.  Hopefully this improves with chemotherapy. Discussed with DVT precautions.     Future considerations: Amanuel demonstrates MSI and high TMB making her her a candidate for immunotherapy in the future. Pathogenic ARID1A, PTEN, PD-L1, and KRAS mutations. BRCA 1/2, hormone receptor.     Pain assessment was performed today as a part of patient’s care.  For patients with pain related to surgery, gynecologic malignancy or cancer treatment, the plan is as noted in the assessment/plan.  For patients with pain not related to these issues, they are to seek any further needed care from a more  appropriate provider, such as PCP.      Diagnoses and all orders for this visit:    1. Endometrial cancer (CMS/HCC)  -     CBC & Differential; Future  -     oxyCODONE (ROXICODONE) 10 MG tablet; Take 1-2 tablets by mouth every 6 hours as needed for pain.  Dispense: 90 tablet; Refill: 0    2. Cancer-related pain  -     oxyCODONE (ROXICODONE) 10 MG tablet; Take 1-2 tablets by mouth every 6 hours as needed for pain.  Dispense: 90 tablet; Refill: 0    Other orders  -     Cancel: ondansetron (ZOFRAN) 8 MG tablet; Take 1 tablet by mouth 3 (Three) Times a Day As Needed for Nausea or Vomiting.  Dispense: 30 tablet; Refill: 5  -     megestrol (MEGACE) 40 MG tablet; Take 2 tablets by mouth 2 (Two) Times a Day.  Dispense: 120 tablet; Refill: 1         Follow Up: Blood today. Rescheduled infusion for next Thursday.    MIL Jensen MD  Gynecologic Oncology     Please note that portions of this note may have been completed with a voice recognition program.

## 2021-08-12 PROBLEM — E86.0 DEHYDRATION: Status: ACTIVE | Noted: 2021-01-01

## 2021-08-12 NOTE — PROGRESS NOTES
After patient's sent to infusion for blood transfusion her chemistry resulted.  Her chemistry was significant for hyperglycemia for which patient was given 10 units of insulin.  She also was noted to be hyponatremic with a sodium of 124.  Fortunately patient asymptomatic from this standpoint.  I plan for her is to administer 2 units of blood with normal saline and recheck a sodium level prior to discharge today.  I suspect that this is potentially due to either hypotonic hyponatremia given her hyperglycemia today as well as has some hypotonic hyponatremia due to decreased oral intake.  The patient then got out of traversing and blind saying that she was feeling unwell from a pain standpoint wanted to be discharged home prior to getting her second unit of having her sodium rechecked.  She reported that she would be able to come in tomorrow for her second unit of blood as well as repeat sodium.  I did strongly encourage the patient to complete her transfusion and repeat her labs today but she is just physically unable to do this.  I recommended the patient focus on high sodium foods today as well as drinking Pedialyte and Gatorade or other electrolyte-containing solutions rather than free water.  Patient verbalizes understanding.  ED precautions discussed.  She will return tomorrow for her second unit of blood and her repeat labs.  In the event that her sodium continues to downtrend or she becomes symptomatic I will recommend inpatient admission for evaluation and correction.    Electronically signed by Jolie Jensen MD, 08/12/21, 2:39 PM EDT.

## 2021-08-12 NOTE — TELEPHONE ENCOUNTER
Pharmacy Name:  [facility]    Pharmacy representative name: GUCCI    Pharmacy representative phone number: [phone]    What medication are you calling in regards to: OXYCODONE 10MG     What question does the pharmacy have: THEY RECEIVED A RX FOR OXYCODONE 10MG. THAT MEDICATION IS ON BACKORDER WITH THE  AND THEY DO NOT HAVE A RELEASE DATE.     Who is the provider that prescribed the medication: DR VAZQUEZ

## 2021-08-17 PROBLEM — K59.03 DRUG INDUCED CONSTIPATION: Status: ACTIVE | Noted: 2021-01-01

## 2021-08-17 PROBLEM — G89.3 CANCER RELATED PAIN: Status: ACTIVE | Noted: 2021-01-01

## 2021-08-19 PROBLEM — D64.9 ACUTE ANEMIA: Status: ACTIVE | Noted: 2021-01-01

## 2021-08-19 PROBLEM — R53.1 GENERALIZED WEAKNESS: Status: ACTIVE | Noted: 2021-01-01

## 2021-08-19 PROBLEM — C54.1 ENDOMETRIAL CANCER, FIGO STAGE IVB (HCC): Status: ACTIVE | Noted: 2021-01-01

## 2021-08-19 NOTE — H&P
[facility] Medicine Services  HISTORY AND PHYSICAL    Patient Name: Shannan Jha  : 1970  MRN: [id number]  Primary Care Physician: Maricel Quinteros MD  Date of admission: 2021    Subjective     Chief Complaint: anemia, weakness    HPI:  Shannan Jha is a 51 y.o. female with PMH significant for HTN, HLD, insulin-dependent DMII and hypothyroidism. On 21 a TVUS revealed 3.2cm endometrial stripe. Biopsy of a cervical polyp was concerning for endometrial adenocarcinoma. She was referred to GYN/ONC and underwent robotic-assisted tumor debulking with total laparoscopic hysterectomy for uterus weighing > 250g, BSO, L ureterolysis, peritoneal stripping, lysis of adhesions, cyscoscopy with L temporary stent placement and biopsy of clitoral mass. Pathology returned with dedifferentiated endometrial carcinoma with involvement of the L fallopian tube and ovary and R periadnexal soft tissue. Clitoral munoz mass with dedifferentiated carcinoma myometrium. CT abdomen/pelvis showed diffuse metastatic disease with parenchymal liver lesions, suspicious for metastasis as well as supraclavicular, L rectopectoral, mediastinal, janice hepatis, retroperitoneal and pelvic adenopathy. Soft tissue mass at peritoneum measuring 4.5 x 4.1 cm.    She was placed on PACLitaxel/CARBOplatin Q21D for metastatic endometrial cancer.   She has since had issues with pain, hyponatremia and anemia.     She was directly admitted to [facility] 21 following an appointment with Dr. Jensen of GYN/ONC. Patient responded minimally and the majority of history was obtained from the patient's  and her chart. Ms. Carpenter has been getting increasingly weak over the past couple of weeks. Her oral intake has been poor. Her pain has been poorly-controlled with her current regimen. No fevers/chills, abdominal pain, nausea or vomiting. No chest pain or dyspnea.  denied confusion but he did note  that her speech was difficult to understand when she first arrived to her hospital room.     CBC prior to admission revealed Hgb 6.4   CMP / Mag / Phos still pending.     Patient and her  tearful regarding goals of care discussions. They are open to palliative care consultation but seemed somewhat overwhelmed when we discussed power of , advanced directive and code status. I suggested that they take some time to think it over and we could revisit this topic in the upcoming says. They were in agreement for FULL CODE at this time.     COVID Details:    Symptoms:    [x] NONE [] Fever []  Cough [] Shortness of breath [] Change in taste/smell      The patient has a COVID HM Topic on their chart, and they are fully vaccinated.    Review of Systems   Constitutional: Positive for activity change, appetite change and fatigue. Negative for chills and fever.   HENT: Negative.    Respiratory: Negative.    Cardiovascular: Positive for leg swelling.   Gastrointestinal: Negative for abdominal pain, constipation, diarrhea, nausea and vomiting.   Genitourinary: Negative for difficulty urinating.   Musculoskeletal: Positive for arthralgias, back pain and neck pain.   Skin: Negative.    Neurological: Positive for weakness.   Hematological: Negative.    Psychiatric/Behavioral: Negative.       All other systems reviewed and are negative.     Personal History     Past Medical History:   Diagnosis Date   • Adenocarcinoma (CMS/HCC)     endometrial   • Anxiety    • Depression    • Diabetes (CMS/HCC)    • GERD (gastroesophageal reflux disease)    • Heart murmur    • Hyperlipidemia    • Hypertension    • Thyroid disease    • Uterine cancer (CMS/HCC)      Past Surgical History:   Procedure Laterality Date   • ENDOSCOPY     • GALLBLADDER SURGERY     • TOTAL LAPAROSCOPIC HYSTERECTOMY SALPINGO OOPHORECTOMY N/A 6/18/2021    Procedure: TOTAL LAPAROSCOPIC RADICAL TUMOR DEBULKING WITH TOTAL HYSTERECTOMY (UTERUS > 250 g), BILATERAL  SALPINGO-OOPHORECTOMY, LEFT URETROLYSIS, PERTIONEAL STRIPPING, LYSIS OF ADHESIONS (>90 MINUTES), CYSTOSCOPY WITH LEFT TEMPORARY STENT PLACEMENT, AND INJECTION FOR SENTINEL LYMPH NODE DISSECTION;  Surgeon: Jolie Jensen MD;  [facility]: [facility];  Service: Robotics - DaVinci;  Laterality: N/A   • WISDOM TOOTH EXTRACTION       Family History: family history includes Arthritis in her father; Cancer in her father; Diabetes in her brother and mother; Heart attack in her mother; Heart disease in her mother; Stroke in her mother; Thyroid disease in her brother and father. Otherwise pertinent FHx was reviewed and unremarkable.     Social History:  reports that she has never smoked. She has never used smokeless tobacco. She reports that she does not drink alcohol and does not use drugs.  Social History     Social History Narrative   • Not on file     Medications:  Black Cohosh, Dapagliflozin Propanediol, Insulin Regular Human (Conc), Morphine, Omega-3 Fatty Acids, PARoxetine, Senna, acetaminophen, busPIRone, dexamethasone, docusate sodium, gabapentin, glimepiride, ibuprofen, levothyroxine, lisinopril, loratadine, megestrol, metFORMIN ER, mirtazapine, multivitamin, omeprazole, ondansetron, oxyCODONE, oxyCODONE-acetaminophen, polyethylene glycol, prochlorperazine, and rosuvastatin    Allergies   Allergen Reactions   • Codeine Nausea Only and Dizziness     Objective     Vital Signs:   Temp:  [95.7 °F (35.4 °C)-100.1 °F (37.8 °C)] 100.1 °F (37.8 °C)  Heart Rate:  [] 96  Resp:  [14-18] 16  BP: (112-123)/(59-70) 115/68    Physical Exam   Constitutional: Awake, alert and conversant. Sitting on side of bed   Eyes: PERRLA, sclerae anicteric, no conjunctival injection  HENT: NCAT, mucous membranes dry   Neck: Supple, no thyromegaly, no lymphadenopathy, trachea midline  Respiratory: Clear to auscultation bilaterally, nonlabored respirations on room air   Cardiovascular: Regular rhythm, tachycardic with rate 110's. No m/r/g.    Gastrointestinal: Positive bowel sounds, soft, nontender, mildly distended  Musculoskeletal: 2+ pitting bilateral ankle edema, no clubbing or cyanosis to extremities  Psychiatric: Flat affect, cooperative  Neurologic: Globally weak, speech soft but clear (1-2 word responses), no focal deficits     Results Reviewed:  I have personally reviewed most recent indicated data and agree with findings including:  [x]  Laboratory  []  Radiology  []  EKG/Telemetry  [x]  Pathology  []  Cardiac/Vascular Studies  [x]  Old records  []  Other:    LAB RESULTS:      Lab 08/19/21 1953 08/19/21 1749 08/19/21  1418   WBC  --  4.28 4.70   HEMOGLOBIN  --  6.4* 6.4*   HEMATOCRIT  --  18.9* 18.9*   PLATELETS  --  135* 177   NEUTROS ABS  --  3.03 3.80   IMMATURE GRANS (ABS)  --  0.43*  --    LYMPHS ABS  --  0.41* 0.60*   MONOS ABS  --  0.35 0.30   EOS ABS  --  0.05  --    MCV  --  78.1* 80.7   LACTATE 2.0  --   --          Lab 08/19/21 2202 08/19/21 1749   SODIUM 124* 125*   POTASSIUM 3.9 4.4   CHLORIDE 85* 84*   CO2 25.0 23.0   ANION GAP 14.0 18.0*   BUN 11 13   CREATININE 0.44* 0.49*   GLUCOSE 285* 362*   CALCIUM 8.7 9.4   IONIZED CALCIUM  --  1.22   MAGNESIUM 1.8 1.9   PHOSPHORUS  --  3.7   HEMOGLOBIN A1C  --  7.80*         Lab 08/19/21 1749   TOTAL PROTEIN 6.5   ALBUMIN 3.10*   GLOBULIN 3.4   ALT (SGPT) 22   AST (SGOT) 86*   BILIRUBIN 1.4*   ALK PHOS 330*                 Lab 08/19/21 1749   ABO TYPING O   RH TYPING Positive   ANTIBODY SCREEN Negative         Brief Urine Lab Results  (Last result in the past 365 days)      Color   Clarity   Blood   Leuk Est   Nitrite   Protein   CREAT   Urine HCG        08/19/21 2158 Yellow Clear Moderate (2+) Trace Positive 100 mg/dL (2+)             Microbiology Results (last 10 days)     Procedure Component Value - Date/Time    COVID PRE-OP / PRE-PROCEDURE SCREENING ORDER (NO ISOLATION) - Swab, Nasopharynx [[id number]]  (Normal) Collected: 08/19/21 2015    Lab Status: Final result Specimen: Swab  from Nasopharynx Updated: 08/19/21 2034    Narrative:      The following orders were created for panel order COVID PRE-OP / PRE-PROCEDURE SCREENING ORDER (NO ISOLATION) - Swab, Nasopharynx.  Procedure                               Abnormality         Status                     ---------                               -----------         ------                     COVID-19, ABBOTT IN-HOUS...[[id number]]  Normal              Final result                 Please view results for these tests on the individual orders.    COVID-19, ABBOTT IN-HOUSE,NASAL Swab (NO TRANSPORT MEDIA) 2 HR TAT - Swab, Nasopharynx [[id number]]  (Normal) Collected: 08/19/21 2015    Lab Status: Final result Specimen: Swab from Nasopharynx Updated: 08/19/21 2034     COVID19 Presumptive Negative    Narrative:      Fact sheet for providers: https://www.fda.gov/media/701887/download     Fact sheet for patients: https://www.fda.gov/media/009371/download    Test performed by PCR.  If inconsistent with clinical signs and symptoms patient should be tested with different authorized molecular test.        CT Head With & Without Contrast    Result Date: 8/19/2021  HISTORY: Mental status change, metastatic cancer TECHNIQUE: Axial head CT with and without IV contrast. Radiation dose reduction techniques included automated exposure control or exposure modulation based on body size. Count of known CT and cardiac nuc med studies performed in previous 12 months: 0. COMPARISON: None. FINDINGS: Prominently dural based lesion is seen abutting the left posterior cerebellar hemisphere measuring approximately 2 x 1.1 cm that demonstrates enhancement. There also appears to be some potential dural thickening at the posterior apex abutting the posterior left frontal lobe, although this is somewhat equivocal. There is no definite large intraparenchymal lesion although subtle smaller lesions cannot be entirely excluded. There is no definite osseous abnormality. The sphenoid  sinuses are opacified.     Impression: Overall, the findings are concerning for potential dural based metastases. The largest abuts the posterior left cerebellum and there is an equivocal second lesion at the left posterior vertex. In addition, small intraparenchymal metastases are not excluded. Consider follow-up MRI with contrast. Signer Name: Martina Malin MD  Signed: 8/19/2021 9:21 PM  Workstation Name: [id number]  [facility]    CT Abdomen Pelvis With Contrast    Result Date: 8/19/2021  CT Abdomen Pelvis W, CTA Chest INDICATION: Mental status change, endometrial carcinoma, assess for progression TECHNIQUE: CTA of the chest. CT of the abdomen and pelvis without IV contrast. Coronal and sagittal reconstructions were obtained.  Radiation dose reduction techniques included automated exposure control or exposure modulation based on body size. Count of known CT and cardiac nuc med studies performed in previous 12 months: 0. COMPARISON: CT of the abdomen from 7/2/21 FINDINGS: CTA chest: There is no definite evidence of pulmonary embolism although there is somewhat limitation of evaluation for small subsegmental embolus. The patient's left supraclavicular lymphadenopathy and left low cervical lymphadenopathy has significantly increased in size and there is shift of the trachea to the right. Multilevel mediastinal nodes have significantly increased in size and there is significant narrowing of the eleonora and mainstem bronchi bilaterally. The esophageal lumen is also compressed and may be significantly involved. There are new small bilateral pleural effusions, greater on the right as well as multiple new lung metastases noted bilaterally. Bone marrow attenuation is somewhat mottled and underlying metastatic disease cannot be entirely excluded although no definite focal lesion is identified. Abdomen: Multiple new metastatic lesions are noted throughout the liver. Lesions are noted within the bilateral  adrenal glands, and the spleen is not well evaluated due to beam hardening artifact. However, there is suspicion for multiple splenic lesions as well. Multiple large jeana masses are seen scattered throughout the abdomen. There is also flattening of the inferior vena cava secondary to jeana mass. Pelvis: Malignant lesion abuts the wall of the rectum anteriorly. There is a small amount of fluid in the pelvis. Multiple abnormal aortocaval and periaortic nodes are noted. No acute bowel abnormality is appreciated. No acute osseous abnormality.     Impression: 1. There has been gross interval progression of metastatic disease as described above throughout the chest, abdomen and pelvis. 2. There is no definite evidence of pulmonary embolism although there is somewhat limitation of evaluation for small subsegmental embolus. Signer Name: Martina Malin MD  Signed: 8/19/2021 9:31 PM  Workstation Name: [id number]  [facility] [place]    CT Angiogram Chest    Result Date: 8/19/2021  CT Abdomen Pelvis W, CTA Chest INDICATION: Mental status change, endometrial carcinoma, assess for progression TECHNIQUE: CTA of the chest. CT of the abdomen and pelvis without IV contrast. Coronal and sagittal reconstructions were obtained.  Radiation dose reduction techniques included automated exposure control or exposure modulation based on body size. Count of known CT and cardiac nuc med studies performed in previous 12 months: 0. COMPARISON: CT of the abdomen from 7/2/21 FINDINGS: CTA chest: There is no definite evidence of pulmonary embolism although there is somewhat limitation of evaluation for small subsegmental embolus. The patient's left supraclavicular lymphadenopathy and left low cervical lymphadenopathy has significantly increased in size and there is shift of the trachea to the right. Multilevel mediastinal nodes have significantly increased in size and there is significant narrowing of the eleonora and mainstem bronchi  bilaterally. The esophageal lumen is also compressed and may be significantly involved. There are new small bilateral pleural effusions, greater on the right as well as multiple new lung metastases noted bilaterally. Bone marrow attenuation is somewhat mottled and underlying metastatic disease cannot be entirely excluded although no definite focal lesion is identified. Abdomen: Multiple new metastatic lesions are noted throughout the liver. Lesions are noted within the bilateral adrenal glands, and the spleen is not well evaluated due to beam hardening artifact. However, there is suspicion for multiple splenic lesions as well. Multiple large jeana masses are seen scattered throughout the abdomen. There is also flattening of the inferior vena cava secondary to jeana mass. Pelvis: Malignant lesion abuts the wall of the rectum anteriorly. There is a small amount of fluid in the pelvis. Multiple abnormal aortocaval and periaortic nodes are noted. No acute bowel abnormality is appreciated. No acute osseous abnormality.     Impression: 1. There has been gross interval progression of metastatic disease as described above throughout the chest, abdomen and pelvis. 2. There is no definite evidence of pulmonary embolism although there is somewhat limitation of evaluation for small subsegmental embolus. Signer Name: Martina Malin MD  Signed: 8/19/2021 9:31 PM  Workstation Name: [id number]  [facility] [place]      Assessment & Plan       Endometrial cancer (CMS/HCC)    Type 2 diabetes mellitus (CMS/HCC)    Dehydration    Drug induced constipation    Cancer related pain    Acute anemia    Generalized weakness    Endometrial cancer, FIGO stage IVB (CMS/HCC)       Shannan Jha is a 51 y.o. female w/ hx insulin dep DM2, hypothyroidism who recently was diagnosed w/ endometrial cancer 5/20/21 w/ biopsy of cervical polyp (after ultrasound revealed thickened endometrial stripe). On 6/18/21 patient nderwent robitic  assisted laparoscopic SUSHILA, BSO, left ureterolysis, peritoneal stripping, sussy, cystoscopy w/ temporary stent placement biopsy of clitoral mass. Pathology revealed endometrial carcinoma w/ subsequent ct scan 7/2/21 revealing diffuse metastatic disease involving liver, diffuse adenopathy (left supraclavicular, left retropectoral, mediastinal, janice hepatis, retroperitoneum, and pelvis). Patient underwent chemotherapy on 7/15/21 (paclitaxel and carboplatin). Patient has developed hyponatremia and anemia. Labs on 8/12/21 revealed sodium 124 (w/ glucose 331) and hgb 7.3. patient.        Presented back to Dr. Jensen's office 8/19/21 w/ worsening fatigue, confusion, ataxia and worsening right neck pain radiating to her head and back, having not eaten much in the two days prior related to difficulty swallowing and choking when she eats or drinks, along with some dyspnea. hgb was noted to be 6.4 and arrangements were made for direct admission to hospitalist service. Subsequent labwork revealed sodium 125, glucose 362, serum bicarb 23 w/ anion gap 18. Normal lactate. a1c 7.8. mildly elevated bilirubin 1.4. patient was ordered iv fluids, 2 units or blood, and frequent glucose monitoring & correction insulin. Patient developed spells of somnolence, during which patient would close her eyes but was difficult to arouse, brief in duratoin. No tonic/clonic activity, no rigidity, no starting spells, no focal weakness. Patient had another spell lasting 5-10 minutes and was given keppra 1000mg. Ct head/chest/abd/pelvis w/ contrast was obtained.   Ct head revealed probable dural based metastatic disease, largest 2 x 1.1 cm dural based lesion abutting left posterior cerebellar hemisphere w/ enhancement, and some potential dural thickening posterior apex abutting the posterior left frontal lobe. Ct c/a/p revealed no pulmonary embolism, but showed significant diffuse interval progression of metastatic disease including progression of  supraclavicular and cervical lymphadenopathy w/ shift of trachea to right, mediastinal adenopathy w/ narrowing of eleonora and bilateral mainstem bronchi, esophageal lumen compression; new small bilateral effusions w/ new multiple lung mets, worsening dz throughout the liver, splenic lesions, and large jeana masses seen throughout the abdomen and flattening of the inferior vena cava secondary to jeana mass; malignant lesion also abuts the wall of rectum anterioly. Was initiated on decadron 4mg iv q6h and mri brain w & w/o ordered. I had extensive discussion w/ patient and  at bedside and discussed results of the scans.   Due to rapid and diffuse progression of metastatic disease (including brain) we discussed the poor prognosis, and  (patient confused) stated that in event of cardiopulmonary arrest would not wish to pursue mechanical ventilation or cpr, as such patient DNR/DNI, however supporting otherwise at this point. Subsequent urinalysis revealed uti and rocephin was initiated. Palliative consulted for morning and Dr. Jensen consulted as well        Rapidly Progressive Metastatic Endometrial Cancer  Encephalopathy/confusion (due to brain mets, +/- subclinical seizure, + uti, + hyponatremia)  Malignancy related pain  -CT scans suggesting new brain mets, new lung mets, adenopathy (including mediastinal) w/ mainstem bronchi & tracheal displacement, diffuse liver disease, suggestion of splenic dz, and adnopathy displacing the inferior vena cava  -mri brain ordered  -keppra empiric rx  -decadron 4mg iv q6h  -Dr. Jensen consulted for morning  -Palliative consulted (goals/limits, symptoms)  Anemia, likely related to chemotherapy  -hgb 6.4  -transufins 2 unit prbc  Hyponatremia  -q4h bmp  -ns 75cc/hr, monitor  Uncontrolled hyperglycemia/DM2  -hasn't been taking her insulin; currently worsened by steroids  -q3h fsbs w/ medium correction humalog  UTI, poa  -rocephin day #1, follow  culture  Dysphagia  -SLP  -likely due to thrush and esophageal displacement due to cancer/adenopathy  Thrush  -single dose micafungin (couldn't give diflucan due to other home meds)  -nystatin s&s      DVT prophylaxis: mechanical    *total of 70 minutes care provided, >50% spent at bedside counseling patient &     CODE STATUS:    Code Status and Medical Interventions:   Ordered at: 08/19/21 2240     Limited Support to NOT Include:    Intubation     Code Status:    No CPR     Medical Interventions (Level of Support Prior to Arrest):    Limited     This note has been completed as part of a split-shared workflow.     Electronically signed by Perla Ambrose PA-C, 08/19/21, 5:30 PM EDT.          Attending   Admission Attestation       I have seen and examined the patient, performing an independent face-to-face diagnostic evaluation with plan of care reviewed and developed with the advanced practice clinician (APC).      Brief Summary Statement:   Shannan Jha is a 51 y.o. female w/ hx insulin dep DM2, hypothyroidism who recently was diagnosed w/ endometrial cancer 5/20/21 w/ biopsy of cervical polyp (after ultrasound revealed thickened endometrial stripe). On 6/18/21 patient nderwent robitic assisted laparoscopic SUSHILA, BSO, left ureterolysis, peritoneal stripping, sussy, cystoscopy w/ temporary stent placement biopsy of clitoral mass. Pathology revealed endometrial carcinoma w/ subsequent ct scan 7/2/21 revealing diffuse metastatic disease involving liver, diffuse adenopathy (left supraclavicular, left retropectoral, mediastinal, janice hepatis, retroperitoneum, and pelvis). Patient underwent chemotherapy on 7/15/21 (paclitaxel and carboplatin). Patient has developed hyponatremia and anemia. Labs on 8/12/21 revealed sodium 124 (w/ glucose 331) and hgb 7.3. patient.        Presented back to Dr. Jensen's office 8/19/21 w/ worsening fatigue, confusion, ataxia and worsening right neck pain radiating to her head  and back, having not eaten much in the two days prior related to difficulty swallowing and choking when she eats or drinks, along with some dyspnea. hgb was noted to be 6.4 and arrangements were made for direct admission to hospitalist service. Subsequent labwork revealed sodium 125, glucose 362, serum bicarb 23 w/ anion gap 18. Normal lactate. a1c 7.8. mildly elevated bilirubin 1.4. patient was ordered iv fluids, 2 units or blood, and frequent glucose monitoring & correction insulin. Patient developed spells of somnolence, during which patient would close her eyes but was difficult to arouse, brief in duratoin. No tonic/clonic activity, no rigidity, no starting spells, no focal weakness. Patient had another spell lasting 5-10 minutes and was given keppra 1000mg. Ct head/chest/abd/pelvis w/ contrast was obtained.   Ct head revealed probable dural based metastatic disease, largest 2 x 1.1 cm dural based lesion abutting left posterior cerebellar hemisphere w/ enhancement, and some potential dural thickening posterior apex abutting the posterior left frontal lobe. Ct c/a/p revealed no pulmonary embolism, but showed significant diffuse interval progression of metastatic disease including progression of supraclavicular and cervical lymphadenopathy w/ shift of trachea to right, mediastinal adenopathy w/ narrowing of eleonora and bilateral mainstem bronchi, esophageal lumen compression; new small bilateral effusions w/ new multiple lung mets, worsening dz throughout the liver, splenic lesions, and large jeana masses seen throughout the abdomen and flattening of the inferior vena cava secondary to jeana mass; malignant lesion also abuts the wall of rectum anterioly. Was initiated on decadron 4mg iv q6h and mri brain w & w/o ordered. I had extensive discussion w/ patient and  at bedside and discussed results of the scans.   Due to rapid and diffuse progression of metastatic disease (including brain) we discussed the  poor prognosis, and  (patient confused) stated that in event of cardiopulmonary arrest would not wish to pursue mechanical ventilation or cpr, as such patient DNR/DNI, however supporting otherwise at this point. Subsequent urinalysis revealed uti and rocephin was initiated. Palliative consulted for morning and Dr. Jensen consulted as well          Remainder of detailed HPI is as noted by APC and has been reviewed and/or edited by me for completeness.    Attending Physical Exam:  Constitutional: alert, confused, somewhat tangential speech, no distress  Psych:somewhat flat  HEENT:[place], oroph clear  Neck: neck supple, full range of motion  Neuro: face symmetric, speech clear (content tangential), oriented to person, place; right pupil slightly larger than left w/ both pupils reactive, eomi; moves all extremities, neck supple  Cardiac: Rrr; No pretibial pitting edema  Resp: Ctab, normal effort  GI: abd soft, nontender, obese  Skin: No extremity rash  Musculoskeletal/extremities: no cyanosis extremities; no significant ankle edema        Brief Assessment/Plan :  See detailed assessment and plan developed with APC which I have reviewed and/or edited for completeness.        Admission Status: I believe that this patient meets inpatient status      Jack Flores MD  08/20/21

## 2021-08-19 NOTE — PROGRESS NOTES
Follow Up Office Visit      Patient Name: Shannan Jha  : 1970   MRN: [id number]     Chief Complaint:  Failure to thrive, metastatic endometrial cancer    History of Present Illness: Shannan Jha is a 51 y.o. female who is here today with multiple complaints.  Patient canceled infusion today due to significant weakness and fatigue.  On further questioning patient reports that she just continues to feel very exhausted.  She is barely walking at home and is mostly confined to the chair.  She is requiring a wheelchair.  She continues to have worsening pain in her right upper neck with pain that shoots into the back of her head.  This is also continued to worsen.  She now reports constant dizziness as well as black spots in her vision.  She has had 3 falls in the past week.  She believes that this is due to a combination of both weakness and balance problems.  She has not eaten anything in approximately 2 days.  Her  reports that she is having significant trouble swallowing is now choking when she drinks or eats.  When asked about the mass in her chest she reports that this might be a little bit bigger than it was previously.  She also reports now noticing fullness in her sternal region.  She has shortness of breath and dyspnea which is worsening.    Oncologic History:  Oncology/Hematology History   Endometrial cancer (CMS/HCC)   6/3/2021 Initial Diagnosis    Referred by Dr. Nancy Pearce    2021: TVUS with uterus measuring 3.4 x 6.8 x 6.4 cm with an endometrial stripe thickness of 3.2 mm.  Left and right ovaries normal.  2021: Biopsy of cervical polyp demonstrates carcinoma most consistent with endometrial adenocarcinoma     2021 Surgery    Robotic assisted radical tumor debulking with total laparoscopic hysterectomy for uterus weighing greater than 250 g, bilateral salpingo-oophorectomy, left ureterolysis, peritoneal stripping, lysis of adhesions, cystoscopy with left temporary  stent placement, and biopsy of clitoral mass.    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  Right fallopian tube and ovary negative.  Extensive LVSI noted.  Clitoral munoz mass with dedifferentiated carcinoma myometrium.    [facility] by Jolie Jensen MD       7/2/2021 Imaging    Stat CT scans ordered for enlarging chest wall mass noted at problem visit.    CT C/A/P with diffuse metastatic disease.  Parenchymal liver lesions suspicious for metastases noted.  Adenopathy noted in the left supraclavicular, left retropectoral, mediastinum, janice hepatis, retroperitoneum and pelvis.  Pulmonary nodules concerning for metastasis.  Lobulated soft tissue mass in the left upper pelvis most likely lymph node.  Soft tissue mass at peritoneum measuring 4.5 x 4.1 cm corresponding to the palpable clitoral mass.     7/2/2021 Cancer Staged    Staging form: Corpus Uteri - Carcinoma And Carcinosarcoma, AJCC 8th Edition  - Clinical stage from 7/2/2021: FIGO Stage IVB (cN2a, pM1) - Signed by Jolie Jensen MD on 7/6/2021     7/15/2021 -  Chemotherapy    OP UTERINE PACLitaxel / CARBOplatin (Q21D)          I have reviewed and the following portions of the patient's history were updated as appropriate: past family history, past medical history, past social history, past surgical history, past obstetrics/gynecologic history and problem list.    Medications: The current medication list was reviewed with the patient and updated in the EMR this date per the Medical Assistant. Medication dosages and frequencies were confirmed to be accurate.      Allergies:   Allergies   Allergen Reactions   • Codeine Nausea Only and Dizziness       Subjective      Review of Systems:   Review of Systems   Constitutional: Positive for activity change, appetite change and fatigue.   HENT: Positive for trouble  "swallowing.    Eyes: Positive for visual disturbance. Negative for photophobia.   Respiratory: Positive for choking and shortness of breath. Negative for wheezing.    Cardiovascular: Positive for leg swelling. Negative for chest pain.   Gastrointestinal: Positive for abdominal pain, constipation and nausea. Negative for vomiting.   Genitourinary: Negative for vaginal bleeding and vaginal discharge.   Musculoskeletal: Positive for arthralgias, gait problem, joint swelling and neck pain.   Skin: Positive for color change.   Neurological: Positive for dizziness, weakness and light-headedness.   Psychiatric/Behavioral: Positive for dysphoric mood. The patient is nervous/anxious.         Objective     Physical Exam:  Vital Signs:   Vitals:    08/19/21 1421   BP: 123/67   Pulse: 83   Resp: 14   Temp: 95.7 °F (35.4 °C)   TempSrc: Temporal   SpO2: 99%   Height: 175.3 cm (69.02\")   PainSc:   8     BMI: Body mass index is 29.82 kg/m².   ECOG score: 2-3    Karnofsky score: 80     PHQ-2 Depression Screening  Little interest or pleasure in doing things?     Feeling down, depressed, or hopeless?     PHQ-2 Total Score           Physical Exam  Vitals and nursing note reviewed.   Constitutional:       General: She is not in acute distress.     Appearance: She is ill-appearing. She is not toxic-appearing.   HENT:      Head: Normocephalic and atraumatic.      Right Ear: External ear normal.      Left Ear: External ear normal.   Eyes:      General: No scleral icterus.        Right eye: No discharge.         Left eye: No discharge.   Cardiovascular:      Rate and Rhythm: Normal rate and regular rhythm.      Heart sounds: No murmur heard.     Pulmonary:      Effort: Pulmonary effort is normal. No respiratory distress.      Breath sounds: Normal breath sounds.   Chest:      Chest wall: Mass (Left chest wall mass approximately 50% larger than previous. New fullness noted over sternum.) present.   Musculoskeletal:      Right lower leg: " Edema present.      Left lower leg: Edema present.      Comments: In wheelchair   Lymphadenopathy:      Cervical: Cervical adenopathy present.   Skin:     Coloration: Skin is pale.      Comments: Waxy pale complexion   Neurological:      Mental Status: Mental status is at baseline.       Labs:  Lab Results   Component Value Date    WBC 4.70 08/19/2021    HGB 6.4 (L) 08/19/2021    HCT 18.9 (L) 08/19/2021    MCV 80.7 08/19/2021     08/19/2021       Lab Results   Component Value Date    GLUCOSE 331 (H) 08/12/2021    CALCIUM 9.2 08/12/2021     (L) 08/12/2021    K 4.4 08/12/2021    CO2 22.0 08/12/2021    CL 85 (L) 08/12/2021    BUN 12 08/12/2021    CREATININE 0.42 (L) 08/12/2021    EGFRIFNONA >150 08/12/2021    BCR 28.6 (H) 08/12/2021    ANIONGAP 17.0 (H) 08/12/2021         Assessment / Plan    Shannan Jha is a 51 y.o. with widely metastatic dedifferentiated endometrial cancer who presents today with failure to thrive, worsening anemia, and new neurologic symptoms.  Of note she has only been able to receive 1 cycle of carboplatin/paclitaxel this was approximately 5 weeks ago.  Today we had an incredibly alison conversation regarding the patient's current performance status and prognosis.  We discussed that she is currently in no condition to do chemotherapy and that this was likely due to her rapidly progressing cancer.      At this point I really think we have 2 routes of management: 1) Admit the patient to the hospital for further work-up and evaluation.  This would involve repeat scans to assess disease status as well as an MRI of the brain due to her new neurologic symptoms.  I am very concerned about progression given her enlarging chest wall mass to her new difficulties with swallowing.  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  I discussed that with immunotherapy the side effects  are much different than cytotoxic chemotherapy and may be easier to tolerate. 2) Switch our focus of care from trying to prepare for the next cancer directed therapy to purely pursuing palliative care with a total focus on symptom control and improve the patient's quality of life.  The patient and her  are understandably overwhelmed by her discussion today.  At this point she is not ready to make decisions regarding long-term management.  She is amenable to admission to the hospital for evaluation and work-up.  I think that this is totally reasonable in order to make the patient feel better as well as to allow an opportunity for the patient to meet with pain and palliative.  In the event that a rapidly progressive cancer is diagnosed we will strongly recommend initiation of hospice therapy.    Failure to thrive: Multifactorial in nature most likely recommend patient's widely metastatic endometrial cancer.  See below for further details.  Patient to be admitted to telemetry bed for evaluation and work-up.    Anemia: Likely multifactorial including previous chemotherapy and anemia of chronic disease given her malnutrition and cancer.  She denies any vaginal bleeding and there is no evidence of internal bleeding at this time.  Hemoglobin today 6.4.  Of note she has received approximately 4 units of blood as an outpatient with continued low hemoglobin.  I do not expect that her bone marrow will be able to tolerate any additional cytotoxic chemotherapy.  We will plan for transfusion while inpatient.    Difficulty swallowing: This is preventing patient from eating and drinking.  I am very worried that this could be related to mechanical dysphagia due to enlarging hilar lymphadenopathy.  We should be able to assess this on chest CT for admission.  She may need a dysphagia diet ordered.    Neck pain, dizziness, vision changes: Symptoms certainly concerning for possible neurological involvement which would not be  unreasonable given the patient's aggressive histology.  We will plan for MRI of the brain as an inpatient to assess for this.    Cancer-related likely to pain: Patient currently on MS Contin and oxycodone for breakthrough.  He is also taking gabapentin, Tylenol, and ibuprofen.  I would like the patient to have a consult with pain and palliative as she unfortunately was unable to attend this appointment as an outpatient.    Anxiety/depression: Plan to continue patient's buspirone, mirtazapine, and Paxil.    Pain assessment was performed today as a part of patient’s care.  For patients with pain related to surgery, gynecologic malignancy or cancer treatment, the plan is as noted in the assessment/plan.  For patients with pain not related to these issues, they are to seek any further needed care from a more appropriate provider, such as PCP.      Diagnoses and all orders for this visit:    1. Endometrial cancer (CMS/HCC) (Primary)    2. Cancer-related pain    3. Anemia due to antineoplastic chemotherapy    4. Failure to thrive in adult    5. Dysphagia, unspecified type    6. Vision changes    7. Dizziness    8. Anxiety         Follow Up: Pending inpatient evaluation    MIL Jensen MD  Gynecologic Oncology     Please note that portions of this note may have been completed with a voice recognition program.

## 2021-08-20 NOTE — CONSULTS
Maricel Quinteros MD  Consulting physician: Halie    No chief complaint on file.      Reason for consult: [facility]    HPI: Patient is a 51-year-old female with a history of endometrial cancer. Patient brought in hospital due to overall decline and decreasing appetite. Patient has been having decreasing appetite for the last several days. Since coming hospital she has been found to have widely metastatic disease which is apparently progressing.    Pain assesment: Denies    Dyspnea: Denies    N/V: Denies    PPS: 40      Past Medical History:   Diagnosis Date   • Adenocarcinoma (CMS/HCC)     endometrial   • Anxiety    • Depression    • Diabetes (CMS/HCC)    • GERD (gastroesophageal reflux disease)    • Heart murmur    • Hyperlipidemia    • Hypertension    • Thyroid disease    • Uterine cancer (CMS/HCC)      Past Surgical History:   Procedure Laterality Date   • ENDOSCOPY     • GALLBLADDER SURGERY     • TOTAL LAPAROSCOPIC HYSTERECTOMY SALPINGO OOPHORECTOMY N/A 6/18/2021    Procedure: TOTAL LAPAROSCOPIC RADICAL TUMOR DEBULKING WITH TOTAL HYSTERECTOMY (UTERUS > 250 g), BILATERAL SALPINGO-OOPHORECTOMY, LEFT URETROLYSIS, PERTIONEAL STRIPPING, LYSIS OF ADHESIONS (>90 MINUTES), CYSTOSCOPY WITH LEFT TEMPORARY STENT PLACEMENT, AND INJECTION FOR SENTINEL LYMPH NODE DISSECTION;  Surgeon: Jolie Jensen MD;  [facility];  Service: Robotics - [employer];  Laterality: N/A   • WISDOM TOOTH EXTRACTION       Current Code Status     Date Active Code Status Order ID Comments User Context       8/20/2021 0747 No CPR [id number]  Trang Holbrook MD Inpatient     Advance Care Planning Activity      Questions for Current Code Status     Question Answer Comment    Code Status No CPR     Medical Interventions (Level of Support Prior to Arrest) Limited     Limited Support to NOT Include Intubation      Cardioversion/Defibrillation         Current Facility-Administered Medications   Medication Dose Route Frequency Provider Last Rate Last  Admin   • busPIRone (BUSPAR) tablet 5 mg  5 mg Oral TID Jack Flores MD       • castor oil-balsam peru (VENELEX) ointment   Topical Q12H Trang Holbrook MD   Given at 08/20/21 1226   • cefTRIAXone (ROCEPHIN) 1 g/100 mL 0.9% NS (MBP)  1 g Intravenous Q24H Varsha Sal APRN   1 g at 08/19/21 2342   • dexamethasone (DECADRON) injection 4 mg  4 mg Intravenous Q6H Jack Flores MD   4 mg at 08/20/21 1226   • dextrose (D50W) 25 g/ 50mL Intravenous Solution 25 g  25 g Intravenous Q15 Min PRN Jack Flores MD       • dextrose (GLUTOSE) oral gel 15 g  15 g Oral Q15 Min PRN Jack Flores MD       • docusate sodium (COLACE) capsule 200 mg  200 mg Oral Daily Perla Ambrose PA-C   200 mg at 08/19/21 1755   • gabapentin (NEURONTIN) capsule 300 mg  300 mg Oral TID Jack Flores MD       • glucagon (human recombinant) (GLUCAGEN DIAGNOSTIC) injection 1 mg  1 mg Subcutaneous Q15 Min PRN Jack Flores MD       • insulin lispro (humaLOG) injection 0-14 Units  0-14 Units Subcutaneous Q3H Jack Flores MD   8 Units at 08/20/21 1239   • levETIRAcetam in NaCl 0.75% (KEPPRA) IVPB 1,000 mg  1,000 mg Intravenous Q12H Jack Flores  mL/hr at 08/20/21 0538 1,000 mg at 08/20/21 0538   • levothyroxine (SYNTHROID, LEVOTHROID) tablet 250 mcg  250 mcg Oral Once per day on Mon Tue Wed Thu Fri Sat Perla Ambrose PA-C       • magnesium sulfate 4 gram infusion - Mg less than or equal to 1mg/dL  4 g Intravenous PRN Jack Flores MD        Or   • magnesium sulfate 3 gram infusion (1gm x 3) - Mg 1.1 - 1.5 mg/dL  1 g Intravenous PRN Jack Flores MD        Or   • Magnesium Sulfate 2 gram infusion- Mg 1.6 - 1.9 mg/dL  2 g Intravenous PRN Jack Flores MD       • megestrol (MEGACE) tablet 80 mg  80 mg Oral BID Perla Ambrose PA-C       • Morphine (MS CONTIN) 12 hr tablet 30 mg  30 mg Oral BID Perla Ambrose PA-C       • nystatin (MYCOSTATIN)  [id number] UNIT/ML suspension 500,000 Units  5 mL Swish & Swallow 4x Daily Perla Ambrose PA-C   500,000 Units at 08/20/21 1226   • ondansetron (ZOFRAN) tablet 4 mg  4 mg Oral Q6H PRN Perla Ambrose PA-C        Or   • ondansetron (ZOFRAN) injection 4 mg  4 mg Intravenous Q6H PRN Perla Ambrose PA-C   4 mg at 08/20/21 1226   • oxyCODONE (ROXICODONE) immediate release tablet 10 mg  10 mg Oral Q6H PRN Perla Ambrose PA-C   10 mg at 08/19/21 1755   • pantoprazole (PROTONIX) EC tablet 40 mg  40 mg Oral BID AC Perla Ambrose PA-C   40 mg at 08/19/21 1755   • PARoxetine (PAXIL) tablet 20 mg  20 mg Oral Daily Jack Flores MD       • polyethylene glycol (MIRALAX) packet 17 g  17 g Oral Daily Perla Ambrose PA-C   17 g at 08/19/21 1756   • senna (SENOKOT) tablet 2 tablet  2 tablet Oral Nightly Perla Ambrose PA-C       • sodium chloride 0.9 % flush 10 mL  10 mL Intravenous Q12H Perla Ambrose PA-C   10 mL at 08/20/21 0923   • sodium chloride 0.9 % flush 10 mL  10 mL Intravenous PRN Perla Ambrose PA-C       • sodium chloride 0.9 % infusion  75 mL/hr Intravenous Continuous Jack Flores MD 75 mL/hr at 08/20/21 1239 75 mL/hr at 08/20/21 1239     sodium chloride, 75 mL/hr, Last Rate: 75 mL/hr (08/20/21 1239)      dextrose  •  dextrose  •  glucagon (human recombinant)  •  magnesium sulfate **OR** magnesium sulfate in D5W 1g/100mL (PREMIX) **OR** magnesium sulfate  •  ondansetron **OR** ondansetron  •  oxyCODONE  •  sodium chloride  Allergies   Allergen Reactions   • Codeine Nausea Only and Dizziness     Family History   Problem Relation Age of Onset   • Diabetes Mother    • Heart attack Mother    • Heart disease Mother    • Stroke Mother    • Arthritis Father    • Cancer Father         Bladder   • Thyroid disease Father    • Diabetes Brother    • Thyroid disease Brother      Social History     Socioeconomic History   • Marital status:       Spouse name: Not on file   • Number of children: Not on file   • Years of education: Not on file   • Highest education level: Not on file   Tobacco Use   • Smoking status: Never Smoker   • Smokeless tobacco: Never Used   Vaping Use   • Vaping Use: Never used   Substance and Sexual Activity   • Alcohol use: Never   • Drug use: Never   • Sexual activity: Defer     Review of Systems -all others reviewed and found negative that mentioned in the HPI      /78 (BP Location: Left arm, Patient Position: Sitting)   Pulse 99   Temp 98 °F (36.7 °C) (Oral)   Resp 16   LMP 06/03/2021 Comment: mammogram is not up to date  SpO2 96%     Intake/Output Summary (Last 24 hours) at 8/20/2021 1255  Last data filed at 8/20/2021 0622  Gross per 24 hour   Intake 564 ml   Output 930 ml   Net -366 ml     Physical Exam:      General Appearance:    Alert, cooperative, in no acute distress   Head:    Normocephalic, without obvious abnormality, atraumatic   Eyes:            Lids and lashes normal, conjunctivae and sclerae normal, no   icterus, no pallor, corneas clear, PERRLA   Ears:    Ears appear intact with no abnormalities noted   Throat:   No oral lesions, no thrush, oral mucosa moist   Neck:   No adenopathy, supple, trachea midline, no thyromegaly, no     carotid bruit, no JVD   Back:     No kyphosis present, no scoliosis present, no skin lesions,       erythema or scars, no tenderness to percussion or                   palpation,   range of motion normal   Lungs:     Clear to auscultation,respirations regular, even and                   unlabored    Heart:    Regular rhythm and normal rate, normal S1 and S2, no            murmur, no gallop, no rub, no click   Breast Exam:    Deferred   Abdomen:     Normal bowel sounds, no masses, no organomegaly, soft        non-tender, non-distended, no guarding, no rebound                 tenderness   Genitalia:    Deferred   Extremities:   Moves all extremities well, no edema, no cyanosis, no               redness   Pulses:   Pulses palpable and equal bilaterally   Skin:   No bleeding, bruising or rash   Lymph nodes:   No palpable adenopathy   Neurologic:   Cranial nerves 2 - 12 grossly intact, sensation intact, DTR        present and equal bilaterally       Results from last 7 days   Lab Units 08/20/21  0439   WBC 10*3/mm3 3.72   HEMOGLOBIN g/dL 7.3*   HEMATOCRIT % 22.2*   PLATELETS 10*3/mm3 100*     Results from last 7 days   Lab Units 08/20/21  0824 08/19/21  2202 08/19/21  1749   SODIUM mmol/L 125*   < > 125*   POTASSIUM mmol/L 4.0   < > 4.4   CHLORIDE mmol/L 86*   < > 84*   CO2 mmol/L 26.0   < > 23.0   BUN mg/dL 9   < > 13   CREATININE mg/dL 0.36*   < > 0.49*   CALCIUM mg/dL 8.6   < > 9.4   BILIRUBIN mg/dL  --   --  1.4*   ALK PHOS U/L  --   --  330*   ALT (SGPT) U/L  --   --  22   AST (SGOT) U/L  --   --  86*   GLUCOSE mg/dL 223*   < > 362*    < > = values in this interval not displayed.     Results from last 7 days   Lab Units 08/20/21  0824   SODIUM mmol/L 125*   POTASSIUM mmol/L 4.0   CHLORIDE mmol/L 86*   CO2 mmol/L 26.0   BUN mg/dL 9   CREATININE mg/dL 0.36*   GLUCOSE mg/dL 223*   CALCIUM mg/dL 8.6     Imaging Results (Last 72 Hours)     Procedure Component Value Units Date/Time    FL Limited Ugi For Mbs Reflux Single-Contrast [[id number]] Resulted: 08/20/21 1116     Updated: 08/20/21 1136    FL Video Swallow With Speech Single Contrast [[id number]] Resulted: 08/20/21 1229     Updated: 08/20/21 1134    CT Angiogram Chest [[id number]] Collected: 08/19/21 2131     Updated: 08/19/21 2133    Narrative:      CT Abdomen Pelvis W, CTA Chest    INDICATION:   Mental status change, endometrial carcinoma, assess for progression    TECHNIQUE:   CTA of the chest. CT of the abdomen and pelvis without IV contrast. Coronal and sagittal reconstructions were obtained.  Radiation dose reduction techniques included automated exposure control or exposure modulation based on body size. Count of known CT  and cardiac nuc  med studies performed in previous 12 months: 0.     COMPARISON:   CT of the abdomen from 7/2/21    FINDINGS:  CTA chest: There is no definite evidence of pulmonary embolism although there is somewhat limitation of evaluation for small subsegmental embolus. The patient's left supraclavicular lymphadenopathy and left low cervical lymphadenopathy has significantly  increased in size and there is shift of the trachea to the right. Multilevel mediastinal nodes have significantly increased in size and there is significant narrowing of the eleonora and mainstem bronchi bilaterally. The esophageal lumen is also compressed  and may be significantly involved. There are new small bilateral pleural effusions, greater on the right as well as multiple new lung metastases noted bilaterally. Bone marrow attenuation is somewhat mottled and underlying metastatic disease cannot be  entirely excluded although no definite focal lesion is identified.    Abdomen: Multiple new metastatic lesions are noted throughout the liver. Lesions are noted within the bilateral adrenal glands, and the spleen is not well evaluated due to beam hardening artifact. However, there is suspicion for multiple splenic lesions  as well. Multiple large jeana masses are seen scattered throughout the abdomen. There is also flattening of the inferior vena cava secondary to jeana mass.    Pelvis: Malignant lesion abuts the wall of the rectum anteriorly. There is a small amount of fluid in the pelvis. Multiple abnormal aortocaval and periaortic nodes are noted. No acute bowel abnormality is appreciated. No acute osseous abnormality.      Impression:      1. There has been gross interval progression of metastatic disease as described above throughout the chest, abdomen and pelvis.  2. There is no definite evidence of pulmonary embolism although there is somewhat limitation of evaluation for small subsegmental embolus.          Signer Name: Martina Malin MD   Signed:  8/19/2021 9:31 PM   Workstation Name: [id number]    [facility]    CT Abdomen Pelvis With Contrast [[id number]] Collected: 08/19/21 2131     Updated: 08/19/21 2133    Narrative:      CT Abdomen Pelvis W, CTA Chest    INDICATION:   Mental status change, endometrial carcinoma, assess for progression    TECHNIQUE:   CTA of the chest. CT of the abdomen and pelvis without IV contrast. Coronal and sagittal reconstructions were obtained.  Radiation dose reduction techniques included automated exposure control or exposure modulation based on body size. Count of known CT  and cardiac nuc med studies performed in previous 12 months: 0.     COMPARISON:   CT of the abdomen from 7/2/21    FINDINGS:  CTA chest: There is no definite evidence of pulmonary embolism although there is somewhat limitation of evaluation for small subsegmental embolus. The patient's left supraclavicular lymphadenopathy and left low cervical lymphadenopathy has significantly  increased in size and there is shift of the trachea to the right. Multilevel mediastinal nodes have significantly increased in size and there is significant narrowing of the eleonora and mainstem bronchi bilaterally. The esophageal lumen is also compressed  and may be significantly involved. There are new small bilateral pleural effusions, greater on the right as well as multiple new lung metastases noted bilaterally. Bone marrow attenuation is somewhat mottled and underlying metastatic disease cannot be  entirely excluded although no definite focal lesion is identified.    Abdomen: Multiple new metastatic lesions are noted throughout the liver. Lesions are noted within the bilateral adrenal glands, and the spleen is not well evaluated due to beam hardening artifact. However, there is suspicion for multiple splenic lesions  as well. Multiple large jeana masses are seen scattered throughout the abdomen. There is also flattening of the inferior vena cava secondary  to jeana mass.    Pelvis: Malignant lesion abuts the wall of the rectum anteriorly. There is a small amount of fluid in the pelvis. Multiple abnormal aortocaval and periaortic nodes are noted. No acute bowel abnormality is appreciated. No acute osseous abnormality.      Impression:      1. There has been gross interval progression of metastatic disease as described above throughout the chest, abdomen and pelvis.  2. There is no definite evidence of pulmonary embolism although there is somewhat limitation of evaluation for small subsegmental embolus.          Signer Name: Martina Malin MD   Signed: 8/19/2021 9:31 PM   Workstation Name: [id number]    [facility]    CT Head With & Without Contrast [[id number]] Collected: 08/19/21 2121     Updated: 08/19/21 2123    Narrative:      HISTORY:   Mental status change, metastatic cancer    TECHNIQUE:   Axial head CT with and without IV contrast. Radiation dose reduction techniques included automated exposure control or exposure modulation based on body size. Count of known CT and cardiac nuc med studies performed in previous 12 months: 0.     COMPARISON:   None.    FINDINGS:   Prominently dural based lesion is seen abutting the left posterior cerebellar hemisphere measuring approximately 2 x 1.1 cm that demonstrates enhancement. There also appears to be some potential dural thickening at the posterior apex abutting the  posterior left frontal lobe, although this is somewhat equivocal. There is no definite large intraparenchymal lesion although subtle smaller lesions cannot be entirely excluded. There is no definite osseous abnormality. The sphenoid sinuses are  opacified.      Impression:      Overall, the findings are concerning for potential dural based metastases. The largest abuts the posterior left cerebellum and there is an equivocal second lesion at the left posterior vertex. In addition, small intraparenchymal metastases are not  excluded. Consider  follow-up MRI with contrast.    Signer Name: Martina Malin MD   Signed: 8/19/2021 9:21 PM   Workstation Name: [id number]    [facility]        Impression: Met cancer  Dysphagia  Neoplastic pain  GOC    Plan: Patient's CODE STATUS noted to be DNR which I agree with. I did not have any further goals of care discussions, as the patient states that she is waiting to talk to oncology. We will follow-up after she has been evaluated by oncology and have further discussions as needed. Continue patient on current symptomatic regimen as she seems to be very comfortable.        Lowell Kwok DO  08/20/21  12:55 EDT

## 2021-08-20 NOTE — DISCHARGE INSTR - DIET
MBS/VFSS   8/20/21  Reason for Referral  Patient was referred for a MBS to assess the efficiency of his/her swallow function, rule out aspiration and make recommendations regarding safe dietary consistencies, effective compensatory strategies, and safe eating environment.             Recommendations/Treatment  SLP Swallowing Diagnosis: functional oral phase, functional pharyngeal phase, other (see comments) (abnormal tissue noted in pharynx - MD aware, image provided)  Functional Impact: risk of aspiration/pneumonia  Rehab Potential/Prognosis, Swallowing: adequate, monitor progress closely  Swallow Criteria for Skilled Therapeutic Interventions Met: no problems identified which require skilled intervention  Therapy Frequency (Swallow): evaluation only  Predicted Duration Therapy Intervention (Days): until discharge  SLP Diet Recommendation: regular textures, thin liquids  Recommended Diagnostics: VFSS (MBS), other (see comments) (+ ltd upper GI)  Recommended Precautions and Strategies: upright posture during/after eating, small bites of food and sips of liquid, general aspiration precautions  SLP Rec. for Method of Medication Administration: meds whole, with thin liquids, with pudding or applesauce, as tolerated (crush PRN)  Monitor for Signs of Aspiration: yes, notify SLP if any concerns  Anticipated Discharge Disposition (SLP): unknown    Instrumental Set-up  Utensils Used: spoon, cup, straw  Consistencies Trialed: thin liquids, pudding thick, regular textures    Oral Preparation/ Oral Phase  Oral Prep Phase: WFL  Oral Transit Phase: WFL  Oral Residue: WFL             Pharyngeal Phase  Initiation of Pharyngeal Swallow: bolus in valleculae  Pharyngeal Phase: functional pharyngeal phase of swallowing  Pharyngeal Residue: thin liquids, pyriform sinuses  Response to Residue: cleared residue with spontaneous subsequent swallow  VFSS Summary: Grossly functional oropharyngeal swallow. Adequate airway protection - no  penetration/aspiration w/ any consistency tested. Mild pyriform residue w/ thin liquids that mostly cleared w/ spontaneous subsequent swallow. Per Radiology PA, no impairments noted on limited upper GI. Abnormal tissue noted in pharynx - discussed w/ MD & image provided in report. At this time, no further SLP intervention indicated. Will sign off.    Cervical Esophageal Phase

## 2021-08-20 NOTE — PLAN OF CARE
Problem: Palliative Care  Goal: Enhanced Quality of Life  Intervention: Promote Advance Care Planning  Flowsheets (Taken 8/20/2021 1600)  Life Transition/Adjustment:   palliative care discussed   palliative care initiated   decision-making facilitated     Problem: Palliative Care  Goal: Enhanced Quality of Life  Intervention: Optimize Psychosocial Wellbeing  Flowsheets (Taken 8/20/2021 1600)  Supportive Measures:   active listening utilized   goal setting facilitated   verbalization of feelings encouraged   Goal Outcome Evaluation:  Plan of Care Reviewed With: patient        Progress: no change  Outcome Summary: new palliative consult. Pt's cancer has progressed. Pt to meet with hospice tomorrow when  can be present. Pt states her support is her  and father. She is still in shock. Pt has occasional abd pain, occasional nausea, constipation. No bm in 3 or 4 days.  senna and miralax ordered. continue palliative support.Palliative Team meeting 1300, Lowell Kwok DO, Marguerite Rodriguez APRN, Areli Bravo RN, CHPN, Lynsey Rodrigues RN CHPN, Montse Swenson RN, SABAS,

## 2021-08-20 NOTE — PROGRESS NOTES
[facility] Medicine Services  PROGRESS NOTE    Patient Name: Shannan Jha  : 1970  MRN: [id number]    Date of Admission: 2021  Primary Care Physician: Maricel Quinteros MD    Subjective   Subjective     CC:  F/U FTT    HPI:  Patient seen this morning. Currently denies pain.     ROS:  CV-no chest pain, no palpitations  Resp-no cough, no dyspnea  GI-no N/V/D, no abd pain    All other systems reviewed and negative except any additional pertinent positives and negatives as discussed in HPI.       Objective   Objective     Vital Signs:   Temp:  [95.7 °F (35.4 °C)-100.1 °F (37.8 °C)] 98.5 °F (36.9 °C)  Heart Rate:  [] 91  Resp:  [14-18] 18  BP: (102-123)/(58-70) 102/58     Physical Exam:  Gen-no acute distress, pale appearance  HENT-NCAT, mucous membranes moist  CV-RRR, S1 S2 normal, no m/r/g  Resp-CTAB, no wheezes or rales  Abd-soft, NT, ND, +BS  Ext-+BLE edema  Neuro-awake, alert, answers simple questions, no focal deficits  Skin-no rashes  Psych-flat affect      Results Reviewed:  LAB RESULTS:      Lab 21  1418   WBC 3.72  --  4.28 4.70   HEMOGLOBIN 7.3*  --  6.4* 6.4*   HEMATOCRIT 22.2*  --  18.9* 18.9*   PLATELETS 100*  --  135* 177   NEUTROS ABS  --   --  3.03 3.80   IMMATURE GRANS (ABS)  --   --  0.43*  --    LYMPHS ABS  --   --  0.41* 0.60*   MONOS ABS  --   --  0.35 0.30   EOS ABS  --   --  0.05  --    MCV 84.7  --  78.1* 80.7   LACTATE  --  2.0  --   --          Lab 21  1749   SODIUM 125* 124* 125*   POTASSIUM 3.9 3.9 4.4   CHLORIDE 86* 85* 84*   CO2 20.0* 25.0 23.0   ANION GAP 19.0* 14.0 18.0*   BUN 10 11 13   CREATININE 0.35* 0.44* 0.49*   GLUCOSE 273* 285* 362*   CALCIUM 8.7 8.7 9.4   IONIZED CALCIUM  --   --  1.22   MAGNESIUM  --  1.8 1.9   PHOSPHORUS  --   --  3.7   HEMOGLOBIN A1C  --   --  7.80*   TSH 18.690*  --   --          Lab 21  1749   TOTAL PROTEIN 6.5    ALBUMIN 3.10*   GLOBULIN 3.4   ALT (SGPT) 22   AST (SGOT) 86*   BILIRUBIN 1.4*   ALK PHOS 330*                 Lab 08/19/21  1749   ABO TYPING O   RH TYPING Positive   ANTIBODY SCREEN Negative         Brief Urine Lab Results  (Last result in the past 365 days)      Color   Clarity   Blood   Leuk Est   Nitrite   Protein   CREAT   Urine HCG        08/20/21 0622 Yellow Clear Negative Trace Negative 30 mg/dL (1+)               Microbiology Results Abnormal     Procedure Component Value - Date/Time    COVID PRE-OP / PRE-PROCEDURE SCREENING ORDER (NO ISOLATION) - Swab, Nasopharynx [[id number]]  (Normal) Collected: 08/19/21 2015    Lab Status: Final result Specimen: Swab from Nasopharynx Updated: 08/19/21 2034    Narrative:      The following orders were created for panel order COVID PRE-OP / PRE-PROCEDURE SCREENING ORDER (NO ISOLATION) - Swab, Nasopharynx.  Procedure                               Abnormality         Status                     ---------                               -----------         ------                     COVID-19, ABBOTT IN-HOUS...[[id number]]  Normal              Final result                 Please view results for these tests on the individual orders.    COVID-19, ABBOTT IN-HOUSE,NASAL Swab (NO TRANSPORT MEDIA) 2 HR TAT - Swab, Nasopharynx [[id number]]  (Normal) Collected: 08/19/21 2015    Lab Status: Final result Specimen: Swab from Nasopharynx Updated: 08/19/21 2034     COVID19 Presumptive Negative    Narrative:      Fact sheet for providers: https://www.fda.gov/media/254852/download     Fact sheet for patients: https://www.fda.gov/media/672772/download    Test performed by PCR.  If inconsistent with clinical signs and symptoms patient should be tested with different authorized molecular test.          CT Head With & Without Contrast    Result Date: 8/19/2021  HISTORY: Mental status change, metastatic cancer TECHNIQUE: Axial head CT with and without IV contrast. Radiation dose reduction  techniques included automated exposure control or exposure modulation based on body size. Count of known CT and cardiac nuc med studies performed in previous 12 months: 0. COMPARISON: None. FINDINGS: Prominently dural based lesion is seen abutting the left posterior cerebellar hemisphere measuring approximately 2 x 1.1 cm that demonstrates enhancement. There also appears to be some potential dural thickening at the posterior apex abutting the posterior left frontal lobe, although this is somewhat equivocal. There is no definite large intraparenchymal lesion although subtle smaller lesions cannot be entirely excluded. There is no definite osseous abnormality. The sphenoid sinuses are opacified.     Impression: Overall, the findings are concerning for potential dural based metastases. The largest abuts the posterior left cerebellum and there is an equivocal second lesion at the left posterior vertex. In addition, small intraparenchymal metastases are not excluded. Consider follow-up MRI with contrast. Signer Name: Martina Malin MD  Signed: 8/19/2021 9:21 PM  Workstation Name: [id number]  [facility]    CT Abdomen Pelvis With Contrast    Result Date: 8/19/2021  CT Abdomen Pelvis W, CTA Chest INDICATION: Mental status change, endometrial carcinoma, assess for progression TECHNIQUE: CTA of the chest. CT of the abdomen and pelvis without IV contrast. Coronal and sagittal reconstructions were obtained.  Radiation dose reduction techniques included automated exposure control or exposure modulation based on body size. Count of known CT and cardiac nuc med studies performed in previous 12 months: 0. COMPARISON: CT of the abdomen from 7/2/21 FINDINGS: CTA chest: There is no definite evidence of pulmonary embolism although there is somewhat limitation of evaluation for small subsegmental embolus. The patient's left supraclavicular lymphadenopathy and left low cervical lymphadenopathy has significantly  increased in size and there is shift of the trachea to the right. Multilevel mediastinal nodes have significantly increased in size and there is significant narrowing of the eleonora and mainstem bronchi bilaterally. The esophageal lumen is also compressed and may be significantly involved. There are new small bilateral pleural effusions, greater on the right as well as multiple new lung metastases noted bilaterally. Bone marrow attenuation is somewhat mottled and underlying metastatic disease cannot be entirely excluded although no definite focal lesion is identified. Abdomen: Multiple new metastatic lesions are noted throughout the liver. Lesions are noted within the bilateral adrenal glands, and the spleen is not well evaluated due to beam hardening artifact. However, there is suspicion for multiple splenic lesions as well. Multiple large jeana masses are seen scattered throughout the abdomen. There is also flattening of the inferior vena cava secondary to jeana mass. Pelvis: Malignant lesion abuts the wall of the rectum anteriorly. There is a small amount of fluid in the pelvis. Multiple abnormal aortocaval and periaortic nodes are noted. No acute bowel abnormality is appreciated. No acute osseous abnormality.     Impression: 1. There has been gross interval progression of metastatic disease as described above throughout the chest, abdomen and pelvis. 2. There is no definite evidence of pulmonary embolism although there is somewhat limitation of evaluation for small subsegmental embolus. Signer Name: Martina Malin MD  Signed: 8/19/2021 9:31 PM  Workstation Name: [id number]  [facility] [place]    CT Angiogram Chest    Result Date: 8/19/2021  CT Abdomen Pelvis W, CTA Chest INDICATION: Mental status change, endometrial carcinoma, assess for progression TECHNIQUE: CTA of the chest. CT of the abdomen and pelvis without IV contrast. Coronal and sagittal reconstructions were obtained.  Radiation dose  reduction techniques included automated exposure control or exposure modulation based on body size. Count of known CT and cardiac nuc med studies performed in previous 12 months: 0. COMPARISON: CT of the abdomen from 7/2/21 FINDINGS: CTA chest: There is no definite evidence of pulmonary embolism although there is somewhat limitation of evaluation for small subsegmental embolus. The patient's left supraclavicular lymphadenopathy and left low cervical lymphadenopathy has significantly increased in size and there is shift of the trachea to the right. Multilevel mediastinal nodes have significantly increased in size and there is significant narrowing of the eleonora and mainstem bronchi bilaterally. The esophageal lumen is also compressed and may be significantly involved. There are new small bilateral pleural effusions, greater on the right as well as multiple new lung metastases noted bilaterally. Bone marrow attenuation is somewhat mottled and underlying metastatic disease cannot be entirely excluded although no definite focal lesion is identified. Abdomen: Multiple new metastatic lesions are noted throughout the liver. Lesions are noted within the bilateral adrenal glands, and the spleen is not well evaluated due to beam hardening artifact. However, there is suspicion for multiple splenic lesions as well. Multiple large jeana masses are seen scattered throughout the abdomen. There is also flattening of the inferior vena cava secondary to jeana mass. Pelvis: Malignant lesion abuts the wall of the rectum anteriorly. There is a small amount of fluid in the pelvis. Multiple abnormal aortocaval and periaortic nodes are noted. No acute bowel abnormality is appreciated. No acute osseous abnormality.     Impression: 1. There has been gross interval progression of metastatic disease as described above throughout the chest, abdomen and pelvis. 2. There is no definite evidence of pulmonary embolism although there is somewhat  limitation of evaluation for small subsegmental embolus. Signer Name: Martina Malin MD  Signed: 8/19/2021 9:31 PM  Workstation Name: [id number]  [facility] [place]          I have reviewed the medications:  Scheduled Meds:busPIRone, 5 mg, Oral, TID  cefTRIAXone, 1 g, Intravenous, Q24H  dexamethasone, 4 mg, Intravenous, Q6H  docusate sodium, 200 mg, Oral, Daily  gabapentin, 300 mg, Oral, TID  insulin lispro, 0-14 Units, Subcutaneous, Q3H  levETIRAcetam, 1,000 mg, Intravenous, Q12H  levothyroxine, 250 mcg, Oral, Once per day on Mon Tue Wed Thu Fri Sat  megestrol, 80 mg, Oral, BID  Morphine, 30 mg, Oral, BID  nystatin, 5 mL, Swish & Swallow, 4x Daily  pantoprazole, 40 mg, Oral, BID AC  PARoxetine, 20 mg, Oral, Daily  polyethylene glycol, 17 g, Oral, Daily  senna, 2 tablet, Oral, Nightly  sodium chloride, 10 mL, Intravenous, Q12H      Continuous Infusions:sodium chloride, 75 mL/hr, Last Rate: 75 mL/hr (08/19/21 1941)      PRN Meds:.dextrose  •  dextrose  •  glucagon (human recombinant)  •  magnesium sulfate **OR** magnesium sulfate in D5W 1g/100mL (PREMIX) **OR** magnesium sulfate  •  ondansetron **OR** ondansetron  •  oxyCODONE  •  sodium chloride    Assessment/Plan   Assessment & Plan     Active Hospital Problems    Diagnosis  POA   • Acute anemia [D64.9]  Yes   • Generalized weakness [R53.1]  Yes   • Endometrial cancer, FIGO stage IVB (CMS/HCC) [C54.1]  Yes   • Drug induced constipation [K59.03]  Yes   • Cancer related pain [G89.3]  Yes   • Dehydration [E86.0]  Yes   • Type 2 diabetes mellitus (CMS/HCC) [E11.9]  Yes   • Endometrial cancer (CMS/HCC) [C54.1]  Yes      Resolved Hospital Problems   No resolved problems to display.        Brief Hospital Course to date:  Shannan Jha is a 51 y.o. female with hx of HTN, HLD, DM2, hypothyroidism, and recently diagnosed metastatic endometrial cancer in May 2021, undergoing treatment with Dr. Jensen with Paclitaxel/Carboplatin, who presents from Dr. Jensen's  office due to worsening weakness, decreased oral intake, pain, and lab work showing worsening anemia. Admitted to hospitalist service. Further lab work revealed hyponatremia with Na 125 along with UTI. CT head/chest/abd/pelvis revealed new brain metastases as well as interval progression of metastatic disease throughout the chest, abdomen, and pelvis.    *All problems are new to me today.    Metastatic endometrial cancer, rapidly progressive  Encephalopathy/confusion  Malignancy-related pain  --CT scans suggesting new brain mets, lung mets, adenopathy with mainstem bronchi and tracheal displacement, diffuse liver disease, and adenopathy displacing the inferior vena cava.  --Patient refused MRI brain.  --Continue empiric Keppra.  --Continue Decadron.  --Dr. Jensen consulted. Has discussed with patient and family. They would like to go home with hospice.  --Palliative Care and Hospice consulted.    Anemia  --Likely related to chemotherapy.  --Transfuse 2 units PRBC.    Hyponatremia  --Likely secondary to volume depletion/poor oral intake.  --Gentle IV fluids.  --Monitor closely.     UTI  --Continue Rocephin.  --F/U urine culture.    DM2 with hyperglycemia  --HbA1c 7.8%.    Thrush  --Nystatin swish and swallow.    DVT prophylaxis:  Mechanical DVT prophylaxis orders are present.            Disposition: I expect the patient to be discharged home with hospice possibly over the weekend?    CODE STATUS:   Code Status and Medical Interventions:   Ordered at: 08/20/21 0753     Limited Support to NOT Include:    Intubation    Cardioversion/Defibrillation     Code Status:    No CPR     Medical Interventions (Level of Support Prior to Arrest):    Limited       Trang Holbrook MD  08/20/21

## 2021-08-20 NOTE — THERAPY EVALUATION
Acute Care - Speech Language Pathology   Swallow Initial Evaluation [facility]   Clinical Swallow Evaluation     Patient Name: Shannan Jha  : 1970  MRN: [id number]  Today's Date: 2021               Admit Date: 2021    Visit Dx:   No diagnosis found.  Patient Active Problem List   Diagnosis   • Endometrial cancer (CMS/HCC)   • Type 2 diabetes mellitus (CMS/HCC)   • Dehydration   • Drug induced constipation   • Cancer related pain   • Acute anemia   • Generalized weakness   • Endometrial cancer, FIGO stage IVB (CMS/HCC)     Past Medical History:   Diagnosis Date   • Adenocarcinoma (CMS/HCC)     endometrial   • Anxiety    • Depression    • Diabetes (CMS/HCC)    • GERD (gastroesophageal reflux disease)    • Heart murmur    • Hyperlipidemia    • Hypertension    • Thyroid disease    • Uterine cancer (CMS/HCC)      Past Surgical History:   Procedure Laterality Date   • ENDOSCOPY     • GALLBLADDER SURGERY     • TOTAL LAPAROSCOPIC HYSTERECTOMY SALPINGO OOPHORECTOMY N/A 2021    Procedure: TOTAL LAPAROSCOPIC RADICAL TUMOR DEBULKING WITH TOTAL HYSTERECTOMY (UTERUS > 250 g), BILATERAL SALPINGO-OOPHORECTOMY, LEFT URETROLYSIS, PERTIONEAL STRIPPING, LYSIS OF ADHESIONS (>90 MINUTES), CYSTOSCOPY WITH LEFT TEMPORARY STENT PLACEMENT, AND INJECTION FOR SENTINEL LYMPH NODE DISSECTION;  Surgeon: Jolie Jensen MD;  Location: [facility];  Service: [employer];  Laterality: N/A   • WISDOM TOOTH EXTRACTION         SLP Recommendation and Plan  SLP Swallowing Diagnosis: suspected pharyngeal dysphagia  SLP Diet Recommendation: other (see comments) (continue MD-ordered diet until MBS)  Recommended Precautions and Strategies: upright posture during/after eating, small bites of food and sips of liquid, general aspiration precautions  SLP Rec. for Method of Medication Administration: meds whole, meds crushed, with thin liquids, with pudding or applesauce, as tolerated     Monitor for Signs of Aspiration: yes,  notify SLP if any concerns  Recommended Diagnostics: VFSS (MBS), other (see comments) (+ ltd upper GI)  Swallow Criteria for Skilled Therapeutic Interventions Met: demonstrates skilled criteria  Anticipated Discharge Disposition (SLP): unknown, anticipate therapy at next level of care  Rehab Potential/Prognosis, Swallowing: adequate, monitor progress closely     Predicted Duration Therapy Intervention (Days): until discharge                         Plan of Care Reviewed With: patient         SWALLOW EVALUATION (last 72 hours)      SLP Adult Swallow Evaluation     Row Name 08/20/21 3635                   Rehab Evaluation    Document Type  evaluation  -MP        Subjective Information  no complaints  -MP        Patient Observations  alert;cooperative  -MP        Patient/Family/Caregiver Comments/Observations  S/o present  -MP        Care Plan Review  evaluation/treatment results reviewed;patient/other agree to care plan  -MP        Patient Effort  good  -MP           General Information    Patient Profile Reviewed  yes  -MP        Pertinent History Of Current Problem  Adm 8/19 w/ incr'd wkns, poor PO - direct from Dr. Jensen's office. Recent dx of endometrial carcinoma in May. Now metastatic, including mets to brain, mets shifting trachea to R, narrowinng bronchi, and causing esophageal lumen compression. Has had difficulty swallowing, frequent choking.   -MP        Current Method of Nutrition  soft textures;thin liquids  -MP        Precautions/Limitations, Vision  WFL;for purposes of eval  -MP        Precautions/Limitations, Hearing  WFL;for purposes of eval  -MP        Prior Level of Function-Communication  WFL  -MP        Prior Level of Function-Swallowing  other (see comments) incr'd difficulty swallowing over past few weeks  -MP        Plans/Goals Discussed with  patient;family;agreed upon  -MP        Barriers to Rehab  medically complex  -MP        Patient's Goals for Discharge  patient did not state  -MP         Family Goals for Discharge  family did not state  -MP           Pain    Additional Documentation  Pain Scale: FACES Pre/Post-Treatment (Group)  -MP           Pain Scale: FACES Pre/Post-Treatment    Pain: FACES Scale, Pretreatment  2-->hurts little bit  -MP        Posttreatment Pain Rating  2-->hurts little bit  -MP           Oral Motor Structure and Function    Dentition Assessment  natural, present and adequate  -MP        Secretion Management  WNL/WFL  -MP        Mucosal Quality  moist, healthy  -MP           Oral Musculature and Cranial Nerve Assessment    Oral Motor General Assessment  WFL  -MP           General Eating/Swallowing Observations    Respiratory Support Currently in Use  room air  -MP        Eating/Swallowing Skills  self-fed;fed by SLP  -MP        Positioning During Eating  upright in bed  -MP        Utensils Used  spoon;cup;straw  -MP        Consistencies Trialed  thin liquids;pureed  -MP           Clinical Swallow Eval    Pharyngeal Phase  suspected pharyngeal impairment  -MP        Esophageal Phase  suspected esophageal impairment  -MP        Clinical Swallow Evaluation Summary  Belching & cough following thin liquids. Multiple swallows w/ puree. Discussed options - pt wishing to pursue MBS + ltd upper GI @ this time to further assess/determine safest PO diet option.  -MP           Pharyngeal Phase Concerns    Pharyngeal Phase Concerns  cough;multiple swallows  -MP        Multiple Swallows  pudding  -MP        Cough  thin  -MP           Esophageal Phase Concerns    Esophageal Phase Concerns  belching  -MP        Belching  thin  -MP           Clinical Impression    SLP Swallowing Diagnosis  suspected pharyngeal dysphagia  -MP        Functional Impact  risk of aspiration/pneumonia  -MP        Rehab Potential/Prognosis, Swallowing  adequate, monitor progress closely  -MP        Swallow Criteria for Skilled Therapeutic Interventions Met  demonstrates skilled criteria  -MP           Recommendations     Predicted Duration Therapy Intervention (Days)  until discharge  -MP        SLP Diet Recommendation  other (see comments) continue MD-ordered diet until MBS  -MP        Recommended Diagnostics  VFSS (MBS);other (see comments) + ltd upper GI  -MP        Recommended Precautions and Strategies  upright posture during/after eating;small bites of food and sips of liquid;general aspiration precautions  -MP        Oral Care Recommendations  Oral Care BID/PRN  -MP        SLP Rec. for Method of Medication Administration  meds whole;meds crushed;with thin liquids;with pudding or applesauce;as tolerated  -MP        Monitor for Signs of Aspiration  yes;notify SLP if any concerns  -MP        Anticipated Discharge Disposition (SLP)  unknown;anticipate therapy at next level of care  -MP          User Key  (r) = Recorded By, (t) = Taken By, (c) = Cosigned By    Initials Name Effective Dates    Braxton Carr MS CCC-SLP 06/16/21 -           EDUCATION  The patient has been educated in the following areas:   Dysphagia (Swallowing Impairment).              Time Calculation:   Time Calculation- SLP     Row Name 08/20/21 1031             Time Calculation- SLP    SLP Start Time  0930  -MP      SLP Received On  08/20/21  -         Untimed Charges    98089-AL Eval Oral Pharyng Swallow Minutes  40  -MP         Total Minutes    Untimed Charges Total Minutes  40  -MP       Total Minutes  40  -MP        User Key  (r) = Recorded By, (t) = Taken By, (c) = Cosigned By    Initials Name Provider Type    Braxton Carr MS CCC-SLP Speech and Language Pathologist          Therapy Charges for Today     Code Description Service Date Service Provider Modifiers Qty    [id number] HC ST EVAL ORAL PHARYNG SWALLOW 3 8/20/2021 Braxton Grove MS CCC-KINGA GN 1               MS AIME Dunn  8/20/2021

## 2021-08-20 NOTE — PROGRESS NOTES
Clinical Nutrition   Reason For Visit: Malnutrition Severity Assessment    Patient Name: Shannan Jha  YOB: 1970  MRN: [id number]  Date of Encounter: 08/20/21 15:05 EDT  Admission date: 8/19/2021      Pt meets criteria for acute severe malnutrition; see 8/20 malnutrition note      Nutrition Assessment     Admission Problem List:    Met Uterine/Endometrial cancer (CMS/HCC)    Type 2 diabetes mellitus (CMS/HCC)    Dehydration    Drug induced constipation    Cancer related pain    Acute anemia    Generalized weakness   Thrush      PMH: She  has a past medical history of Adenocarcinoma (CMS/HCC), Anxiety, Depression, Diabetes (CMS/HCC), GERD (gastroesophageal reflux disease), Heart murmur, Hyperlipidemia, Hypertension, Thyroid disease, and Uterine cancer (CMS/HCC).   PSxH: She  has a past surgical history that includes Gallbladder surgery; Stevenson tooth extraction; Esophagogastroduodenoscopy; and total laparoscopic hysterectomy salpingo oophorectomy (N/A, 6/18/2021).        Reported/Observed/Food/Nutrition Related History     Pt states she has been eating less than 1/2 of usual po intake during the past 2 months, and reports poor appetite and reports some n/v as well.   Noted results of MBS today, and recs for regular texture foods with thin liquids.      Anthropometrics   Height: 69in  Weight: 202lb on 8/12 @ outpt visit.  BMI: 29.8  BMI classification: Overweight: 25.0-29.9kg/m2    IBW: 145lb    UBW:   Weight change: Pt's wt was 229lb on 6/16/21 (standing wt) per EMR, and pt  Verbally verifies. Pt with 27lb/11.8% unintentional wt change/loss over the past 2 months prior to adm.        Nutrition Focused Physical Exam     See malnutrition note 8/20      Labs reviewed   Labs reviewed: Yes    Results from last 7 days   Lab Units 08/20/21  1243 08/20/21  0824 08/20/21  0439 08/19/21  2202 08/19/21  2202 08/19/21  1749 08/19/21  1749   SODIUM mmol/L 123* 125* 125*   < > 124*   < > 125*   POTASSIUM mmol/L 4.3  4.0 3.9   < > 3.9   < > 4.4   CHLORIDE mmol/L 86* 86* 86*   < > 85*   < > 84*   CO2 mmol/L 25.0 26.0 20.0*   < > 25.0   < > 23.0   BUN mg/dL 10 9 10   < > 11   < > 13   CREATININE mg/dL 0.37* 0.36* 0.35*   < > 0.44*   < > 0.49*   GLUCOSE mg/dL 246* 223* 273*   < > 285*   < > 362*   CALCIUM mg/dL 8.7 8.6 8.7   < > 8.7   < > 9.4   PHOSPHORUS mg/dL  --   --   --   --   --   --  3.7   MAGNESIUM mg/dL  --   --   --   --  1.8  --  1.9    < > = values in this interval not displayed.     Results from last 7 days   Lab Units 08/20/21  0439 08/19/21  1749 08/19/21  1418   WBC 10*3/mm3 3.72 4.28 4.70   ALBUMIN g/dL  --  3.10*  --      Results from last 7 days   Lab Units 08/20/21  1230 08/20/21  0857 08/20/21  0532 08/20/21  0324 08/19/21  2153 08/19/21  1757   GLUCOSE mg/dL 265* 235* 297* 313* 293* 359*     Lab Results   Lab Value Date/Time    HGBA1C 7.80 (H) 08/19/2021 1749    HGBA1C 8.40 (H) 08/05/2021 0935     Medications reviewed   Medications reviewed: Yes        Needs Assessment   Height used:   Weight used:   IBW:     Estimated Calories needs:       Estimated Protein needs:       Estimated Fluid needs:       Current Nutrition Prescription   PO: Diet Soft Texture; Whole Foods; Consistent Carbohydrate     Average PO intake: insuffic data          Nutrition Diagnosis     Problem Malnutrition    Etiology Poor appetite   Signs/Symptoms 27lb/11.8% unintentional wt loss/change during the past 2 months                       Intervention   Intervention: Follow treatment progress, Interview for preferences, Encourage intake, Supplement provided    Will send Boost plus 2x/d; flavor pref obtained from pt.   Goal:   General: Nutrition to support treatment  PO: Establish PO         Monitoring/Evaluation:   Monitoring/Evaluation: Per protocol    Alycia Diamond, MS,RD,LD  Time Spent: 50 mins

## 2021-08-20 NOTE — PROGRESS NOTES
Malnutrition Severity Assessment    Patient Name:  Shannan Jha  YOB: 1970  MRN: [id number]  Admit Date:  8/19/2021    Patient meets criteria for : Severe Malnutrition (based on energy intake and wt status)      Malnutrition Severity Assessment  Malnutrition Type: Acute Disease or Injury - Related Malnutrition     Malnutrition Type (last 8 hours)      Malnutrition Severity Assessment     Row Name 08/20/21 1453       Malnutrition Severity Assessment    Malnutrition Type  Acute Disease or Injury - Related Malnutrition    Row Name 08/20/21 1453       Insufficient Energy Intake     Insufficient Energy Intake Findings  Severe    Insufficient Energy Intake   < or equal to 50% of est. energy requirement for > or equal to 5d   pt states she has been eating less than 1/2 of usual po intake during the past 2 months, and reports poor appetite.    Row Name 08/20/21 1453       Unintentional Weight Loss     Unintentional Weight Loss Findings  Severe    Unintentional Weight Loss   -- pt with 27lb/11.8% unintentional wt change/loss over the past 2 months prior to adm.    Row Name 08/20/21 1453       Muscle Loss    Loss of Muscle Mass Findings  -- RD performed partial NFPE, but not able to assess all areas d/t potential for pt discomfort. PT did not wish to lean forward//move around much during exam and RD did persist.  RD able to assess non-severe muscle loss at quadriceps.    Row Name 08/20/21 1453       Fat Loss    Subcutaneous Fat Loss Findings  -- RD was only able to perform partial NFPE; pt with significant edema. Not able to define/dx fat losses at this time.    Row Name 08/20/21 1453       Criteria Met (Must meet criteria for severity in at least 2 of these categories: M Wasting, Fat Loss, Fluid, Secondary Signs, Wt. Status, Intake)    Patient meets criteria for   Severe Malnutrition based on energy intake and wt status          Electronically signed by:  Alycia Diamond, MS,RD,LD  08/20/21 15:01 EDT

## 2021-08-20 NOTE — CASE MANAGEMENT/SOCIAL WORK
Discharge Planning Assessment  [facility]     Patient Name: Shannan Jha  MRN: [id number]  Today's Date: 8/20/2021    Admit Date: 8/19/2021    Discharge Needs Assessment     Row Name 08/20/21 1702       Living Environment    Lives With  spouse    Name(s) of Who Lives With Patient  Stephen(spouse)    Current Living Arrangements  home/apartment/condo    Primary Care Provided by  self;spouse/significant other    Provides Primary Care For  no one, unable/limited ability to care for self    Family Caregiver if Needed  spouse    Quality of Family Relationships  helpful;involved;supportive    Able to Return to Prior Arrangements  yes    Living Arrangement Comments  CM met with pt and pt's spouse at bedside. Pt resides in [facility] with spouse, Stephen.       Transition Planning    Patient/Family Anticipates Transition to  home with family    Patient/Family Anticipated Services at Transition      Transportation Anticipated  family or friend will provide       Discharge Needs Assessment    Readmission Within the Last 30 Days  no previous admission in last 30 days    Current Outpatient/Agency/Support Group  other (see comments);support group(s) TBD    Equipment Currently Used at Home  wheelchair    Concerns to be Addressed  discharge planning;adjustment to diagnosis/illness    Equipment Needed After Discharge  wheelchair, manual    Discharge Coordination/Progress  Pt reports she has Incont with prescription coverage. Pt uses [facility] in [place]. Pt has history of endometrial cancer and admitted to hospital due to decline. Pt has palliative and hospice consult. Discharge plan TBD/evolving. . CM will cont to follow.        Discharge Plan     Row Name 08/20/21 4666       Plan    Plan  discharge plan    Plan Comments  Palliative following Discharge plan TBD/evolving. CM will cont to follow.    Final Discharge Disposition Code  30 - still a patient    Row Name 08/20/21 4030       Plan    Plan  To  be determined    Plan Comments  Hospice referral received, chart reviewed. Visit made to pt, pt stated is aware of the hospice referral. Pt stated  will not be at the hospital until this everning, though is open to hearing about hospice now and will share information with . Teaching done on hospice philosophy, services, and goals of care. Provided pt with hospice brochure. Plan made for the hospice nurse to make a visit tomorrow when  is present to answer questions and address concerns. Please call [phone] if can be of further assistance.        Continued Care and Services - Admitted Since 8/19/2021    Coordination has not been started for this encounter.       Expected Discharge Date and Time     Expected Discharge Date Expected Discharge Time    Aug 24, 2021         Demographic Summary     Row Name 08/20/21 1701       General Information    General Information Comments  Pt's PCP is Maricel Quinteros       Contact Information    Permission Granted to Share Info With      Contact Information Obtained for      Contact Information Comments  Stephen Jha(spouse):  [phone] or [phone]        Functional Status    No documentation.       Psychosocial    No documentation.       Abuse/Neglect    No documentation.       Legal    No documentation.       Substance Abuse    No documentation.       Patient Forms    No documentation.           Maxine Mccoy RN

## 2021-08-20 NOTE — MBS/VFSS/FEES
Acute Care - Speech Language Pathology   Swallow Initial Evaluation [facility]   Modified Barium Swallow Study (MBS)     Patient Name: Shannan Jha  : 1970  MRN: [id number]  Today's Date: 2021               Admit Date: 2021               Visit Dx:   No diagnosis found.  Patient Active Problem List   Diagnosis   • Endometrial cancer (CMS/HCC)   • Type 2 diabetes mellitus (CMS/HCC)   • Dehydration   • Drug induced constipation   • Cancer related pain   • Acute anemia   • Generalized weakness   • Endometrial cancer, FIGO stage IVB (CMS/HCC)     Past Medical History:   Diagnosis Date   • Adenocarcinoma (CMS/HCC)     endometrial   • Anxiety    • Depression    • Diabetes (CMS/HCC)    • GERD (gastroesophageal reflux disease)    • Heart murmur    • Hyperlipidemia    • Hypertension    • Thyroid disease    • Uterine cancer (CMS/HCC)      Past Surgical History:   Procedure Laterality Date   • ENDOSCOPY     • GALLBLADDER SURGERY     • TOTAL LAPAROSCOPIC HYSTERECTOMY SALPINGO OOPHORECTOMY N/A 2021    Procedure: TOTAL LAPAROSCOPIC RADICAL TUMOR DEBULKING WITH TOTAL HYSTERECTOMY (UTERUS > 250 g), BILATERAL SALPINGO-OOPHORECTOMY, LEFT URETROLYSIS, PERTIONEAL STRIPPING, LYSIS OF ADHESIONS (>90 MINUTES), CYSTOSCOPY WITH LEFT TEMPORARY STENT PLACEMENT, AND INJECTION FOR SENTINEL LYMPH NODE DISSECTION;  Surgeon: Jolie Jensen MD;  Location: [facility];  Service: [employer] - [employer];  Laterality: N/A   • WISDOM TOOTH EXTRACTION         SLP Recommendation and Plan  SLP Swallowing Diagnosis: functional oral phase, functional pharyngeal phase, other (see comments) (abnormal tissue noted in pharynx - MD aware, image provided)  SLP Diet Recommendation: regular textures, thin liquids  Recommended Precautions and Strategies: upright posture during/after eating, small bites of food and sips of liquid, general aspiration precautions  SLP Rec. for Method of Medication Administration: meds whole, with thin liquids, with  pudding or applesauce, as tolerated (crush PRN)     Monitor for Signs of Aspiration: yes, notify SLP if any concerns  Recommended Diagnostics: VFSS (MBS), other (see comments) (+ ltd upper GI)  Swallow Criteria for Skilled Therapeutic Interventions Met: no problems identified which require skilled intervention  Anticipated Discharge Disposition (SLP): unknown  Rehab Potential/Prognosis, Swallowing: adequate, monitor progress closely  Therapy Frequency (Swallow): evaluation only  Predicted Duration Therapy Intervention (Days): until discharge                         Plan of Care Reviewed With: patient         SWALLOW EVALUATION (last 72 hours)      SLP Adult Swallow Evaluation     Row Name 08/20/21 1130 08/20/21 8527                Rehab Evaluation    Document Type  evaluation  -MP  evaluation  -MP       Subjective Information  no complaints  -MP  no complaints  -MP       Patient Observations  alert;cooperative  -MP  alert;cooperative  -MP       Patient/Family/Caregiver Comments/Observations  No family present  -MP  S/o present  -MP       Care Plan Review  evaluation/treatment results reviewed;patient/other agree to care plan  -MP  evaluation/treatment results reviewed;patient/other agree to care plan  -MP       Patient Effort  good  -MP  good  -MP          General Information    Patient Profile Reviewed  yes  -MP  yes  -MP       Pertinent History Of Current Problem  See AM eval   -MP  Adm 8/19 w/ incr'd wkns, poor PO - direct from Dr. Jensen's office. Recent dx of endometrial carcinoma in May. Now metastatic, including mets to brain, mets shifting trachea to R, narrowinng bronchi, and causing esophageal lumen compression. Has had difficulty swallowing, frequent choking.   -MP       Current Method of Nutrition  --  soft textures;thin liquids  -MP       Precautions/Limitations, Vision  --  WFL;for purposes of eval  -MP       Precautions/Limitations, Hearing  --  WFL;for purposes of eval  -MP       Prior Level of  Function-Communication  --  WFL  -MP       Prior Level of Function-Swallowing  --  other (see comments) incr'd difficulty swallowing over past few weeks  -MP       Plans/Goals Discussed with  --  patient;family;agreed upon  -MP       Barriers to Rehab  --  medically complex  -MP       Patient's Goals for Discharge  --  patient did not state  -MP       Family Goals for Discharge  --  family did not state  -MP          Pain    Additional Documentation  Pain Scale: FACES Pre/Post-Treatment (Group)  -MP  Pain Scale: FACES Pre/Post-Treatment (Group)  -MP          Pain Scale: FACES Pre/Post-Treatment    Pain: FACES Scale, Pretreatment  2-->hurts little bit  -MP  2-->hurts little bit  -MP       Posttreatment Pain Rating  2-->hurts little bit  -MP  2-->hurts little bit  -MP          Oral Motor Structure and Function    Dentition Assessment  --  natural, present and adequate  -MP       Secretion Management  --  WNL/WFL  -MP       Mucosal Quality  --  moist, healthy  -MP          Oral Musculature and Cranial Nerve Assessment    Oral Motor General Assessment  --  WFL  -MP          General Eating/Swallowing Observations    Respiratory Support Currently in Use  --  room air  -MP       Eating/Swallowing Skills  --  self-fed;fed by SLP  -MP       Positioning During Eating  --  upright in bed  -MP       Utensils Used  --  spoon;cup;straw  -MP       Consistencies Trialed  --  thin liquids;pureed  -MP          Clinical Swallow Eval    Pharyngeal Phase  --  suspected pharyngeal impairment  -MP       Esophageal Phase  --  suspected esophageal impairment  -MP       Clinical Swallow Evaluation Summary  --  Belching & cough following thin liquids. Multiple swallows w/ puree. Discussed options - pt wishing to pursue MBS + ltd upper GI @ this time to further assess/determine safest PO diet option.  -MP          Pharyngeal Phase Concerns    Pharyngeal Phase Concerns  --  cough;multiple swallows  -MP       Multiple Swallows  --  pudding  -MP        Cough  --  thin  -MP          Esophageal Phase Concerns    Esophageal Phase Concerns  --  belching  -MP       Belching  --  thin  -MP          MBS/VFSS    Utensils Used  spoon;cup;straw  -MP  --       Consistencies Trialed  thin liquids;pudding thick;regular textures  -MP  --          MBS/VFSS Interpretation    Oral Prep Phase  WFL  -MP  --       Oral Transit Phase  WFL  -MP  --       Oral Residue  WFL  -MP  --       VFSS Summary  Grossly functional oropharyngeal swallow. Adequate airway protection - no penetration/aspiration w/ any consistency tested. Mild pyriform residue w/ thin liquids that mostly cleared w/ spontaneous subsequent swallow. Per Radiology PA, no impairments noted on limited upper GI. Abnormal tissue noted in pharynx - discussed w/ MD & image provided in report. At this time, no further SLP intervention indicated. Will sign off.  -MP  --          Initiation of Pharyngeal Swallow    Initiation of Pharyngeal Swallow  bolus in valleculae  -MP  --       Pharyngeal Phase  functional pharyngeal phase of swallowing  -MP  --       Pharyngeal Residue  thin liquids;pyriform sinuses  -MP  --       Response to Residue  cleared residue with spontaneous subsequent swallow  -MP  --          Clinical Impression    SLP Swallowing Diagnosis  functional oral phase;functional pharyngeal phase;other (see comments) abnormal tissue noted in pharynx - MD aware, image provided  -MP  suspected pharyngeal dysphagia  -MP       Functional Impact  --  risk of aspiration/pneumonia  -MP       Rehab Potential/Prognosis, Swallowing  --  adequate, monitor progress closely  -       Swallow Criteria for Skilled Therapeutic Interventions Met  no problems identified which require skilled intervention  -MP  demonstrates skilled criteria  -          Recommendations    Therapy Frequency (Swallow)  evaluation only  -MP  --       Predicted Duration Therapy Intervention (Days)  --  until discharge  -       SLP Diet Recommendation   regular textures;thin liquids  -MP  other (see comments) continue MD-ordered diet until MBS  -MP       Recommended Diagnostics  --  VFSS (MBS);other (see comments) + ltd upper GI  -MP       Recommended Precautions and Strategies  upright posture during/after eating;small bites of food and sips of liquid;general aspiration precautions  -MP  upright posture during/after eating;small bites of food and sips of liquid;general aspiration precautions  -MP       Oral Care Recommendations  Oral Care BID/PRN  -MP  Oral Care BID/PRN  -MP       SLP Rec. for Method of Medication Administration  meds whole;with thin liquids;with pudding or applesauce;as tolerated crush PRN  -MP  meds whole;meds crushed;with thin liquids;with pudding or applesauce;as tolerated  -MP       Monitor for Signs of Aspiration  --  yes;notify SLP if any concerns  -MP       Anticipated Discharge Disposition (SLP)  unknown  -MP  unknown;anticipate therapy at next level of care  -MP         User Key  (r) = Recorded By, (t) = Taken By, (c) = Cosigned By    Initials Name Effective Dates    Braxton Carr MS CCC-SLP 06/16/21 -           EDUCATION  The patient has been educated in the following areas:   Dysphagia (Swallowing Impairment).              Time Calculation:   Time Calculation- SLP     Row Name 08/20/21 1159 08/20/21 1031          Time Calculation- SLP    SLP Start Time  1130  -MP  0930  -MP     SLP Received On  08/20/21  -MP  08/20/21  -MP        Untimed Charges    45735-DR Eval Oral Pharyng Swallow Minutes  --  40  -MP     75657-EQ Motion Fluoro Eval Swallow Minutes  60  -MP  --        Total Minutes    Untimed Charges Total Minutes  60  -MP  40  -MP      Total Minutes  60  -MP  40  -MP       User Key  (r) = Recorded By, (t) = Taken By, (c) = Cosigned By    Initials Name Provider Type    Braxton Carr MS CCC-SLP Speech and Language Pathologist          Therapy Charges for Today     Code Description Service Date Service Provider Modifiers  Qty    99308856343 HC ST EVAL ORAL PHARYNG SWALLOW 3 8/20/2021 Braxton [place] MS CCC-SLP GN 1    77748193300 HC ST MOTION FLUORO EVAL SWALLOW 4 8/20/2021 Braxton [place] MS CCC-SLP GN 1               MS AIME Dunn  8/20/2021

## 2021-08-20 NOTE — PROGRESS NOTES
Continued Stay Note  [facility]     Patient Name: Shannan Jha  MRN: [id number]  Today's Date: 8/20/2021    Admit Date: 8/19/2021    Discharge Plan     Row Name 08/20/21 1534       Plan    Plan  To be determined    Plan Comments  Hospice referral received, chart reviewed. Visit made to pt, pt stated is aware of the hospice referral. Pt stated  will not be at the hospital until this everning, though is open to hearing about hospice now and will share information with . Teaching done on hospice philosophy, services, and goals of care. Provided pt with hospice brochure. Plan made for the hospice nurse to make a visit tomorrow when  is present to answer questions and address concerns. Please call [phone] if can be of further assistance.        Discharge Codes    No documentation.       Expected Discharge Date and Time     Expected Discharge Date Expected Discharge Time    Aug 24, 2021             Lynsey Rodrigues RN

## 2021-08-20 NOTE — PAYOR COMM NOTE
"Sharda Quigley RN   Phone [phone]  Fax [phone]      Shannan Bruno (51 y.o. Female)     Date of Birth Social Security Number Address Home Phone MRN    1970  [address] [phone] [id number]    [employer] Marital Status          Hinduism        Admission Date Admission Type Admitting Provider Attending Provider Department, Room/Bed    21 Urgent Trang Holbrook MD Reddy, Mayuri V, MD [address], S576/1    Discharge Date Discharge Disposition Discharge Destination                       Attending Provider: Trang Holbrook MD    Allergies: Codeine    Isolation: None   Infection: None   Code Status: No CPR    Ht: 175.3 cm (69.02\")   Wt: 91.6 kg (202 lb)    Admission Cmt: None   Principal Problem: None                Active Insurance as of 2021     Primary Coverage     Payor Plan Insurance Group Employer/Plan Group    [employer]HEM BLUE CROSS [facility] [employer] BLUE SHIELD PPO [id number]     Payor Plan Address Payor Plan Phone Number Payor Plan Fax Number Effective Dates    [address] [phone]  2020 - None Entered    [facility] [address]       Subscriber Name Subscriber Birth Date Member ID       MAYA BRUNO [birth year]1965 [id number]                 Emergency Contacts      (Rel.) Home Phone Work Phone Mobile Phone    MAYA BRUNO (Spouse) [phone] -- [phone]               History & Physical      Jack Flores MD at 21 [address] [place] [address] Medicine Services  HISTORY AND PHYSICAL    Patient Name: Shannan Bruno  : 1970  MRN: [id number]  Primary Care Physician: Maricel Quinteros MD  Date of admission: 2021    Subjective     Chief Complaint: anemia, weakness    HPI:  Shannan Bruno is a 51 y.o. female with PMH significant for HTN, HLD, insulin-dependent DMII and hypothyroidism. On 21 a TVUS revealed 3.2cm endometrial stripe. Biopsy of a cervical " polyp was concerning for endometrial adenocarcinoma. She was referred to GYN/ONC and underwent robotic-assisted tumor debulking with total laparoscopic hysterectomy for uterus weighing > 250g, BSO, L ureterolysis, peritoneal stripping, lysis of adhesions, cyscoscopy with L temporary stent placement and biopsy of clitoral mass. Pathology returned with dedifferentiated endometrial carcinoma with involvement of the L fallopian tube and ovary and R periadnexal soft tissue. Clitoral munoz mass with dedifferentiated carcinoma myometrium. CT abdomen/pelvis showed diffuse metastatic disease with parenchymal liver lesions, suspicious for metastasis as well as supraclavicular, L rectopectoral, mediastinal, janice hepatis, retroperitoneal and pelvic adenopathy. Soft tissue mass at peritoneum measuring 4.5 x 4.1 cm.    She was placed on PACLitaxel/CARBOplatin Q21D for metastatic endometrial cancer.   She has since had issues with pain, hyponatremia and anemia.     She was directly admitted to [facility] 8/19/21 following an appointment with Dr. Jensen of GYN/ONC. Patient responded minimally and the majority of history was obtained from the patient's  and her chart. Ms. Carpenter has been getting increasingly weak over the past couple of weeks. Her oral intake has been poor. Her pain has been poorly-controlled with her current regimen. No fevers/chills, abdominal pain, nausea or vomiting. No chest pain or dyspnea.  denied confusion but he did note that her speech was difficult to understand when she first arrived to her hospital room.     CBC prior to admission revealed Hgb 6.4   CMP / Mag / Phos still pending.     Patient and her  tearful regarding goals of care discussions. They are open to palliative care consultation but seemed somewhat overwhelmed when we discussed power of , advanced directive and code status. I suggested that they take some time to think it over and we could revisit  this topic in the upcoming says. They were in agreement for FULL CODE at this time.     COVID Details:    Symptoms:    [x] NONE [] Fever []  Cough [] Shortness of breath [] Change in taste/smell      The patient has a COVID HM Topic on their chart, and they are fully vaccinated.    Review of Systems   Constitutional: Positive for activity change, appetite change and fatigue. Negative for chills and fever.   HENT: Negative.    Respiratory: Negative.    Cardiovascular: Positive for leg swelling.   Gastrointestinal: Negative for abdominal pain, constipation, diarrhea, nausea and vomiting.   Genitourinary: Negative for difficulty urinating.   Musculoskeletal: Positive for arthralgias, back pain and neck pain.   Skin: Negative.    Neurological: Positive for weakness.   Hematological: Negative.    Psychiatric/Behavioral: Negative.       All other systems reviewed and are negative.     Personal History     Past Medical History:   Diagnosis Date   • Adenocarcinoma (CMS/HCC)     endometrial   • Anxiety    • Depression    • Diabetes (CMS/HCC)    • GERD (gastroesophageal reflux disease)    • Heart murmur    • Hyperlipidemia    • Hypertension    • Thyroid disease    • Uterine cancer (CMS/HCC)      Past Surgical History:   Procedure Laterality Date   • ENDOSCOPY     • GALLBLADDER SURGERY     • TOTAL LAPAROSCOPIC HYSTERECTOMY SALPINGO OOPHORECTOMY N/A 6/18/2021    Procedure: TOTAL LAPAROSCOPIC RADICAL TUMOR DEBULKING WITH TOTAL HYSTERECTOMY (UTERUS > 250 g), BILATERAL SALPINGO-OOPHORECTOMY, LEFT URETROLYSIS, PERTIONEAL STRIPPING, LYSIS OF ADHESIONS (>90 MINUTES), CYSTOSCOPY WITH LEFT TEMPORARY STENT PLACEMENT, AND INJECTION FOR SENTINEL LYMPH NODE DISSECTION;  Surgeon: Jolie Jensen MD;  Location: [facility];  Service: Robotics - DaVinci;  Laterality: N/A   • WISDOM TOOTH EXTRACTION       Family History: family history includes Arthritis in her father; Cancer in her father; Diabetes in her brother and mother; Heart attack in  her mother; Heart disease in her mother; Stroke in her mother; Thyroid disease in her brother and father. Otherwise pertinent FHx was reviewed and unremarkable.     Social History:  reports that she has never smoked. She has never used smokeless tobacco. She reports that she does not drink alcohol and does not use drugs.  Social History     Social History Narrative   • Not on file     Medications:  Black Cohosh, Dapagliflozin Propanediol, Insulin Regular Human (Conc), Morphine, Omega-3 Fatty Acids, PARoxetine, Senna, acetaminophen, busPIRone, dexamethasone, docusate sodium, gabapentin, glimepiride, ibuprofen, levothyroxine, lisinopril, loratadine, megestrol, metFORMIN ER, mirtazapine, multivitamin, omeprazole, ondansetron, oxyCODONE, oxyCODONE-acetaminophen, polyethylene glycol, prochlorperazine, and rosuvastatin    Allergies   Allergen Reactions   • Codeine Nausea Only and Dizziness     Objective     Vital Signs:   Temp:  [95.7 °F (35.4 °C)-100.1 °F (37.8 °C)] 100.1 °F (37.8 °C)  Heart Rate:  [] 96  Resp:  [14-18] 16  BP: (112-123)/(59-70) 115/68    Physical Exam   Constitutional: Awake, alert and conversant. Sitting on side of bed   Eyes: PERRLA, sclerae anicteric, no conjunctival injection  HENT: NCAT, mucous membranes dry   Neck: Supple, no thyromegaly, no lymphadenopathy, trachea midline  Respiratory: Clear to auscultation bilaterally, nonlabored respirations on room air   Cardiovascular: Regular rhythm, tachycardic with rate 110's. No m/r/g.   Gastrointestinal: Positive bowel sounds, soft, nontender, mildly distended  Musculoskeletal: 2+ pitting bilateral ankle edema, no clubbing or cyanosis to extremities  Psychiatric: Flat affect, cooperative  Neurologic: Globally weak, speech soft but clear (1-2 word responses), no focal deficits     Results Reviewed:  I have personally reviewed most recent indicated data and agree with findings including:  [x]  Laboratory  []  Radiology  []  EKG/Telemetry  [x]   Pathology  []  Cardiac/Vascular Studies  [x]  Old records  []  Other:    LAB RESULTS:      Lab 08/19/21 1953 08/19/21  1749 08/19/21  1418   WBC  --  4.28 4.70   HEMOGLOBIN  --  6.4* 6.4*   HEMATOCRIT  --  18.9* 18.9*   PLATELETS  --  135* 177   NEUTROS ABS  --  3.03 3.80   IMMATURE GRANS (ABS)  --  0.43*  --    LYMPHS ABS  --  0.41* 0.60*   MONOS ABS  --  0.35 0.30   EOS ABS  --  0.05  --    MCV  --  78.1* 80.7   LACTATE 2.0  --   --          Lab 08/19/21 2202 08/19/21  1749   SODIUM 124* 125*   POTASSIUM 3.9 4.4   CHLORIDE 85* 84*   CO2 25.0 23.0   ANION GAP 14.0 18.0*   BUN 11 13   CREATININE 0.44* 0.49*   GLUCOSE 285* 362*   CALCIUM 8.7 9.4   IONIZED CALCIUM  --  1.22   MAGNESIUM 1.8 1.9   PHOSPHORUS  --  3.7   HEMOGLOBIN A1C  --  7.80*         Lab 08/19/21  1749   TOTAL PROTEIN 6.5   ALBUMIN 3.10*   GLOBULIN 3.4   ALT (SGPT) 22   AST (SGOT) 86*   BILIRUBIN 1.4*   ALK PHOS 330*                 Lab 08/19/21  1749   ABO TYPING O   RH TYPING Positive   ANTIBODY SCREEN Negative         Brief Urine Lab Results  (Last result in the past 365 days)      Color   Clarity   Blood   Leuk Est   Nitrite   Protein   CREAT   Urine HCG        08/19/21 2158 Yellow Clear Moderate (2+) Trace Positive 100 mg/dL (2+)             Microbiology Results (last 10 days)     Procedure Component Value - Date/Time    COVID PRE-OP / PRE-PROCEDURE SCREENING ORDER (NO ISOLATION) - Swab, Nasopharynx [[id number]]  (Normal) Collected: 08/19/21 2015    Lab Status: Final result Specimen: Swab from Nasopharynx Updated: 08/19/21 2034    Narrative:      The following orders were created for panel order COVID PRE-OP / PRE-PROCEDURE SCREENING ORDER (NO ISOLATION) - Swab, Nasopharynx.  Procedure                               Abnormality         Status                     ---------                               -----------         ------                     COVID-19, ABBOTT IN-HOUS...[[id number]]  Normal              Final result                 Please view  results for these tests on the individual orders.    COVID-19, ABBOTT IN-HOUSE,NASAL Swab (NO TRANSPORT MEDIA) 2 HR TAT - Swab, Nasopharynx [[id number]]  (Normal) Collected: 08/19/21 2015    Lab Status: Final result Specimen: Swab from Nasopharynx Updated: 08/19/21 2034     COVID19 Presumptive Negative    Narrative:      Fact sheet for providers: https://www.fda.gov/media/881443/download     Fact sheet for patients: https://www.fda.gov/media/398967/download    Test performed by PCR.  If inconsistent with clinical signs and symptoms patient should be tested with different authorized molecular test.        CT Head With & Without Contrast    Result Date: 8/19/2021  HISTORY: Mental status change, metastatic cancer TECHNIQUE: Axial head CT with and without IV contrast. Radiation dose reduction techniques included automated exposure control or exposure modulation based on body size. Count of known CT and cardiac nuc med studies performed in previous 12 months: 0. COMPARISON: None. FINDINGS: Prominently dural based lesion is seen abutting the left posterior cerebellar hemisphere measuring approximately 2 x 1.1 cm that demonstrates enhancement. There also appears to be some potential dural thickening at the posterior apex abutting the posterior left frontal lobe, although this is somewhat equivocal. There is no definite large intraparenchymal lesion although subtle smaller lesions cannot be entirely excluded. There is no definite osseous abnormality. The sphenoid sinuses are opacified.     Impression: Overall, the findings are concerning for potential dural based metastases. The largest abuts the posterior left cerebellum and there is an equivocal second lesion at the left posterior vertex. In addition, small intraparenchymal metastases are not excluded. Consider follow-up MRI with contrast. Signer Name: Martina Malin MD  Signed: 8/19/2021 9:21 PM  Workstation Name: [id number]  [facility]    CT Abdomen  Pelvis With Contrast    Result Date: 8/19/2021  CT Abdomen Pelvis W, CTA Chest INDICATION: Mental status change, endometrial carcinoma, assess for progression TECHNIQUE: CTA of the chest. CT of the abdomen and pelvis without IV contrast. Coronal and sagittal reconstructions were obtained.  Radiation dose reduction techniques included automated exposure control or exposure modulation based on body size. Count of known CT and cardiac nuc med studies performed in previous 12 months: 0. COMPARISON: CT of the abdomen from 7/2/21 FINDINGS: CTA chest: There is no definite evidence of pulmonary embolism although there is somewhat limitation of evaluation for small subsegmental embolus. The patient's left supraclavicular lymphadenopathy and left low cervical lymphadenopathy has significantly increased in size and there is shift of the trachea to the right. Multilevel mediastinal nodes have significantly increased in size and there is significant narrowing of the eleonora and mainstem bronchi bilaterally. The esophageal lumen is also compressed and may be significantly involved. There are new small bilateral pleural effusions, greater on the right as well as multiple new lung metastases noted bilaterally. Bone marrow attenuation is somewhat mottled and underlying metastatic disease cannot be entirely excluded although no definite focal lesion is identified. Abdomen: Multiple new metastatic lesions are noted throughout the liver. Lesions are noted within the bilateral adrenal glands, and the spleen is not well evaluated due to beam hardening artifact. However, there is suspicion for multiple splenic lesions as well. Multiple large jeana masses are seen scattered throughout the abdomen. There is also flattening of the inferior vena cava secondary to jeana mass. Pelvis: Malignant lesion abuts the wall of the rectum anteriorly. There is a small amount of fluid in the pelvis. Multiple abnormal aortocaval and periaortic nodes are  noted. No acute bowel abnormality is appreciated. No acute osseous abnormality.     Impression: 1. There has been gross interval progression of metastatic disease as described above throughout the chest, abdomen and pelvis. 2. There is no definite evidence of pulmonary embolism although there is somewhat limitation of evaluation for small subsegmental embolus. Signer Name: Martina Malin MD  Signed: 8/19/2021 9:31 PM  Workstation Name: [id number]  [facility]    CT Angiogram Chest    Result Date: 8/19/2021  CT Abdomen Pelvis W, CTA Chest INDICATION: Mental status change, endometrial carcinoma, assess for progression TECHNIQUE: CTA of the chest. CT of the abdomen and pelvis without IV contrast. Coronal and sagittal reconstructions were obtained.  Radiation dose reduction techniques included automated exposure control or exposure modulation based on body size. Count of known CT and cardiac nuc med studies performed in previous 12 months: 0. COMPARISON: CT of the abdomen from 7/2/21 FINDINGS: CTA chest: There is no definite evidence of pulmonary embolism although there is somewhat limitation of evaluation for small subsegmental embolus. The patient's left supraclavicular lymphadenopathy and left low cervical lymphadenopathy has significantly increased in size and there is shift of the trachea to the right. Multilevel mediastinal nodes have significantly increased in size and there is significant narrowing of the eleonora and mainstem bronchi bilaterally. The esophageal lumen is also compressed and may be significantly involved. There are new small bilateral pleural effusions, greater on the right as well as multiple new lung metastases noted bilaterally. Bone marrow attenuation is somewhat mottled and underlying metastatic disease cannot be entirely excluded although no definite focal lesion is identified. Abdomen: Multiple new metastatic lesions are noted throughout the liver. Lesions are noted  within the bilateral adrenal glands, and the spleen is not well evaluated due to beam hardening artifact. However, there is suspicion for multiple splenic lesions as well. Multiple large jeana masses are seen scattered throughout the abdomen. There is also flattening of the inferior vena cava secondary to jeana mass. Pelvis: Malignant lesion abuts the wall of the rectum anteriorly. There is a small amount of fluid in the pelvis. Multiple abnormal aortocaval and periaortic nodes are noted. No acute bowel abnormality is appreciated. No acute osseous abnormality.     Impression: 1. There has been gross interval progression of metastatic disease as described above throughout the chest, abdomen and pelvis. 2. There is no definite evidence of pulmonary embolism although there is somewhat limitation of evaluation for small subsegmental embolus. Signer Name: Martina Malin MD  Signed: 8/19/2021 9:31 PM  Workstation Name: [id number]  [facility]      Assessment & Plan       Endometrial cancer (CMS/HCC)    Type 2 diabetes mellitus (CMS/HCC)    Dehydration    Drug induced constipation    Cancer related pain    Acute anemia    Generalized weakness    Endometrial cancer, FIGO stage IVB (CMS/HCC)       Shannan Jha is a 51 y.o. female w/ hx insulin dep DM2, hypothyroidism who recently was diagnosed w/ endometrial cancer 5/20/21 w/ biopsy of cervical polyp (after ultrasound revealed thickened endometrial stripe). On 6/18/21 patient nderwent robitic assisted laparoscopic SUSHILA, BSO, left ureterolysis, peritoneal stripping, sussy, cystoscopy w/ temporary stent placement biopsy of clitoral mass. Pathology revealed endometrial carcinoma w/ subsequent ct scan 7/2/21 revealing diffuse metastatic disease involving liver, diffuse adenopathy (left supraclavicular, left retropectoral, mediastinal, janice hepatis, retroperitoneum, and pelvis). Patient underwent chemotherapy on 7/15/21 (paclitaxel and carboplatin). Patient  has developed hyponatremia and anemia. Labs on 8/12/21 revealed sodium 124 (w/ glucose 331) and hgb 7.3. patient.        Presented back to Dr. Jensen's office 8/19/21 w/ worsening fatigue, confusion, ataxia and worsening right neck pain radiating to her head and back, having not eaten much in the two days prior related to difficulty swallowing and choking when she eats or drinks, along with some dyspnea. hgb was noted to be 6.4 and arrangements were made for direct admission to hospitalist service. Subsequent labwork revealed sodium 125, glucose 362, serum bicarb 23 w/ anion gap 18. Normal lactate. a1c 7.8. mildly elevated bilirubin 1.4. patient was ordered iv fluids, 2 units or blood, and frequent glucose monitoring & correction insulin. Patient developed spells of somnolence, during which patient would close her eyes but was difficult to arouse, brief in duratoin. No tonic/clonic activity, no rigidity, no starting spells, no focal weakness. Patient had another spell lasting 5-10 minutes and was given keppra 1000mg. Ct head/chest/abd/pelvis w/ contrast was obtained.   Ct head revealed probable dural based metastatic disease, largest 2 x 1.1 cm dural based lesion abutting left posterior cerebellar hemisphere w/ enhancement, and some potential dural thickening posterior apex abutting the posterior left frontal lobe. Ct c/a/p revealed no pulmonary embolism, but showed significant diffuse interval progression of metastatic disease including progression of supraclavicular and cervical lymphadenopathy w/ shift of trachea to right, mediastinal adenopathy w/ narrowing of eleonora and bilateral mainstem bronchi, esophageal lumen compression; new small bilateral effusions w/ new multiple lung mets, worsening dz throughout the liver, splenic lesions, and large jeana masses seen throughout the abdomen and flattening of the inferior vena cava secondary to jeana mass; malignant lesion also abuts the wall of rectum anterioly. Was  initiated on decadron 4mg iv q6h and mri brain w & w/o ordered. I had extensive discussion w/ patient and  at bedside and discussed results of the scans.   Due to rapid and diffuse progression of metastatic disease (including brain) we discussed the poor prognosis, and  (patient confused) stated that in event of cardiopulmonary arrest would not wish to pursue mechanical ventilation or cpr, as such patient DNR/DNI, however supporting otherwise at this point. Subsequent urinalysis revealed uti and rocephin was initiated. Palliative consulted for morning and Dr. Jensen consulted as well        Rapidly Progressive Metastatic Endometrial Cancer  Encephalopathy/confusion (due to brain mets, +/- subclinical seizure, + uti, + hyponatremia)  Malignancy related pain  -CT scans suggesting new brain mets, new lung mets, adenopathy (including mediastinal) w/ mainstem bronchi & tracheal displacement, diffuse liver disease, suggestion of splenic dz, and adnopathy displacing the inferior vena cava  -mri brain ordered  -keppra empiric rx  -decadron 4mg iv q6h  -Dr. Jensen consulted for morning  -Palliative consulted (goals/limits, symptoms)  Anemia, likely related to chemotherapy  -hgb 6.4  -transufins 2 unit prbc  Hyponatremia  -q4h bmp  -ns 75cc/hr, monitor  Uncontrolled hyperglycemia/DM2  -hasn't been taking her insulin; currently worsened by steroids  -q3h fsbs w/ medium correction humalog  UTI, poa  -rocephin day #1, follow culture  Dysphagia  -SLP  -likely due to thrush and esophageal displacement due to cancer/adenopathy  Thrush  -single dose micafungin (couldn't give diflucan due to other home meds)  -nystatin s&s      DVT prophylaxis: mechanical    *total of 70 minutes care provided, >50% spent at bedside counseling patient &     CODE STATUS:    Code Status and Medical Interventions:   Ordered at: 08/19/21 3340     Limited Support to NOT Include:    Intubation     Code Status:    No CPR     Medical  Interventions (Level of Support Prior to Arrest):    Limited     This note has been completed as part of a split-shared workflow.     Electronically signed by Perla Ambrose PA-C, 08/19/21, 5:30 PM EDT.          Attending   Admission Attestation       I have seen and examined the patient, performing an independent face-to-face diagnostic evaluation with plan of care reviewed and developed with the advanced practice clinician (APC).      Brief Summary Statement:   Shannan Jha is a 51 y.o. female w/ hx insulin dep DM2, hypothyroidism who recently was diagnosed w/ endometrial cancer 5/20/21 w/ biopsy of cervical polyp (after ultrasound revealed thickened endometrial stripe). On 6/18/21 patient nderwent robitic assisted laparoscopic SUSHILA, BSO, left ureterolysis, peritoneal stripping, sussy, cystoscopy w/ temporary stent placement biopsy of clitoral mass. Pathology revealed endometrial carcinoma w/ subsequent ct scan 7/2/21 revealing diffuse metastatic disease involving liver, diffuse adenopathy (left supraclavicular, left retropectoral, mediastinal, janice hepatis, retroperitoneum, and pelvis). Patient underwent chemotherapy on 7/15/21 (paclitaxel and carboplatin). Patient has developed hyponatremia and anemia. Labs on 8/12/21 revealed sodium 124 (w/ glucose 331) and hgb 7.3. patient.        Presented back to Dr. Jensen's office 8/19/21 w/ worsening fatigue, confusion, ataxia and worsening right neck pain radiating to her head and back, having not eaten much in the two days prior related to difficulty swallowing and choking when she eats or drinks, along with some dyspnea. hgb was noted to be 6.4 and arrangements were made for direct admission to hospitalist service. Subsequent labwork revealed sodium 125, glucose 362, serum bicarb 23 w/ anion gap 18. Normal lactate. a1c 7.8. mildly elevated bilirubin 1.4. patient was ordered iv fluids, 2 units or blood, and frequent glucose monitoring & correction insulin.  Patient developed spells of somnolence, during which patient would close her eyes but was difficult to arouse, brief in duratoin. No tonic/clonic activity, no rigidity, no starting spells, no focal weakness. Patient had another spell lasting 5-10 minutes and was given keppra 1000mg. Ct head/chest/abd/pelvis w/ contrast was obtained.   Ct head revealed probable dural based metastatic disease, largest 2 x 1.1 cm dural based lesion abutting left posterior cerebellar hemisphere w/ enhancement, and some potential dural thickening posterior apex abutting the posterior left frontal lobe. Ct c/a/p revealed no pulmonary embolism, but showed significant diffuse interval progression of metastatic disease including progression of supraclavicular and cervical lymphadenopathy w/ shift of trachea to right, mediastinal adenopathy w/ narrowing of eleonora and bilateral mainstem bronchi, esophageal lumen compression; new small bilateral effusions w/ new multiple lung mets, worsening dz throughout the liver, splenic lesions, and large jeana masses seen throughout the abdomen and flattening of the inferior vena cava secondary to jeana mass; malignant lesion also abuts the wall of rectum anterioly. Was initiated on decadron 4mg iv q6h and mri brain w & w/o ordered. I had extensive discussion w/ patient and  at bedside and discussed results of the scans.   Due to rapid and diffuse progression of metastatic disease (including brain) we discussed the poor prognosis, and  (patient confused) stated that in event of cardiopulmonary arrest would not wish to pursue mechanical ventilation or cpr, as such patient DNR/DNI, however supporting otherwise at this point. Subsequent urinalysis revealed uti and rocephin was initiated. Palliative consulted for morning and Dr. Jensen consulted as well          Remainder of detailed HPI is as noted by APC and has been reviewed and/or edited by me for completeness.    Attending Physical  Exam:  Constitutional: alert, confused, somewhat tangential speech, no distress  Psych:somewhat flat  HEENT:Ncat, oroph clear  Neck: neck supple, full range of motion  Neuro: face symmetric, speech clear (content tangential), oriented to person, place; right pupil slightly larger than left w/ both pupils reactive, eomi; moves all extremities, neck supple  Cardiac: Rrr; No pretibial pitting edema  Resp: Ctab, normal effort  GI: abd soft, nontender, obese  Skin: No extremity rash  Musculoskeletal/extremities: no cyanosis extremities; no significant ankle edema        Brief Assessment/Plan :  See detailed assessment and plan developed with APC which I have reviewed and/or edited for completeness.        Admission Status: I believe that this patient meets inpatient status      Jack Flores MD  08/20/21                      Electronically signed by Jack Flores MD at 08/20/21 0225       Emergency Department Notes    No notes of this type exist for this encounter.         Vital Signs (last day)     Date/Time   Temp   Temp src   Pulse   Resp   BP   Patient Position   SpO2    08/20/21 0839   98.5 (36.9)   Axillary   90   16   117/67   Lying   91    08/20/21 0412   98.5 (36.9)   Axillary   91   18   102/58   --   92    08/20/21 0129   100.1 (37.8)   Axillary   96   16   115/68   --   90    08/19/21 2309   98 (36.7)   Axillary   96   16   118/66   --   92    08/19/21 2240   98.3 (36.8)   Axillary   94   16   117/66   --   93    08/19/21 2117   --   --   100   18   119/70   Lying   92    08/19/21 2020   97 (36.1)   Axillary   102   18   116/68   --   93    08/19/21 2011   --   --   100   18   112/69   --   92    08/19/21 1945   97 (36.1)   Axillary   101   18   118/69   --   93    08/19/21 1732   --   --   98   16   --   --   94    08/19/21 1553   98.5 (36.9)   Axillary   103   16   120/59   Lying   91                Facility-Administered Medications as of 8/20/2021   Medication Dose Route Frequency Provider  Last Rate Last Admin   • busPIRone (BUSPAR) tablet 5 mg  5 mg Oral TID Jack Flores MD       • castor oil-balsam peru (VENELEX) ointment   Topical Q12H Trang Holbrook MD       • cefTRIAXone (ROCEPHIN) 1 g/100 mL 0.9% NS (MBP)  1 g Intravenous Q24H DevonaniVarsha, APRN   1 g at 08/19/21 2342   • dexamethasone (DECADRON) injection 4 mg  4 mg Intravenous Q6H Jack Flores MD   4 mg at 08/20/21 0538   • dextrose (D50W) 25 g/ 50mL Intravenous Solution 25 g  25 g Intravenous Q15 Min PRN Jack Flores MD       • dextrose (GLUTOSE) oral gel 15 g  15 g Oral Q15 Min PRN Jack Flores MD       • docusate sodium (COLACE) capsule 200 mg  200 mg Oral Daily Perla Ambrose PA-C   200 mg at 08/19/21 1755   • gabapentin (NEURONTIN) capsule 300 mg  300 mg Oral TID Jack Flores MD       • glucagon (human recombinant) (GLUCAGEN DIAGNOSTIC) injection 1 mg  1 mg Subcutaneous Q15 Min PRN Jack Flores MD       • insulin lispro (humaLOG) injection 0-14 Units  0-14 Units Subcutaneous Q3H Jack Flores MD   5 Units at 08/20/21 0912   • [COMPLETED] iopamidol (ISOVUE-370) 76 % injection 100 mL  100 mL Intravenous Once in imaging Jack Flores MD   100 mL at 08/19/21 2108   • levETIRAcetam in NaCl 0.75% (KEPPRA) IVPB 1,000 mg  1,000 mg Intravenous Q12H Jack Flores  mL/hr at 08/20/21 0538 1,000 mg at 08/20/21 0538   • levothyroxine (SYNTHROID, LEVOTHROID) tablet 250 mcg  250 mcg Oral Once per day on Mon Tue Wed Thu Fri Sat Perla Ambrose PA-C       • magnesium sulfate 4 gram infusion - Mg less than or equal to 1mg/dL  4 g Intravenous PRN Jack Flores MD        Or   • magnesium sulfate 3 gram infusion (1gm x 3) - Mg 1.1 - 1.5 mg/dL  1 g Intravenous PRN Jack Flores MD        Or   • Magnesium Sulfate 2 gram infusion- Mg 1.6 - 1.9 mg/dL  2 g Intravenous PRN Jack Flores MD       • megestrol (MEGACE) tablet 80 mg  80 mg Oral BID Marchik  Perla VELASCO PA-C       • [COMPLETED] micafungin 100 mg/100 mL 0.9% NS IVPB (mbp)  100 mg Intravenous Once Jack Flores MD   100 mg at 08/19/21 1942   • Morphine (MS CONTIN) 12 hr tablet 30 mg  30 mg Oral BID Perla Ambrose PA-C       • nystatin (MYCOSTATIN) 663536 UNIT/ML suspension 500,000 Units  5 mL Swish & Swallow 4x Daily Perla Ambrose PA-C   500,000 Units at 08/20/21 0922   • ondansetron (ZOFRAN) tablet 4 mg  4 mg Oral Q6H PRN Perla Ambrose PA-C        Or   • ondansetron (ZOFRAN) injection 4 mg  4 mg Intravenous Q6H PRN Perla Ambrose PA-C   4 mg at 08/20/21 0608   • oxyCODONE (ROXICODONE) immediate release tablet 10 mg  10 mg Oral Q6H PRN Perla Ambrose PA-C   10 mg at 08/19/21 1755   • pantoprazole (PROTONIX) EC tablet 40 mg  40 mg Oral BID AC Perla Ambrose PA-C   40 mg at 08/19/21 1755   • PARoxetine (PAXIL) tablet 20 mg  20 mg Oral Daily Jack Flores MD       • polyethylene glycol (MIRALAX) packet 17 g  17 g Oral Daily Perla Ambrose PA-C   17 g at 08/19/21 1756   • senna (SENOKOT) tablet 2 tablet  2 tablet Oral Nightly Perla Ambrose PA-C       • sodium chloride 0.9 % flush 10 mL  10 mL Intravenous Q12H Perla Ambrose PA-C   10 mL at 08/20/21 0923   • sodium chloride 0.9 % flush 10 mL  10 mL Intravenous PRN Perla Ambrose PA-C       • sodium chloride 0.9 % infusion  75 mL/hr Intravenous Continuous Jack Flores MD 75 mL/hr at 08/19/21 1941 75 mL/hr at 08/19/21 1941       Lab Results (last 24 hours)     Procedure Component Value Units Date/Time    Basic Metabolic Panel [[id number]]  (Abnormal) Collected: 08/20/21 0824    Specimen: Blood Updated: 08/20/21 0925     Glucose 223 mg/dL      BUN 9 mg/dL      Creatinine 0.36 mg/dL      Sodium 125 mmol/L      Potassium 4.0 mmol/L      Chloride 86 mmol/L      CO2 26.0 mmol/L      Calcium 8.6 mg/dL      eGFR Non African Amer >150 mL/min/1.73      BUN/Creatinine  Ratio 25.0     Anion Gap 13.0 mmol/L     Narrative:      GFR Normal >60  Chronic Kidney Disease <60  Kidney Failure <15      POC Glucose Once [[id number]]  (Abnormal) Collected: 08/20/21 0857    Specimen: Blood Updated: 08/20/21 0858     Glucose 235 mg/dL      Comment: Meter: [id number] : [id number] Zack Back       Sodium, Urine, Random - Urine, Clean Catch [[id number]] Collected: 08/20/21 0622    Specimen: Urine, Clean Catch Updated: 08/20/21 0802     Sodium, Urine 34 mmol/L     Narrative:      Reference intervals for random urine have not been established.  Clinical usage is dependent upon physician's interpretation in combination with other laboratory tests.       Osmolality, Urine - Urine, Clean Catch [[id number]]  (Normal) Collected: 08/20/21 0622    Specimen: Urine, Clean Catch Updated: 08/20/21 0754     Osmolality, Urine 715 mOsm/kg     Basic Metabolic Panel [[id number]]  (Abnormal) Collected: 08/20/21 0439    Specimen: Blood Updated: 08/20/21 0722     Glucose 273 mg/dL      BUN 10 mg/dL      Creatinine 0.35 mg/dL      Sodium 125 mmol/L      Potassium 3.9 mmol/L      Comment: Slight hemolysis detected by analyzer. Results may be affected.        Chloride 86 mmol/L      CO2 20.0 mmol/L      Calcium 8.7 mg/dL      eGFR Non African Amer >150 mL/min/1.73      BUN/Creatinine Ratio 28.6     Anion Gap 19.0 mmol/L     Narrative:      GFR Normal >60  Chronic Kidney Disease <60  Kidney Failure <15      TSH [[id number]]  (Abnormal) Collected: 08/20/21 0439    Specimen: Blood Updated: 08/20/21 0702     TSH 18.690 uIU/mL     Narrative:      Due to abnormal TSH results, suggest ordering Free T4.    Urinalysis, Microscopic Only - Urine, Clean Catch [[id number]]  (Abnormal) Collected: 08/20/21 0622    Specimen: Urine, Clean Catch Updated: 08/20/21 0643     RBC, UA 3-6 /HPF      WBC, UA 6-12 /HPF      Bacteria, UA None Seen /HPF      Squamous Epithelial Cells, UA 0-2 /HPF      Hyaline Casts, UA 0-6 /LPF      Methodology  Automated Microscopy    Urinalysis With Microscopic If Indicated (No Culture) - Urine, Clean Catch [[id number]]  (Abnormal) Collected: 08/20/21 0622    Specimen: Urine, Clean Catch Updated: 08/20/21 0643     Color, UA Yellow     Appearance, UA Clear     pH, UA 6.5     Specific Gravity, UA 1.055     Glucose,  mg/dL (Trace)     Ketones, UA 80 mg/dL (3+)     Bilirubin, UA Negative     Blood, UA Negative     Protein, UA 30 mg/dL (1+)     Leuk Esterase, UA Trace     Nitrite, UA Negative     Urobilinogen, UA 1.0 E.U./dL    CBC (No Diff) [[id number]]  (Abnormal) Collected: 08/20/21 0439    Specimen: Blood Updated: 08/20/21 0546     WBC 3.72 10*3/mm3      RBC 2.62 10*6/mm3      Hemoglobin 7.3 g/dL      Hematocrit 22.2 %      MCV 84.7 fL      MCH 27.9 pg      MCHC 32.9 g/dL      RDW 17.0 %      RDW-SD 53.0 fl      MPV 9.6 fL      Platelets 100 10*3/mm3     POC Glucose Once [[id number]]  (Abnormal) Collected: 08/20/21 0532    Specimen: Blood Updated: 08/20/21 0533     Glucose 297 mg/dL      Comment: Meter: [id number] : [id number]       POC Glucose Once [[id number]]  (Abnormal) Collected: 08/20/21 0324    Specimen: Blood Updated: 08/20/21 0325     Glucose 313 mg/dL      Comment: Meter: [id number] : [id number]       Basic Metabolic Panel [[id number]]  (Abnormal) Collected: 08/19/21 2202    Specimen: Blood Updated: 08/19/21 2242     Glucose 285 mg/dL      BUN 11 mg/dL      Creatinine 0.44 mg/dL      Sodium 124 mmol/L      Potassium 3.9 mmol/L      Chloride 85 mmol/L      CO2 25.0 mmol/L      Calcium 8.7 mg/dL      eGFR Non African Amer >150 mL/min/1.73      BUN/Creatinine Ratio 25.0     Anion Gap 14.0 mmol/L     Narrative:      GFR Normal >60  Chronic Kidney Disease <60  Kidney Failure <15      Magnesium [[id number]]  (Normal) Collected: 08/19/21 2202    Specimen: Blood Updated: 08/19/21 2242     Magnesium 1.8 mg/dL     Urinalysis, Microscopic Only - Urine, Catheter In/Out [[id number]]   (Abnormal) Collected: 08/19/21 2158    Specimen: Urine, Catheter In/Out Updated: 08/19/21 2209     RBC, UA 31-50 /HPF      WBC, UA 21-30 /HPF      Bacteria, UA 1+ /HPF      Squamous Epithelial Cells, UA 0-2 /HPF      Hyaline Casts, UA 13-20 /LPF      Methodology Automated Microscopy    Urinalysis With Culture If Indicated - Urine, Catheter In/Out [[id number]]  (Abnormal) Collected: 08/19/21 2158    Specimen: Urine, Catheter In/Out Updated: 08/19/21 2209     Color, UA Yellow     Appearance, UA Clear     pH, UA 6.5     Specific Gravity, UA >=1.030     Glucose,  mg/dL (2+)     Ketones, UA >=160 mg/dL (4+)     Bilirubin, UA Negative     Blood, UA Moderate (2+)     Protein,  mg/dL (2+)     Leuk Esterase, UA Trace     Nitrite, UA Positive     Urobilinogen, UA 1.0 E.U./dL    Urine Culture - Urine, Urine, Catheter In/Out [[id number]] Collected: 08/19/21 2158    Specimen: Urine, Catheter In/Out Updated: 08/19/21 2209    POC Glucose Once [[id number]]  (Abnormal) Collected: 08/19/21 2153    Specimen: Blood Updated: 08/19/21 2154     Glucose 293 mg/dL      Comment: Meter: [id number] : [id number] [facility]       Lactic Acid, Plasma [[id number]]  (Normal) Collected: 08/19/21 1953    Specimen: Blood Updated: 08/19/21 2104     Lactate 2.0 mmol/L      Comment: Falsely depressed results may occur on samples drawn from patients receiving N-Acetylcysteine (NAC) or Metamizole.       COVID PRE-OP / PRE-PROCEDURE SCREENING ORDER (NO ISOLATION) - Swab, Nasopharynx [[id number]]  (Normal) Collected: 08/19/21 2015    Specimen: Swab from Nasopharynx Updated: 08/19/21 2034    Narrative:      The following orders were created for panel order COVID PRE-OP / PRE-PROCEDURE SCREENING ORDER (NO ISOLATION) - Swab, Nasopharynx.  Procedure                               Abnormality         Status                     ---------                               -----------         ------                     COVID-19, ABBOTT  IN-HOUS...[[id number]]  Normal              Final result                 Please view results for these tests on the individual orders.    COVID-19, ABBOTT IN-HOUSE,NASAL Swab (NO TRANSPORT MEDIA) 2 HR TAT - Swab, Nasopharynx [[id number]]  (Normal) Collected: 08/19/21 2015    Specimen: Swab from Nasopharynx Updated: 08/19/21 2034     COVID19 Presumptive Negative    Narrative:      Fact sheet for providers: https://www.fda.gov/media/628479/download     Fact sheet for patients: https://www.fda.gov/media/861273/download    Test performed by PCR.  If inconsistent with clinical signs and symptoms patient should be tested with different authorized molecular test.    Osmolality, Serum [[id number]]  (Abnormal) Collected: 08/19/21 1749    Specimen: Blood Updated: 08/19/21 2015     Osmolality 272 mOsm/kg     CBC & Differential [[id number]]  (Abnormal) Collected: 08/19/21 1749    Specimen: Blood Updated: 08/19/21 1908    Narrative:      The following orders were created for panel order CBC & Differential.  Procedure                               Abnormality         Status                     ---------                               -----------         ------                     CBC Auto Differential[[id number]]        Abnormal            Final result               Scan Slide[[id number]]                   Normal              Final result                 Please view results for these tests on the individual orders.    CBC Auto Differential [[id number]]  (Abnormal) Collected: 08/19/21 1749    Specimen: Blood Updated: 08/19/21 1908     WBC 4.28 10*3/mm3      RBC 2.42 10*6/mm3      Hemoglobin 6.4 g/dL      Hematocrit 18.9 %      MCV 78.1 fL      MCH 26.4 pg      MCHC 33.9 g/dL      RDW 17.6 %      RDW-SD 50.5 fl      MPV 9.7 fL      Platelets 135 10*3/mm3      Neutrophil % 70.8 %      Lymphocyte % 9.6 %      Monocyte % 8.2 %      Eosinophil % 1.2 %      Basophil % 0.2 %      Immature Grans % 10.0 %      Neutrophils, Absolute 3.03 10*3/mm3       Lymphocytes, Absolute 0.41 10*3/mm3      Monocytes, Absolute 0.35 10*3/mm3      Eosinophils, Absolute 0.05 10*3/mm3      Basophils, Absolute 0.01 10*3/mm3      Immature Grans, Absolute 0.43 10*3/mm3      nRBC 1.2 /100 WBC     Narrative:      Appended report. These results have been appended to a previously verified report.    Scan Slide [[id number]]  (Normal) Collected: 08/19/21 1749    Specimen: Blood Updated: 08/19/21 1908     RBC Morphology Normal     WBC Morphology Normal     Platelet Morphology Normal    Hemoglobin A1c [[id number]]  (Abnormal) Collected: 08/19/21 1749    Specimen: Blood Updated: 08/19/21 1859     Hemoglobin A1C 7.80 %     Narrative:      Hemoglobin A1C Ranges:    Increased Risk for Diabetes  5.7% to 6.4%  Diabetes                     >= 6.5%  Diabetic Goal                < 7.0%    Calcium, Ionized [[id number]]  (Normal) Collected: 08/19/21 1749    Specimen: Blood Updated: 08/19/21 1857     Ionized Calcium 1.22 mmol/L     Comprehensive Metabolic Panel [[id number]]  (Abnormal) Collected: 08/19/21 1749    Specimen: Blood Updated: 08/19/21 1851     Glucose 362 mg/dL      BUN 13 mg/dL      Creatinine 0.49 mg/dL      Sodium 125 mmol/L      Potassium 4.4 mmol/L      Chloride 84 mmol/L      CO2 23.0 mmol/L      Calcium 9.4 mg/dL      Total Protein 6.5 g/dL      Albumin 3.10 g/dL      ALT (SGPT) 22 U/L      AST (SGOT) 86 U/L      Alkaline Phosphatase 330 U/L      Total Bilirubin 1.4 mg/dL      eGFR Non African Amer 133 mL/min/1.73      Globulin 3.4 gm/dL      A/G Ratio 0.9 g/dL      BUN/Creatinine Ratio 26.5     Anion Gap 18.0 mmol/L     Narrative:      GFR Normal >60  Chronic Kidney Disease <60  Kidney Failure <15      Phosphorus [[id number]]  (Normal) Collected: 08/19/21 1749    Specimen: Blood Updated: 08/19/21 1851     Phosphorus 3.7 mg/dL     Magnesium [[id number]]  (Normal) Collected: 08/19/21 1749    Specimen: Blood Updated: 08/19/21 1851     Magnesium 1.9 mg/dL     POC Glucose Once [[id number]]   (Abnormal) Collected: 08/19/21 1757    Specimen: Blood Updated: 08/19/21 1758     Glucose 359 mg/dL      Comment: Meter: [id number] : [id number] Hussein Miltonn           Imaging Results (Last 24 Hours)     Procedure Component Value Units Date/Time    CT Angiogram Chest [[id number]] Collected: 08/19/21 2131     Updated: 08/19/21 2133    Narrative:      CT Abdomen Pelvis W, CTA Chest    INDICATION:   Mental status change, endometrial carcinoma, assess for progression    TECHNIQUE:   CTA of the chest. CT of the abdomen and pelvis without IV contrast. Coronal and sagittal reconstructions were obtained.  Radiation dose reduction techniques included automated exposure control or exposure modulation based on body size. Count of known CT  and cardiac nuc med studies performed in previous 12 months: 0.     COMPARISON:   CT of the abdomen from 7/2/21    FINDINGS:  CTA chest: There is no definite evidence of pulmonary embolism although there is somewhat limitation of evaluation for small subsegmental embolus. The patient's left supraclavicular lymphadenopathy and left low cervical lymphadenopathy has significantly  increased in size and there is shift of the trachea to the right. Multilevel mediastinal nodes have significantly increased in size and there is significant narrowing of the eleonora and mainstem bronchi bilaterally. The esophageal lumen is also compressed  and may be significantly involved. There are new small bilateral pleural effusions, greater on the right as well as multiple new lung metastases noted bilaterally. Bone marrow attenuation is somewhat mottled and underlying metastatic disease cannot be  entirely excluded although no definite focal lesion is identified.    Abdomen: Multiple new metastatic lesions are noted throughout the liver. Lesions are noted within the bilateral adrenal glands, and the spleen is not well evaluated due to beam hardening artifact. However, there is suspicion for multiple splenic  lesions  as well. Multiple large jeana masses are seen scattered throughout the abdomen. There is also flattening of the inferior vena cava secondary to jeana mass.    Pelvis: Malignant lesion abuts the wall of the rectum anteriorly. There is a small amount of fluid in the pelvis. Multiple abnormal aortocaval and periaortic nodes are noted. No acute bowel abnormality is appreciated. No acute osseous abnormality.      Impression:      1. There has been gross interval progression of metastatic disease as described above throughout the chest, abdomen and pelvis.  2. There is no definite evidence of pulmonary embolism although there is somewhat limitation of evaluation for small subsegmental embolus.          Signer Name: Martina Malin MD   Signed: 8/19/2021 9:31 PM   Workstation Name: [id number]    [facility]    CT Abdomen Pelvis With Contrast [[id number]] Collected: 08/19/21 2131     Updated: 08/19/21 2133    Narrative:      CT Abdomen Pelvis W, CTA Chest    INDICATION:   Mental status change, endometrial carcinoma, assess for progression    TECHNIQUE:   CTA of the chest. CT of the abdomen and pelvis without IV contrast. Coronal and sagittal reconstructions were obtained.  Radiation dose reduction techniques included automated exposure control or exposure modulation based on body size. Count of known CT  and cardiac nuc med studies performed in previous 12 months: 0.     COMPARISON:   CT of the abdomen from 7/2/21    FINDINGS:  CTA chest: There is no definite evidence of pulmonary embolism although there is somewhat limitation of evaluation for small subsegmental embolus. The patient's left supraclavicular lymphadenopathy and left low cervical lymphadenopathy has significantly  increased in size and there is shift of the trachea to the right. Multilevel mediastinal nodes have significantly increased in size and there is significant narrowing of the eleonora and mainstem bronchi bilaterally. The  esophageal lumen is also compressed  and may be significantly involved. There are new small bilateral pleural effusions, greater on the right as well as multiple new lung metastases noted bilaterally. Bone marrow attenuation is somewhat mottled and underlying metastatic disease cannot be  entirely excluded although no definite focal lesion is identified.    Abdomen: Multiple new metastatic lesions are noted throughout the liver. Lesions are noted within the bilateral adrenal glands, and the spleen is not well evaluated due to beam hardening artifact. However, there is suspicion for multiple splenic lesions  as well. Multiple large jeana masses are seen scattered throughout the abdomen. There is also flattening of the inferior vena cava secondary to jeana mass.    Pelvis: Malignant lesion abuts the wall of the rectum anteriorly. There is a small amount of fluid in the pelvis. Multiple abnormal aortocaval and periaortic nodes are noted. No acute bowel abnormality is appreciated. No acute osseous abnormality.      Impression:      1. There has been gross interval progression of metastatic disease as described above throughout the chest, abdomen and pelvis.  2. There is no definite evidence of pulmonary embolism although there is somewhat limitation of evaluation for small subsegmental embolus.          Signer Name: Martina Malin MD   Signed: 8/19/2021 9:31 PM   Workstation Name: [id number]    [facility]    CT Head With & Without Contrast [[id number]] Collected: 08/19/21 2121     Updated: 08/19/21 2123    Narrative:      HISTORY:   Mental status change, metastatic cancer    TECHNIQUE:   Axial head CT with and without IV contrast. Radiation dose reduction techniques included automated exposure control or exposure modulation based on body size. Count of known CT and cardiac nuc med studies performed in previous 12 months: 0.     COMPARISON:   None.    FINDINGS:   Prominently dural based lesion is seen  abutting the left posterior cerebellar hemisphere measuring approximately 2 x 1.1 cm that demonstrates enhancement. There also appears to be some potential dural thickening at the posterior apex abutting the  posterior left frontal lobe, although this is somewhat equivocal. There is no definite large intraparenchymal lesion although subtle smaller lesions cannot be entirely excluded. There is no definite osseous abnormality. The sphenoid sinuses are  opacified.      Impression:      Overall, the findings are concerning for potential dural based metastases. The largest abuts the posterior left cerebellum and there is an equivocal second lesion at the left posterior vertex. In addition, small intraparenchymal metastases are not  excluded. Consider follow-up MRI with contrast.    Signer Name: Martina Malin MD   Signed: 8/19/2021 9:21 PM   Workstation Name: [id number]    [facility]

## 2021-08-20 NOTE — PLAN OF CARE
Goal Outcome Evaluation:  Plan of Care Reviewed With: patient         SLP evaluation completed. Will  plan for [facility] upper GI tomorrow . Please see note for further details and recommendations.

## 2021-08-20 NOTE — DISCHARGE PLACEMENT REQUEST
"Shannan Bruno (51 y.o. Female)   Referred 8/20/2021 by Dr. MIL Holbrook  PCP-Dr. Maricel Quinteros  Dx-Metastatic Endometrial Cancer  Tested negative for covid 19 on 8/19/2021    Date of Birth Social Security Number Address Home Phone MRN    1970  [address] [id number] [phone] [id number]    [employer] Marital Status          Yazidism        Admission Date Admission Type Admitting Provider Attending Provider Department, Room/Bed    8/19/21 Urgent Trang Holbrook MD Reddy, Mayuri V, MD [address], S576/1    Discharge Date Discharge Disposition Discharge Destination                       Attending Provider: Trang Holbrook MD    Allergies: Codeine    Isolation: None   Infection: None   Code Status: No CPR    Ht: 175.3 cm (69.02\")   Wt: 91.6 kg (202 lb)    Admission Cmt: None   Principal Problem: None                Active Insurance as of 8/19/2021     Primary Coverage     Payor Plan Insurance Group Employer/Plan Group    ANTHEM BLUE CROSS ANTHEM BLUE CROSS BLUE SHIELD PPO [id number]     Payor Plan Address Payor Plan Phone Number Payor Plan Fax Number Effective Dates    [address] [phone]  5/1/2020 - None Entered    [place] [address]       Subscriber Name Subscriber Birth Date Member ID       MAYA BRUNO 10[birth year]1965 [id number]                 Emergency Contacts      (Rel.) Home Phone Work Phone Mobile Phone    MAYA BRUNO (Spouse) [phone] -- [phone]            Emergency Contact Information     Name Relation Home Work Mobile    MAYA BRUNO Spouse [phone]          Insurance Information                ANTHEM BLUE CROSS/[employer]HEM BLUE CROSS BLUE SHIELD PPO Phone: [phone]    Subscriber: Maya Bruno Subscriber#: [id number]    Group#: [id number] Precert#: [id number]             History & Physical      Jack Flores MD at 08/19/21 [address] [place] [address] Medicine " Services  HISTORY AND PHYSICAL    Patient Name: Shannan Jha  : 1970  MRN: [id number]  Primary Care Physician: Maricel Quinteros MD  Date of admission: 2021    Subjective     Chief Complaint: anemia, weakness    HPI:  Shannan Jha is a 51 y.o. female with PMH significant for HTN, HLD, insulin-dependent DMII and hypothyroidism. On 21 a TVUS revealed 3.2cm endometrial stripe. Biopsy of a cervical polyp was concerning for endometrial adenocarcinoma. She was referred to GYN/ONC and underwent robotic-assisted tumor debulking with total laparoscopic hysterectomy for uterus weighing > 250g, BSO, L ureterolysis, peritoneal stripping, lysis of adhesions, cyscoscopy with L temporary stent placement and biopsy of clitoral mass. Pathology returned with dedifferentiated endometrial carcinoma with involvement of the L fallopian tube and ovary and R periadnexal soft tissue. Clitoral munoz mass with dedifferentiated carcinoma myometrium. CT abdomen/pelvis showed diffuse metastatic disease with parenchymal liver lesions, suspicious for metastasis as well as supraclavicular, L rectopectoral, mediastinal, janice hepatis, retroperitoneal and pelvic adenopathy. Soft tissue mass at peritoneum measuring 4.5 x 4.1 cm.    She was placed on PACLitaxel/CARBOplatin Q21D for metastatic endometrial cancer.   She has since had issues with pain, hyponatremia and anemia.     She was directly admitted to [facility] 21 following an appointment with Dr. Jensen of GYN/ONC. Patient responded minimally and the majority of history was obtained from the patient's  and her chart. Ms. Carpenter has been getting increasingly weak over the past couple of weeks. Her oral intake has been poor. Her pain has been poorly-controlled with her current regimen. No fevers/chills, abdominal pain, nausea or vomiting. No chest pain or dyspnea.  denied confusion but he did note that her speech was difficult to understand  when she first arrived to her hospital room.     CBC prior to admission revealed Hgb 6.4   CMP / Mag / Phos still pending.     Patient and her  tearful regarding goals of care discussions. They are open to palliative care consultation but seemed somewhat overwhelmed when we discussed power of , advanced directive and code status. I suggested that they take some time to think it over and we could revisit this topic in the upcoming says. They were in agreement for FULL CODE at this time.     COVID Details:    Symptoms:    [x] NONE [] Fever []  Cough [] Shortness of breath [] Change in taste/smell      The patient has a COVID HM Topic on their chart, and they are fully vaccinated.    Review of Systems   Constitutional: Positive for activity change, appetite change and fatigue. Negative for chills and fever.   HENT: Negative.    Respiratory: Negative.    Cardiovascular: Positive for leg swelling.   Gastrointestinal: Negative for abdominal pain, constipation, diarrhea, nausea and vomiting.   Genitourinary: Negative for difficulty urinating.   Musculoskeletal: Positive for arthralgias, back pain and neck pain.   Skin: Negative.    Neurological: Positive for weakness.   Hematological: Negative.    Psychiatric/Behavioral: Negative.       All other systems reviewed and are negative.     Personal History     Past Medical History:   Diagnosis Date   • Adenocarcinoma (CMS/HCC)     endometrial   • Anxiety    • Depression    • Diabetes (CMS/HCC)    • GERD (gastroesophageal reflux disease)    • Heart murmur    • Hyperlipidemia    • Hypertension    • Thyroid disease    • Uterine cancer (CMS/HCC)      Past Surgical History:   Procedure Laterality Date   • ENDOSCOPY     • GALLBLADDER SURGERY     • TOTAL LAPAROSCOPIC HYSTERECTOMY SALPINGO OOPHORECTOMY N/A 6/18/2021    Procedure: TOTAL LAPAROSCOPIC RADICAL TUMOR DEBULKING WITH TOTAL HYSTERECTOMY (UTERUS > 250 g), BILATERAL SALPINGO-OOPHORECTOMY, LEFT URETROLYSIS,  PERTIONEAL STRIPPING, LYSIS OF ADHESIONS (>90 MINUTES), CYSTOSCOPY WITH LEFT TEMPORARY STENT PLACEMENT, AND INJECTION FOR SENTINEL LYMPH NODE DISSECTION;  Surgeon: Jolie Jensen MD;  Location: [facility];  Service: Robotics - DaVinci;  Laterality: N/A   • WISDOM TOOTH EXTRACTION       Family History: family history includes Arthritis in her father; Cancer in her father; Diabetes in her brother and mother; Heart attack in her mother; Heart disease in her mother; Stroke in her mother; Thyroid disease in her brother and father. Otherwise pertinent FHx was reviewed and unremarkable.     Social History:  reports that she has never smoked. She has never used smokeless tobacco. She reports that she does not drink alcohol and does not use drugs.  Social History     Social History Narrative   • Not on file     Medications:  Black Cohosh, Dapagliflozin Propanediol, Insulin Regular Human (Conc), Morphine, Omega-3 Fatty Acids, PARoxetine, Senna, acetaminophen, busPIRone, dexamethasone, docusate sodium, gabapentin, glimepiride, ibuprofen, levothyroxine, lisinopril, loratadine, megestrol, metFORMIN ER, mirtazapine, multivitamin, omeprazole, ondansetron, oxyCODONE, oxyCODONE-acetaminophen, polyethylene glycol, prochlorperazine, and rosuvastatin    Allergies   Allergen Reactions   • Codeine Nausea Only and Dizziness     Objective     Vital Signs:   Temp:  [95.7 °F (35.4 °C)-100.1 °F (37.8 °C)] 100.1 °F (37.8 °C)  Heart Rate:  [] 96  Resp:  [14-18] 16  BP: (112-123)/(59-70) 115/68    Physical Exam   Constitutional: Awake, alert and conversant. Sitting on side of bed   Eyes: PERRLA, sclerae anicteric, no conjunctival injection  HENT: NCAT, mucous membranes dry   Neck: Supple, no thyromegaly, no lymphadenopathy, trachea midline  Respiratory: Clear to auscultation bilaterally, nonlabored respirations on room air   Cardiovascular: Regular rhythm, tachycardic with rate 110's. No m/r/g.   Gastrointestinal: Positive bowel sounds,  soft, nontender, mildly distended  Musculoskeletal: 2+ pitting bilateral ankle edema, no clubbing or cyanosis to extremities  Psychiatric: Flat affect, cooperative  Neurologic: Globally weak, speech soft but clear (1-2 word responses), no focal deficits     Results Reviewed:  I have personally reviewed most recent indicated data and agree with findings including:  [x]  Laboratory  []  Radiology  []  EKG/Telemetry  [x]  Pathology  []  Cardiac/Vascular Studies  [x]  Old records  []  Other:    LAB RESULTS:      Lab 08/19/21 1953 08/19/21 1749 08/19/21  1418   WBC  --  4.28 4.70   HEMOGLOBIN  --  6.4* 6.4*   HEMATOCRIT  --  18.9* 18.9*   PLATELETS  --  135* 177   NEUTROS ABS  --  3.03 3.80   IMMATURE GRANS (ABS)  --  0.43*  --    LYMPHS ABS  --  0.41* 0.60*   MONOS ABS  --  0.35 0.30   EOS ABS  --  0.05  --    MCV  --  78.1* 80.7   LACTATE 2.0  --   --          Lab 08/19/21 2202 08/19/21 1749   SODIUM 124* 125*   POTASSIUM 3.9 4.4   CHLORIDE 85* 84*   CO2 25.0 23.0   ANION GAP 14.0 18.0*   BUN 11 13   CREATININE 0.44* 0.49*   GLUCOSE 285* 362*   CALCIUM 8.7 9.4   IONIZED CALCIUM  --  1.22   MAGNESIUM 1.8 1.9   PHOSPHORUS  --  3.7   HEMOGLOBIN A1C  --  7.80*         Lab 08/19/21 1749   TOTAL PROTEIN 6.5   ALBUMIN 3.10*   GLOBULIN 3.4   ALT (SGPT) 22   AST (SGOT) 86*   BILIRUBIN 1.4*   ALK PHOS 330*                 Lab 08/19/21 1749   ABO TYPING O   RH TYPING Positive   ANTIBODY SCREEN Negative         Brief Urine Lab Results  (Last result in the past 365 days)      Color   Clarity   Blood   Leuk Est   Nitrite   Protein   CREAT   Urine HCG        08/19/21 2158 Yellow Clear Moderate (2+) Trace Positive 100 mg/dL (2+)             Microbiology Results (last 10 days)     Procedure Component Value - Date/Time    COVID PRE-OP / PRE-PROCEDURE SCREENING ORDER (NO ISOLATION) - Swab, Nasopharynx [[id number]]  (Normal) Collected: 08/19/21 2015    Lab Status: Final result Specimen: Swab from Nasopharynx Updated: 08/19/21 2034     Narrative:      The following orders were created for panel order COVID PRE-OP / PRE-PROCEDURE SCREENING ORDER (NO ISOLATION) - Swab, Nasopharynx.  Procedure                               Abnormality         Status                     ---------                               -----------         ------                     COVID-19, ABBOTT IN-HOUS...[[id number]]  Normal              Final result                 Please view results for these tests on the individual orders.    COVID-19, ABBOTT IN-HOUSE,NASAL Swab (NO TRANSPORT MEDIA) 2 HR TAT - Swab, Nasopharynx [[id number]]  (Normal) Collected: 08/19/21 2015    Lab Status: Final result Specimen: Swab from Nasopharynx Updated: 08/19/21 2034     COVID19 Presumptive Negative    Narrative:      Fact sheet for providers: https://www.fda.gov/media/201891/download     Fact sheet for patients: https://www.fda.gov/media/412660/download    Test performed by PCR.  If inconsistent with clinical signs and symptoms patient should be tested with different authorized molecular test.        CT Head With & Without Contrast    Result Date: 8/19/2021  HISTORY: Mental status change, metastatic cancer TECHNIQUE: Axial head CT with and without IV contrast. Radiation dose reduction techniques included automated exposure control or exposure modulation based on body size. Count of known CT and cardiac nuc med studies performed in previous 12 months: 0. COMPARISON: None. FINDINGS: Prominently dural based lesion is seen abutting the left posterior cerebellar hemisphere measuring approximately 2 x 1.1 cm that demonstrates enhancement. There also appears to be some potential dural thickening at the posterior apex abutting the posterior left frontal lobe, although this is somewhat equivocal. There is no definite large intraparenchymal lesion although subtle smaller lesions cannot be entirely excluded. There is no definite osseous abnormality. The sphenoid sinuses are opacified.     Impression: Overall,  the findings are concerning for potential dural based metastases. The largest abuts the posterior left cerebellum and there is an equivocal second lesion at the left posterior vertex. In addition, small intraparenchymal metastases are not excluded. Consider follow-up MRI with contrast. Signer Name: Martina Malin MD  Signed: 8/19/2021 9:21 PM  Workstation Name: [id number]  [facility]    CT Abdomen Pelvis With Contrast    Result Date: 8/19/2021  CT Abdomen Pelvis W, CTA Chest INDICATION: Mental status change, endometrial carcinoma, assess for progression TECHNIQUE: CTA of the chest. CT of the abdomen and pelvis without IV contrast. Coronal and sagittal reconstructions were obtained.  Radiation dose reduction techniques included automated exposure control or exposure modulation based on body size. Count of known CT and cardiac nuc med studies performed in previous 12 months: 0. COMPARISON: CT of the abdomen from 7/2/21 FINDINGS: CTA chest: There is no definite evidence of pulmonary embolism although there is somewhat limitation of evaluation for small subsegmental embolus. The patient's left supraclavicular lymphadenopathy and left low cervical lymphadenopathy has significantly increased in size and there is shift of the trachea to the right. Multilevel mediastinal nodes have significantly increased in size and there is significant narrowing of the eleonora and mainstem bronchi bilaterally. The esophageal lumen is also compressed and may be significantly involved. There are new small bilateral pleural effusions, greater on the right as well as multiple new lung metastases noted bilaterally. Bone marrow attenuation is somewhat mottled and underlying metastatic disease cannot be entirely excluded although no definite focal lesion is identified. Abdomen: Multiple new metastatic lesions are noted throughout the liver. Lesions are noted within the bilateral adrenal glands, and the spleen is not well  evaluated due to beam hardening artifact. However, there is suspicion for multiple splenic lesions as well. Multiple large jeana masses are seen scattered throughout the abdomen. There is also flattening of the inferior vena cava secondary to jeana mass. Pelvis: Malignant lesion abuts the wall of the rectum anteriorly. There is a small amount of fluid in the pelvis. Multiple abnormal aortocaval and periaortic nodes are noted. No acute bowel abnormality is appreciated. No acute osseous abnormality.     Impression: 1. There has been gross interval progression of metastatic disease as described above throughout the chest, abdomen and pelvis. 2. There is no definite evidence of pulmonary embolism although there is somewhat limitation of evaluation for small subsegmental embolus. Signer Name: Martina Malin MD  Signed: 8/19/2021 9:31 PM  Workstation Name: [id number]  [facility]    CT Angiogram Chest    Result Date: 8/19/2021  CT Abdomen Pelvis W, CTA Chest INDICATION: Mental status change, endometrial carcinoma, assess for progression TECHNIQUE: CTA of the chest. CT of the abdomen and pelvis without IV contrast. Coronal and sagittal reconstructions were obtained.  Radiation dose reduction techniques included automated exposure control or exposure modulation based on body size. Count of known CT and cardiac nuc med studies performed in previous 12 months: 0. COMPARISON: CT of the abdomen from 7/2/21 FINDINGS: CTA chest: There is no definite evidence of pulmonary embolism although there is somewhat limitation of evaluation for small subsegmental embolus. The patient's left supraclavicular lymphadenopathy and left low cervical lymphadenopathy has significantly increased in size and there is shift of the trachea to the right. Multilevel mediastinal nodes have significantly increased in size and there is significant narrowing of the eleonora and mainstem bronchi bilaterally. The esophageal lumen is also  compressed and may be significantly involved. There are new small bilateral pleural effusions, greater on the right as well as multiple new lung metastases noted bilaterally. Bone marrow attenuation is somewhat mottled and underlying metastatic disease cannot be entirely excluded although no definite focal lesion is identified. Abdomen: Multiple new metastatic lesions are noted throughout the liver. Lesions are noted within the bilateral adrenal glands, and the spleen is not well evaluated due to beam hardening artifact. However, there is suspicion for multiple splenic lesions as well. Multiple large jeana masses are seen scattered throughout the abdomen. There is also flattening of the inferior vena cava secondary to jeana mass. Pelvis: Malignant lesion abuts the wall of the rectum anteriorly. There is a small amount of fluid in the pelvis. Multiple abnormal aortocaval and periaortic nodes are noted. No acute bowel abnormality is appreciated. No acute osseous abnormality.     Impression: 1. There has been gross interval progression of metastatic disease as described above throughout the chest, abdomen and pelvis. 2. There is no definite evidence of pulmonary embolism although there is somewhat limitation of evaluation for small subsegmental embolus. Signer Name: Martina Malin MD  Signed: 8/19/2021 9:31 PM  Workstation Name: [id number]  [facility]      Assessment & Plan       Endometrial cancer (CMS/HCC)    Type 2 diabetes mellitus (CMS/HCC)    Dehydration    Drug induced constipation    Cancer related pain    Acute anemia    Generalized weakness    Endometrial cancer, FIGO stage IVB (CMS/HCC)       Shannan Jha is a 51 y.o. female w/ hx insulin dep DM2, hypothyroidism who recently was diagnosed w/ endometrial cancer 5/20/21 w/ biopsy of cervical polyp (after ultrasound revealed thickened endometrial stripe). On 6/18/21 patient nderwent robitic assisted laparoscopic SUSHILA, BSO, left  ureterolysis, peritoneal stripping, sussy, cystoscopy w/ temporary stent placement biopsy of clitoral mass. Pathology revealed endometrial carcinoma w/ subsequent ct scan 7/2/21 revealing diffuse metastatic disease involving liver, diffuse adenopathy (left supraclavicular, left retropectoral, mediastinal, janice hepatis, retroperitoneum, and pelvis). Patient underwent chemotherapy on 7/15/21 (paclitaxel and carboplatin). Patient has developed hyponatremia and anemia. Labs on 8/12/21 revealed sodium 124 (w/ glucose 331) and hgb 7.3. patient.        Presented back to Dr. Jensen's office 8/19/21 w/ worsening fatigue, confusion, ataxia and worsening right neck pain radiating to her head and back, having not eaten much in the two days prior related to difficulty swallowing and choking when she eats or drinks, along with some dyspnea. hgb was noted to be 6.4 and arrangements were made for direct admission to hospitalist service. Subsequent labwork revealed sodium 125, glucose 362, serum bicarb 23 w/ anion gap 18. Normal lactate. a1c 7.8. mildly elevated bilirubin 1.4. patient was ordered iv fluids, 2 units or blood, and frequent glucose monitoring & correction insulin. Patient developed spells of somnolence, during which patient would close her eyes but was difficult to arouse, brief in duratoin. No tonic/clonic activity, no rigidity, no starting spells, no focal weakness. Patient had another spell lasting 5-10 minutes and was given keppra 1000mg. Ct head/chest/abd/pelvis w/ contrast was obtained.   Ct head revealed probable dural based metastatic disease, largest 2 x 1.1 cm dural based lesion abutting left posterior cerebellar hemisphere w/ enhancement, and some potential dural thickening posterior apex abutting the posterior left frontal lobe. Ct c/a/p revealed no pulmonary embolism, but showed significant diffuse interval progression of metastatic disease including progression of supraclavicular and cervical  lymphadenopathy w/ shift of trachea to right, mediastinal adenopathy w/ narrowing of eleonora and bilateral mainstem bronchi, esophageal lumen compression; new small bilateral effusions w/ new multiple lung mets, worsening dz throughout the liver, splenic lesions, and large jeana masses seen throughout the abdomen and flattening of the inferior vena cava secondary to jeana mass; malignant lesion also abuts the wall of rectum anterioly. Was initiated on decadron 4mg iv q6h and mri brain w & w/o ordered. I had extensive discussion w/ patient and  at bedside and discussed results of the scans.   Due to rapid and diffuse progression of metastatic disease (including brain) we discussed the poor prognosis, and  (patient confused) stated that in event of cardiopulmonary arrest would not wish to pursue mechanical ventilation or cpr, as such patient DNR/DNI, however supporting otherwise at this point. Subsequent urinalysis revealed uti and rocephin was initiated. Palliative consulted for morning and Dr. Jensen consulted as well        Rapidly Progressive Metastatic Endometrial Cancer  Encephalopathy/confusion (due to brain mets, +/- subclinical seizure, + uti, + hyponatremia)  Malignancy related pain  -CT scans suggesting new brain mets, new lung mets, adenopathy (including mediastinal) w/ mainstem bronchi & tracheal displacement, diffuse liver disease, suggestion of splenic dz, and adnopathy displacing the inferior vena cava  -mri brain ordered  -keppra empiric rx  -decadron 4mg iv q6h  -Dr. Jensen consulted for morning  -Palliative consulted (goals/limits, symptoms)  Anemia, likely related to chemotherapy  -hgb 6.4  -transufins 2 unit prbc  Hyponatremia  -q4h bmp  -ns 75cc/hr, monitor  Uncontrolled hyperglycemia/DM2  -hasn't been taking her insulin; currently worsened by steroids  -q3h fsbs w/ medium correction humalog  UTI, poa  -rocephin day #1, follow culture  Dysphagia  -SLP  -likely due to thrush and  esophageal displacement due to cancer/adenopathy  Thrush  -single dose micafungin (couldn't give diflucan due to other home meds)  -nystatin s&s      DVT prophylaxis: mechanical    *total of 70 minutes care provided, >50% spent at bedside counseling patient &     CODE STATUS:    Code Status and Medical Interventions:   Ordered at: 08/19/21 2240     Limited Support to NOT Include:    Intubation     Code Status:    No CPR     Medical Interventions (Level of Support Prior to Arrest):    Limited     This note has been completed as part of a split-shared workflow.     Electronically signed by Perla Ambrose PA-C, 08/19/21, 5:30 PM EDT.          Attending   Admission Attestation       I have seen and examined the patient, performing an independent face-to-face diagnostic evaluation with plan of care reviewed and developed with the advanced practice clinician (APC).      Brief Summary Statement:   Shannan Jha is a 51 y.o. female w/ hx insulin dep DM2, hypothyroidism who recently was diagnosed w/ endometrial cancer 5/20/21 w/ biopsy of cervical polyp (after ultrasound revealed thickened endometrial stripe). On 6/18/21 patient nderwent robitic assisted laparoscopic SUSHILA, BSO, left ureterolysis, peritoneal stripping, sussy, cystoscopy w/ temporary stent placement biopsy of clitoral mass. Pathology revealed endometrial carcinoma w/ subsequent ct scan 7/2/21 revealing diffuse metastatic disease involving liver, diffuse adenopathy (left supraclavicular, left retropectoral, mediastinal, janice hepatis, retroperitoneum, and pelvis). Patient underwent chemotherapy on 7/15/21 (paclitaxel and carboplatin). Patient has developed hyponatremia and anemia. Labs on 8/12/21 revealed sodium 124 (w/ glucose 331) and hgb 7.3. patient.        Presented back to Dr. Jensen's office 8/19/21 w/ worsening fatigue, confusion, ataxia and worsening right neck pain radiating to her head and back, having not eaten much in the two days prior  related to difficulty swallowing and choking when she eats or drinks, along with some dyspnea. hgb was noted to be 6.4 and arrangements were made for direct admission to hospitalist service. Subsequent labwork revealed sodium 125, glucose 362, serum bicarb 23 w/ anion gap 18. Normal lactate. a1c 7.8. mildly elevated bilirubin 1.4. patient was ordered iv fluids, 2 units or blood, and frequent glucose monitoring & correction insulin. Patient developed spells of somnolence, during which patient would close her eyes but was difficult to arouse, brief in duratoin. No tonic/clonic activity, no rigidity, no starting spells, no focal weakness. Patient had another spell lasting 5-10 minutes and was given keppra 1000mg. Ct head/chest/abd/pelvis w/ contrast was obtained.   Ct head revealed probable dural based metastatic disease, largest 2 x 1.1 cm dural based lesion abutting left posterior cerebellar hemisphere w/ enhancement, and some potential dural thickening posterior apex abutting the posterior left frontal lobe. Ct c/a/p revealed no pulmonary embolism, but showed significant diffuse interval progression of metastatic disease including progression of supraclavicular and cervical lymphadenopathy w/ shift of trachea to right, mediastinal adenopathy w/ narrowing of eleonora and bilateral mainstem bronchi, esophageal lumen compression; new small bilateral effusions w/ new multiple lung mets, worsening dz throughout the liver, splenic lesions, and large jeana masses seen throughout the abdomen and flattening of the inferior vena cava secondary to jeana mass; malignant lesion also abuts the wall of rectum anterioly. Was initiated on decadron 4mg iv q6h and mri brain w & w/o ordered. I had extensive discussion w/ patient and  at bedside and discussed results of the scans.   Due to rapid and diffuse progression of metastatic disease (including brain) we discussed the poor prognosis, and  (patient confused) stated  that in event of cardiopulmonary arrest would not wish to pursue mechanical ventilation or cpr, as such patient DNR/DNI, however supporting otherwise at this point. Subsequent urinalysis revealed uti and rocephin was initiated. Palliative consulted for morning and Dr. Jensen consulted as well          Remainder of detailed HPI is as noted by APC and has been reviewed and/or edited by me for completeness.    Attending Physical Exam:  Constitutional: alert, confused, somewhat tangential speech, no distress  Psych:somewhat flat  HEENT:Ncat, oroph clear  Neck: neck supple, full range of motion  Neuro: face symmetric, speech clear (content tangential), oriented to person, place; right pupil slightly larger than left w/ both pupils reactive, eomi; moves all extremities, neck supple  Cardiac: Rrr; No pretibial pitting edema  Resp: Ctab, normal effort  GI: abd soft, nontender, obese  Skin: No extremity rash  Musculoskeletal/extremities: no cyanosis extremities; no significant ankle edema        Brief Assessment/Plan :  See detailed assessment and plan developed with APC which I have reviewed and/or edited for completeness.        Admission Status: I believe that this patient meets inpatient status      Jack Flores MD  08/20/21                      Electronically signed by Jack Flores MD at 08/20/21 0225       Emergency Department Notes    No notes of this type exist for this encounter.           Current Facility-Administered Medications   Medication Dose Route Frequency Provider Last Rate Last Admin   • busPIRone (BUSPAR) tablet 5 mg  5 mg Oral TID Jack Flores MD       • castor oil-balsam peru (VENELEX) ointment   Topical Q12H Trang Holbrook MD   Given at 08/20/21 1226   • cefTRIAXone (ROCEPHIN) 1 g/100 mL 0.9% NS (MBP)  1 g Intravenous Q24H Varsha Sal APRN   1 g at 08/19/21 2342   • dexamethasone (DECADRON) injection 4 mg  4 mg Intravenous Q6H Jack Flores MD   4 mg at 08/20/21  1226   • dextrose (D50W) 25 g/ 50mL Intravenous Solution 25 g  25 g Intravenous Q15 Min PRN Jack Flores MD       • dextrose (GLUTOSE) oral gel 15 g  15 g Oral Q15 Min PRN Jack Flores MD       • docusate sodium (COLACE) capsule 200 mg  200 mg Oral Daily Perla Ambrose PA-C   200 mg at 08/19/21 1755   • gabapentin (NEURONTIN) capsule 300 mg  300 mg Oral TID Jack Flores MD       • glucagon (human recombinant) (GLUCAGEN DIAGNOSTIC) injection 1 mg  1 mg Subcutaneous Q15 Min PRN Jack Flores MD       • insulin lispro (humaLOG) injection 0-14 Units  0-14 Units Subcutaneous Q3H Jack Flores MD   5 Units at 08/20/21 1524   • levETIRAcetam in NaCl 0.75% (KEPPRA) IVPB 1,000 mg  1,000 mg Intravenous Q12H Jack Flores  mL/hr at 08/20/21 0538 1,000 mg at 08/20/21 0538   • levothyroxine (SYNTHROID, LEVOTHROID) tablet 250 mcg  250 mcg Oral Once per day on Mon Tue Wed Thu Fri Sat Perla Ambrose PA-C       • magnesium sulfate 4 gram infusion - Mg less than or equal to 1mg/dL  4 g Intravenous PRN Jack Flores MD        Or   • magnesium sulfate 3 gram infusion (1gm x 3) - Mg 1.1 - 1.5 mg/dL  1 g Intravenous PRN Jack Flores MD        Or   • Magnesium Sulfate 2 gram infusion- Mg 1.6 - 1.9 mg/dL  2 g Intravenous PRN Jack Flores MD       • megestrol (MEGACE) tablet 80 mg  80 mg Oral BID Perla Ambrose PA-C       • Morphine (MS CONTIN) 12 hr tablet 30 mg  30 mg Oral BID Perla Ambrose PA-C       • morphine injection 2 mg  2 mg Intravenous Q4H PRN Trnag Holbrook MD       • nystatin (MYCOSTATIN) 376107 UNIT/ML suspension 500,000 Units  5 mL Swish & Swallow 4x Daily Perla Ambrose PA-C   500,000 Units at 08/20/21 1226   • ondansetron (ZOFRAN) tablet 4 mg  4 mg Oral Q6H PRN Perla Ambrose PA-C        Or   • ondansetron (ZOFRAN) injection 4 mg  4 mg Intravenous Q6H PRN Perla Ambrose PA-C   4 mg at 08/20/21  1226   • oxyCODONE (ROXICODONE) immediate release tablet 10 mg  10 mg Oral Q6H PRN Perla Ambrose PA-C   10 mg at 21 1755   • pantoprazole (PROTONIX) EC tablet 40 mg  40 mg Oral BID AC Perla Ambrose PA-C   40 mg at 21 1755   • PARoxetine (PAXIL) tablet 20 mg  20 mg Oral Daily Jack Flores MD       • polyethylene glycol (MIRALAX) packet 17 g  17 g Oral Daily Perla Ambrose PA-C   17 g at 21 175   • senna (SENOKOT) tablet 2 tablet  2 tablet Oral Nightly Perla Ambrose PA-C       • sodium chloride 0.9 % flush 10 mL  10 mL Intravenous Q12H Perla Ambrose PA-C   10 mL at 21 0923   • sodium chloride 0.9 % flush 10 mL  10 mL Intravenous PRN Perla Ambrose PA-C       • sodium chloride 0.9 % infusion  75 mL/hr Intravenous Continuous Jack Flores MD 75 mL/hr at 21 1239 75 mL/hr at 21 1239        Physician Progress Notes (last 72 hours) (Notes from 21 1545 through 21 1545)      Trang Holbrook MD at 21 0744              [facility] Medicine Services  PROGRESS NOTE    Patient Name: Shannan Jha  : 1970  MRN: [id number]    Date of Admission: 2021  Primary Care Physician: Maricel Quinteros MD    Subjective   Subjective     CC:  F/U FTT    HPI:  Patient seen this morning. Currently denies pain.     ROS:  CV-no chest pain, no palpitations  Resp-no cough, no dyspnea  GI-no N/V/D, no abd pain    All other systems reviewed and negative except any additional pertinent positives and negatives as discussed in HPI.       Objective   Objective     Vital Signs:   Temp:  [95.7 °F (35.4 °C)-100.1 °F (37.8 °C)] 98.5 °F (36.9 °C)  Heart Rate:  [] 91  Resp:  [14-18] 18  BP: (102-123)/(58-70) 102/58     Physical Exam:  Gen-no acute distress, pale appearance  HENT-NCAT, mucous membranes moist  CV-RRR, S1 S2 normal, no m/r/g  Resp-CTAB, no wheezes or rales  Abd-soft, NT, ND,  +BS  Ext-+BLE edema  Neuro-awake, alert, answers simple questions, no focal deficits  Skin-no rashes  Psych-flat affect      Results Reviewed:  LAB RESULTS:      Lab 08/20/21 0439 08/19/21 1953 08/19/21 1749 08/19/21  1418   WBC 3.72  --  4.28 4.70   HEMOGLOBIN 7.3*  --  6.4* 6.4*   HEMATOCRIT 22.2*  --  18.9* 18.9*   PLATELETS 100*  --  135* 177   NEUTROS ABS  --   --  3.03 3.80   IMMATURE GRANS (ABS)  --   --  0.43*  --    LYMPHS ABS  --   --  0.41* 0.60*   MONOS ABS  --   --  0.35 0.30   EOS ABS  --   --  0.05  --    MCV 84.7  --  78.1* 80.7   LACTATE  --  2.0  --   --          Lab 08/20/21 0439 08/19/21 2202 08/19/21 1749   SODIUM 125* 124* 125*   POTASSIUM 3.9 3.9 4.4   CHLORIDE 86* 85* 84*   CO2 20.0* 25.0 23.0   ANION GAP 19.0* 14.0 18.0*   BUN 10 11 13   CREATININE 0.35* 0.44* 0.49*   GLUCOSE 273* 285* 362*   CALCIUM 8.7 8.7 9.4   IONIZED CALCIUM  --   --  1.22   MAGNESIUM  --  1.8 1.9   PHOSPHORUS  --   --  3.7   HEMOGLOBIN A1C  --   --  7.80*   TSH 18.690*  --   --          Lab 08/19/21 1749   TOTAL PROTEIN 6.5   ALBUMIN 3.10*   GLOBULIN 3.4   ALT (SGPT) 22   AST (SGOT) 86*   BILIRUBIN 1.4*   ALK PHOS 330*                 Lab 08/19/21 1749   ABO TYPING O   RH TYPING Positive   ANTIBODY SCREEN Negative         Brief Urine Lab Results  (Last result in the past 365 days)      Color   Clarity   Blood   Leuk Est   Nitrite   Protein   CREAT   Urine HCG        08/20/21 0622 Yellow Clear Negative Trace Negative 30 mg/dL (1+)               Microbiology Results Abnormal     Procedure Component Value - Date/Time    COVID PRE-OP / PRE-PROCEDURE SCREENING ORDER (NO ISOLATION) - Swab, Nasopharynx [[id number]]  (Normal) Collected: 08/19/21 2015    Lab Status: Final result Specimen: Swab from Nasopharynx Updated: 08/19/21 2034    Narrative:      The following orders were created for panel order COVID PRE-OP / PRE-PROCEDURE SCREENING ORDER (NO ISOLATION) - Swab, Nasopharynx.  Procedure                                Abnormality         Status                     ---------                               -----------         ------                     COVID-19, ABBOTT IN-HOUS...[[id number]]  Normal              Final result                 Please view results for these tests on the individual orders.    COVID-19, ABBOTT IN-HOUSE,NASAL Swab (NO TRANSPORT MEDIA) 2 HR TAT - Swab, Nasopharynx [[id number]]  (Normal) Collected: 08/19/21 2015    Lab Status: Final result Specimen: Swab from Nasopharynx Updated: 08/19/21 2034     COVID19 Presumptive Negative    Narrative:      Fact sheet for providers: https://www.fda.gov/media/598549/download     Fact sheet for patients: https://www.fda.gov/media/792361/download    Test performed by PCR.  If inconsistent with clinical signs and symptoms patient should be tested with different authorized molecular test.          CT Head With & Without Contrast    Result Date: 8/19/2021  HISTORY: Mental status change, metastatic cancer TECHNIQUE: Axial head CT with and without IV contrast. Radiation dose reduction techniques included automated exposure control or exposure modulation based on body size. Count of known CT and cardiac nuc med studies performed in previous 12 months: 0. COMPARISON: None. FINDINGS: Prominently dural based lesion is seen abutting the left posterior cerebellar hemisphere measuring approximately 2 x 1.1 cm that demonstrates enhancement. There also appears to be some potential dural thickening at the posterior apex abutting the posterior left frontal lobe, although this is somewhat equivocal. There is no definite large intraparenchymal lesion although subtle smaller lesions cannot be entirely excluded. There is no definite osseous abnormality. The sphenoid sinuses are opacified.     Impression: Overall, the findings are concerning for potential dural based metastases. The largest abuts the posterior left cerebellum and there is an equivocal second lesion at the left posterior vertex.  In addition, small intraparenchymal metastases are not excluded. Consider follow-up MRI with contrast. Signer Name: Martina Malin MD  Signed: 8/19/2021 9:21 PM  Workstation Name: [id number]  [facility]    CT Abdomen Pelvis With Contrast    Result Date: 8/19/2021  CT Abdomen Pelvis W, CTA Chest INDICATION: Mental status change, endometrial carcinoma, assess for progression TECHNIQUE: CTA of the chest. CT of the abdomen and pelvis without IV contrast. Coronal and sagittal reconstructions were obtained.  Radiation dose reduction techniques included automated exposure control or exposure modulation based on body size. Count of known CT and cardiac nuc med studies performed in previous 12 months: 0. COMPARISON: CT of the abdomen from 7/2/21 FINDINGS: CTA chest: There is no definite evidence of pulmonary embolism although there is somewhat limitation of evaluation for small subsegmental embolus. The patient's left supraclavicular lymphadenopathy and left low cervical lymphadenopathy has significantly increased in size and there is shift of the trachea to the right. Multilevel mediastinal nodes have significantly increased in size and there is significant narrowing of the eleonora and mainstem bronchi bilaterally. The esophageal lumen is also compressed and may be significantly involved. There are new small bilateral pleural effusions, greater on the right as well as multiple new lung metastases noted bilaterally. Bone marrow attenuation is somewhat mottled and underlying metastatic disease cannot be entirely excluded although no definite focal lesion is identified. Abdomen: Multiple new metastatic lesions are noted throughout the liver. Lesions are noted within the bilateral adrenal glands, and the spleen is not well evaluated due to beam hardening artifact. However, there is suspicion for multiple splenic lesions as well. Multiple large jeana masses are seen scattered throughout the abdomen. There is  also flattening of the inferior vena cava secondary to jeana mass. Pelvis: Malignant lesion abuts the wall of the rectum anteriorly. There is a small amount of fluid in the pelvis. Multiple abnormal aortocaval and periaortic nodes are noted. No acute bowel abnormality is appreciated. No acute osseous abnormality.     Impression: 1. There has been gross interval progression of metastatic disease as described above throughout the chest, abdomen and pelvis. 2. There is no definite evidence of pulmonary embolism although there is somewhat limitation of evaluation for small subsegmental embolus. Signer Name: Martina Malin MD  Signed: 8/19/2021 9:31 PM  Workstation Name: [id number]  [facility] [place]    CT Angiogram Chest    Result Date: 8/19/2021  CT Abdomen Pelvis W, CTA Chest INDICATION: Mental status change, endometrial carcinoma, assess for progression TECHNIQUE: CTA of the chest. CT of the abdomen and pelvis without IV contrast. Coronal and sagittal reconstructions were obtained.  Radiation dose reduction techniques included automated exposure control or exposure modulation based on body size. Count of known CT and cardiac nuc med studies performed in previous 12 months: 0. COMPARISON: CT of the abdomen from 7/2/21 FINDINGS: CTA chest: There is no definite evidence of pulmonary embolism although there is somewhat limitation of evaluation for small subsegmental embolus. The patient's left supraclavicular lymphadenopathy and left low cervical lymphadenopathy has significantly increased in size and there is shift of the trachea to the right. Multilevel mediastinal nodes have significantly increased in size and there is significant narrowing of the eleonora and mainstem bronchi bilaterally. The esophageal lumen is also compressed and may be significantly involved. There are new small bilateral pleural effusions, greater on the right as well as multiple new lung metastases noted bilaterally. Bone marrow  attenuation is somewhat mottled and underlying metastatic disease cannot be entirely excluded although no definite focal lesion is identified. Abdomen: Multiple new metastatic lesions are noted throughout the liver. Lesions are noted within the bilateral adrenal glands, and the spleen is not well evaluated due to beam hardening artifact. However, there is suspicion for multiple splenic lesions as well. Multiple large jeana masses are seen scattered throughout the abdomen. There is also flattening of the inferior vena cava secondary to jeana mass. Pelvis: Malignant lesion abuts the wall of the rectum anteriorly. There is a small amount of fluid in the pelvis. Multiple abnormal aortocaval and periaortic nodes are noted. No acute bowel abnormality is appreciated. No acute osseous abnormality.     Impression: 1. There has been gross interval progression of metastatic disease as described above throughout the chest, abdomen and pelvis. 2. There is no definite evidence of pulmonary embolism although there is somewhat limitation of evaluation for small subsegmental embolus. Signer Name: Martina Malin MD  Signed: 8/19/2021 9:31 PM  Workstation Name: [id number]  [facility] [place]          I have reviewed the medications:  Scheduled Meds:busPIRone, 5 mg, Oral, TID  cefTRIAXone, 1 g, Intravenous, Q24H  dexamethasone, 4 mg, Intravenous, Q6H  docusate sodium, 200 mg, Oral, Daily  gabapentin, 300 mg, Oral, TID  insulin lispro, 0-14 Units, Subcutaneous, Q3H  levETIRAcetam, 1,000 mg, Intravenous, Q12H  levothyroxine, 250 mcg, Oral, Once per day on Mon Tue Wed Thu Fri Sat  megestrol, 80 mg, Oral, BID  Morphine, 30 mg, Oral, BID  nystatin, 5 mL, Swish & Swallow, 4x Daily  pantoprazole, 40 mg, Oral, BID AC  PARoxetine, 20 mg, Oral, Daily  polyethylene glycol, 17 g, Oral, Daily  senna, 2 tablet, Oral, Nightly  sodium chloride, 10 mL, Intravenous, Q12H      Continuous Infusions:sodium chloride, 75 mL/hr, Last Rate: 75  mL/hr (08/19/21 1941)      PRN Meds:.dextrose  •  dextrose  •  glucagon (human recombinant)  •  magnesium sulfate **OR** magnesium sulfate in D5W 1g/100mL (PREMIX) **OR** magnesium sulfate  •  ondansetron **OR** ondansetron  •  oxyCODONE  •  sodium chloride    Assessment/Plan   Assessment & Plan     Active Hospital Problems    Diagnosis  POA   • Acute anemia [D64.9]  Yes   • Generalized weakness [R53.1]  Yes   • Endometrial cancer, FIGO stage IVB (CMS/HCC) [C54.1]  Yes   • Drug induced constipation [K59.03]  Yes   • Cancer related pain [G89.3]  Yes   • Dehydration [E86.0]  Yes   • Type 2 diabetes mellitus (CMS/HCC) [E11.9]  Yes   • Endometrial cancer (CMS/HCC) [C54.1]  Yes      Resolved Hospital Problems   No resolved problems to display.        Brief Hospital Course to date:  Shannan Jha is a 51 y.o. female with hx of HTN, HLD, DM2, hypothyroidism, and recently diagnosed metastatic endometrial cancer in May 2021, undergoing treatment with Dr. Jensen with Paclitaxel/Carboplatin, who presents from Dr. Jensen's office due to worsening weakness, decreased oral intake, pain, and lab work showing worsening anemia. Admitted to hospitalist service. Further lab work revealed hyponatremia with Na 125 along with UTI. CT head/chest/abd/pelvis revealed new brain metastases as well as interval progression of metastatic disease throughout the chest, abdomen, and pelvis.    *All problems are new to me today.    Metastatic endometrial cancer, rapidly progressive  Encephalopathy/confusion  Malignancy-related pain  --CT scans suggesting new brain mets, lung mets, adenopathy with mainstem bronchi and tracheal displacement, diffuse liver disease, and adenopathy displacing the inferior vena cava.  --Patient refused MRI brain.  --Continue empiric Keppra.  --Continue Decadron.  --Dr. Jensen consulted. Has discussed with patient and family. They would like to go home with hospice.  --Palliative Care and Hospice  consulted.    Anemia  --Likely related to chemotherapy.  --Transfuse 2 units PRBC.    Hyponatremia  --Likely secondary to volume depletion/poor oral intake.  --Gentle IV fluids.  --Monitor closely.     UTI  --Continue Rocephin.  --F/U urine culture.    DM2 with hyperglycemia  --HbA1c 7.8%.    Thrush  --Nystatin swish and swallow.    DVT prophylaxis:  Mechanical DVT prophylaxis orders are present.            Disposition: I expect the patient to be discharged home with hospice possibly over the weekend?    CODE STATUS:   Code Status and Medical Interventions:   Ordered at: 21 0710     Limited Support to NOT Include:    Intubation    Cardioversion/Defibrillation     Code Status:    No CPR     Medical Interventions (Level of Support Prior to Arrest):    Limited       Trang Holbrook MD  21                Electronically signed by Trang Holbrook MD at 21 1430          Consult Notes (last 72 hours) (Notes from 21 1545 through 21 1545)      Jolie Jensen MD at 21 1359               Gynecologic Oncology Admission H&P     Patient Name: Shannan Jha  : 1970   MRN: [id number]     Reason for Consultation: metastatic uterine cancer    History of Present Illness: Shannan Jha is a 51 y.o. female who presented to my office on 2021 for failure to thrive, weakness, and pain. She was found to have a persistently low hemoglobin of 6.4 despite multiple transfusions. She also reported a weeklong history of falls, dizziness, changes in her vision and balance issues.  Her speech was also somewhat garbled occasionally.  She also had not eaten in the past 2 days mostly due to difficulty swallowing as well as decreased appetite.  Given his consolation of findings I recommended admission to which the patient was amenable for.  She was admitted last night and underwent CT scans that demonstrate rapidly progressive disease as well as concern for brain lesions.    Oncologic  History:  Oncology/Hematology History   Endometrial cancer (CMS/HCC)   6/3/2021 Initial Diagnosis    Referred by Dr. Nancy Pearce    5/20/2021: TVUS with uterus measuring 3.4 x 6.8 x 6.4 cm with an endometrial stripe thickness of 3.2 mm.  Left and right ovaries normal.  5/20/2021: Biopsy of cervical polyp demonstrates carcinoma most consistent with endometrial adenocarcinoma     6/18/2021 Surgery    Robotic assisted radical tumor debulking with total laparoscopic hysterectomy for uterus weighing greater than 250 g, bilateral salpingo-oophorectomy, left ureterolysis, peritoneal stripping, lysis of adhesions, cystoscopy with left temporary stent placement, and biopsy of clitoral mass.    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  Right fallopian tube and ovary negative.  Extensive LVSI noted.  Clitoral munoz mass with dedifferentiated carcinoma myometrium.    [facility]       7/2/2021 Imaging    Stat CT scans ordered for enlarging chest wall mass noted at problem visit.    CT C/A/P with diffuse metastatic disease.  Parenchymal liver lesions suspicious for metastases noted.  Adenopathy noted in the left supraclavicular, left retropectoral, mediastinum, janice hepatis, retroperitoneum and pelvis.  Pulmonary nodules concerning for metastasis.  Lobulated soft tissue mass in the left upper pelvis most likely lymph node.  Soft tissue mass at peritoneum measuring 4.5 x 4.1 cm corresponding to the palpable clitoral mass.     7/2/2021 Cancer Staged    Staging form: Corpus Uteri - Carcinoma And Carcinosarcoma, AJCC 8th Edition  - Clinical stage from 7/2/2021: FIGO Stage IVB (cN2a, pM1) - Signed by Jolie Jensen MD on 7/6/2021     7/15/2021 -  Chemotherapy    OP UTERINE PACLitaxel / CARBOplatin (Q21D)          Past Medical History:   Past Medical History:    Diagnosis Date   • Adenocarcinoma (CMS/HCC)     endometrial   • Anxiety    • Depression    • Diabetes (CMS/HCC)    • GERD (gastroesophageal reflux disease)    • Heart murmur    • Hyperlipidemia    • Hypertension    • Thyroid disease    • Uterine cancer (CMS/HCC)        Past Surgical History:   Past Surgical History:   Procedure Laterality Date   • ENDOSCOPY     • GALLBLADDER SURGERY     • TOTAL LAPAROSCOPIC HYSTERECTOMY SALPINGO OOPHORECTOMY N/A 6/18/2021    Procedure: TOTAL LAPAROSCOPIC RADICAL TUMOR DEBULKING WITH TOTAL HYSTERECTOMY (UTERUS > 250 g), BILATERAL SALPINGO-OOPHORECTOMY, LEFT URETROLYSIS, PERTIONEAL STRIPPING, LYSIS OF ADHESIONS (>90 MINUTES), CYSTOSCOPY WITH LEFT TEMPORARY STENT PLACEMENT, AND INJECTION FOR SENTINEL LYMPH NODE DISSECTION;  Surgeon: Jolie Jensen MD;  Location: [facility];  Service: Robotics - DaVinci;  Laterality: N/A   • WISDOM TOOTH EXTRACTION         Family History:   Family History   Problem Relation Age of Onset   • Diabetes Mother    • Heart attack Mother    • Heart disease Mother    • Stroke Mother    • Arthritis Father    • Cancer Father         Bladder   • Thyroid disease Father    • Diabetes Brother    • Thyroid disease Brother        Social History:   Social History     Socioeconomic History   • Marital status:      Spouse name: Not on file   • Number of children: Not on file   • Years of education: Not on file   • Highest education level: Not on file   Tobacco Use   • Smoking status: Never Smoker   • Smokeless tobacco: Never Used   Vaping Use   • Vaping Use: Never used   Substance and Sexual Activity   • Alcohol use: Never   • Drug use: Never   • Sexual activity: Defer     Medications:     Current Facility-Administered Medications:   •  busPIRone (BUSPAR) tablet 5 mg, 5 mg, Oral, TID, Jack Flores MD  •  castor oil-balsam peru (VENELEX) ointment, , Topical, Q12H, Trang Holbrook MD, Given at 08/20/21 1226  •  cefTRIAXone (ROCEPHIN) 1 g/100 mL 0.9% NS  (MBP), 1 g, Intravenous, Q24H, Varsha Sal, APRN, 1 g at 08/19/21 2342  •  dexamethasone (DECADRON) injection 4 mg, 4 mg, Intravenous, Q6H, Jack Flores MD, 4 mg at 08/20/21 1226  •  dextrose (D50W) 25 g/ 50mL Intravenous Solution 25 g, 25 g, Intravenous, Q15 Min PRN, Jack Flores MD  •  dextrose (GLUTOSE) oral gel 15 g, 15 g, Oral, Q15 Min PRN, Jack Flores MD  •  docusate sodium (COLACE) capsule 200 mg, 200 mg, Oral, Daily, Perla Ambrose PA-C, 200 mg at 08/19/21 1755  •  gabapentin (NEURONTIN) capsule 300 mg, 300 mg, Oral, TID, Jack Flores MD  •  glucagon (human recombinant) (GLUCAGEN DIAGNOSTIC) injection 1 mg, 1 mg, Subcutaneous, Q15 Min PRN, Jack Flores MD  •  insulin lispro (humaLOG) injection 0-14 Units, 0-14 Units, Subcutaneous, Q3H, Jack Flores MD, 8 Units at 08/20/21 1239  •  levETIRAcetam in NaCl 0.75% (KEPPRA) IVPB 1,000 mg, 1,000 mg, Intravenous, Q12H, Jack Flores MD, Last Rate: 400 mL/hr at 08/20/21 0538, 1,000 mg at 08/20/21 0538  •  levothyroxine (SYNTHROID, LEVOTHROID) tablet 250 mcg, 250 mcg, Oral, Once per day on Mon Tue Wed Thu Fri Sat, Perla Ambrose PA-C  •  magnesium sulfate 4 gram infusion - Mg less than or equal to 1mg/dL, 4 g, Intravenous, PRN **OR** magnesium sulfate 3 gram infusion (1gm x 3) - Mg 1.1 - 1.5 mg/dL, 1 g, Intravenous, PRN **OR** Magnesium Sulfate 2 gram infusion- Mg 1.6 - 1.9 mg/dL, 2 g, Intravenous, PRN, Jack Flores MD  •  megestrol (MEGACE) tablet 80 mg, 80 mg, Oral, BID, Perla Ambrose PA-C  •  Morphine (MS CONTIN) 12 hr tablet 30 mg, 30 mg, Oral, BID, Perla Ambrose PA-C  •  nystatin (MYCOSTATIN) 212982 UNIT/ML suspension 500,000 Units, 5 mL, Swish & Swallow, 4x Daily, Perla Ambrose PA-C, 500,000 Units at 08/20/21 1226  •  ondansetron (ZOFRAN) tablet 4 mg, 4 mg, Oral, Q6H PRN **OR** ondansetron (ZOFRAN) injection 4 mg, 4 mg, Intravenous, Q6H PRN, Halie  Perla VELASCO PA-C, 4 mg at 08/20/21 1226  •  oxyCODONE (ROXICODONE) immediate release tablet 10 mg, 10 mg, Oral, Q6H PRN, Perla Ambrose PA-C, 10 mg at 08/19/21 1755  •  pantoprazole (PROTONIX) EC tablet 40 mg, 40 mg, Oral, BID AC, Perla Ambrose PA-C, 40 mg at 08/19/21 1755  •  PARoxetine (PAXIL) tablet 20 mg, 20 mg, Oral, Daily, Jack Flores MD  •  polyethylene glycol (MIRALAX) packet 17 g, 17 g, Oral, Daily, Perla Ambrose PA-C, 17 g at 08/19/21 1756  •  senna (SENOKOT) tablet 2 tablet, 2 tablet, Oral, Nightly, Perla Ambrose PA-C  •  sodium chloride 0.9 % flush 10 mL, 10 mL, Intravenous, Q12H, Perla Ambrose PA-C, 10 mL at 08/20/21 0923  •  sodium chloride 0.9 % flush 10 mL, 10 mL, Intravenous, PRN, Perla Ambrose PA-C  •  sodium chloride 0.9 % infusion, 75 mL/hr, Intravenous, Continuous, Jack Flores MD, Last Rate: 75 mL/hr at 08/20/21 1239, 75 mL/hr at 08/20/21 1239    Allergies:   Allergies   Allergen Reactions   • Codeine Nausea Only and Dizziness       Subjective    Review of Systems   Constitutional: Positive for activity change, appetite change, fatigue and unexpected weight change.   HENT: Positive for trouble swallowing.    Eyes: Positive for visual disturbance.   Respiratory: Positive for choking. Negative for shortness of breath.    Cardiovascular: Positive for leg swelling. Negative for chest pain.   Gastrointestinal: Negative for abdominal pain.   Genitourinary: Negative for difficulty urinating and vaginal pain.   Musculoskeletal: Positive for arthralgias and joint swelling.   Neurological: Positive for dizziness, speech difficulty and weakness.   Psychiatric/Behavioral: Positive for confusion.        Objective     Physical Exam:  Vital Signs:   Vitals:    08/20/21 0129 08/20/21 0412 08/20/21 0839 08/20/21 1217   BP: 115/68 102/58 117/67 110/78   BP Location:   Left arm Left arm   Patient Position:   Lying Sitting   Pulse: 96 91 90 99    Resp: 16 18 16 16   Temp: 100.1 °F (37.8 °C) 98.5 °F (36.9 °C) 98.5 °F (36.9 °C) 98 °F (36.7 °C)   TempSrc: Axillary Axillary Axillary Oral   SpO2: 90% 92% 91% 96%     BMI: There is no height or weight on file to calculate BMI.   Weight:   Wt Readings from Last 3 Encounters:   08/13/21 91.6 kg (202 lb)   08/12/21 91.6 kg (202 lb)   08/05/21 91.9 kg (202 lb 9.6 oz)       ECOG PS: 4              Physical Exam  Vitals and nursing note reviewed.   Constitutional:       General: She is not in acute distress.     Appearance: She is ill-appearing. She is not toxic-appearing.   HENT:      Head: Normocephalic and atraumatic.      Right Ear: External ear normal.      Left Ear: External ear normal.   Eyes:      General: No scleral icterus.        Right eye: No discharge.         Left eye: No discharge.   Cardiovascular:      Rate and Rhythm: Normal rate and regular rhythm.      Heart sounds: No murmur heard.     Pulmonary:      Effort: Pulmonary effort is normal. No respiratory distress.      Breath sounds: Normal breath sounds.   Chest:      Chest wall: Mass (Left chest wall mass approximately 50% larger than previous. New fullness noted over sternum.) present.   Musculoskeletal:      Right lower leg: Edema present.      Left lower leg: Edema present.   Lymphadenopathy:      Cervical: Cervical adenopathy present.   Skin:     Coloration: Skin is pale.      Comments: Waxy pale complexion   Neurological:      Mental Status: Mental status is at baseline.         Results:   Lab Results   Component Value Date    WBC 3.72 08/20/2021    HGB 7.3 (L) 08/20/2021    HCT 22.2 (L) 08/20/2021    MCV 84.7 08/20/2021     (L) 08/20/2021       Lab Results   Component Value Date    GLUCOSE 246 (H) 08/20/2021    BUN 10 08/20/2021    CREATININE 0.37 (L) 08/20/2021    EGFRIFNONA >150 08/20/2021    BCR 27.0 (H) 08/20/2021    K 4.3 08/20/2021    CO2 25.0 08/20/2021    CALCIUM 8.7 08/20/2021    ALBUMIN 3.10 (L) 08/19/2021    AST 86 (H)  08/19/2021    ALT 22 08/19/2021        Imaging:  CT Head With & Without Contrast    Result Date: 8/19/2021  HISTORY: Mental status change, metastatic cancer TECHNIQUE: Axial head CT with and without IV contrast. Radiation dose reduction techniques included automated exposure control or exposure modulation based on body size. Count of known CT and cardiac nuc med studies performed in previous 12 months: 0. COMPARISON: None. FINDINGS: Prominently dural based lesion is seen abutting the left posterior cerebellar hemisphere measuring approximately 2 x 1.1 cm that demonstrates enhancement. There also appears to be some potential dural thickening at the posterior apex abutting the posterior left frontal lobe, although this is somewhat equivocal. There is no definite large intraparenchymal lesion although subtle smaller lesions cannot be entirely excluded. There is no definite osseous abnormality. The sphenoid sinuses are opacified.     Overall, the findings are concerning for potential dural based metastases. The largest abuts the posterior left cerebellum and there is an equivocal second lesion at the left posterior vertex. In addition, small intraparenchymal metastases are not excluded. Consider follow-up MRI with contrast. Signer Name: Martina Malin MD  Signed: 8/19/2021 9:21 PM  Workstation Name: [id number]  [facility]    CT Abdomen Pelvis With Contrast    Result Date: 8/19/2021  CT Abdomen Pelvis W, CTA Chest INDICATION: Mental status change, endometrial carcinoma, assess for progression TECHNIQUE: CTA of the chest. CT of the abdomen and pelvis without IV contrast. Coronal and sagittal reconstructions were obtained.  Radiation dose reduction techniques included automated exposure control or exposure modulation based on body size. Count of known CT and cardiac nuc med studies performed in previous 12 months: 0. COMPARISON: CT of the abdomen from 7/2/21 FINDINGS: CTA chest: There is no definite  evidence of pulmonary embolism although there is somewhat limitation of evaluation for small subsegmental embolus. The patient's left supraclavicular lymphadenopathy and left low cervical lymphadenopathy has significantly increased in size and there is shift of the trachea to the right. Multilevel mediastinal nodes have significantly increased in size and there is significant narrowing of the eleonora and mainstem bronchi bilaterally. The esophageal lumen is also compressed and may be significantly involved. There are new small bilateral pleural effusions, greater on the right as well as multiple new lung metastases noted bilaterally. Bone marrow attenuation is somewhat mottled and underlying metastatic disease cannot be entirely excluded although no definite focal lesion is identified. Abdomen: Multiple new metastatic lesions are noted throughout the liver. Lesions are noted within the bilateral adrenal glands, and the spleen is not well evaluated due to beam hardening artifact. However, there is suspicion for multiple splenic lesions as well. Multiple large jeana masses are seen scattered throughout the abdomen. There is also flattening of the inferior vena cava secondary to jeana mass. Pelvis: Malignant lesion abuts the wall of the rectum anteriorly. There is a small amount of fluid in the pelvis. Multiple abnormal aortocaval and periaortic nodes are noted. No acute bowel abnormality is appreciated. No acute osseous abnormality.     1. There has been gross interval progression of metastatic disease as described above throughout the chest, abdomen and pelvis. 2. There is no definite evidence of pulmonary embolism although there is somewhat limitation of evaluation for small subsegmental embolus. Signer Name: Martina Malin MD  Signed: 8/19/2021 9:31 PM  Workstation Name: [id number]  [facility] Video Swallow With Speech Single Contrast    Result Date: 8/20/2021  EXAMINATION: FL VIDEO  SWALLOW W SPEECH SINGLE-CONTRAST-, FL LIMITED UGI FOR MBS REFLUX SINGLE-CONTRAST-  INDICATION: dysphagia, known esophageal displacement per imaging  TECHNIQUE: 1 minute and 18 seconds of fluoroscopic time was used. 8 associated fluoroscopic loops were saved. The patient was evaluated in the seated lateral position while taking a variety of consistencies of barium by mouth under the direction of speech pathology.  COMPARISON: NONE  FINDINGS: 1 minute and 18 seconds of fluoroscopy provided for a modified barium swallow, and limited upper GI series. Please see speech therapy report for full details and recommendations. There is a moderate sized filling defect seen in the area of the oral pharynx. This filling defect persists throughout multiple images, but does not appear to impede swallowing of barium, however further evaluation may be considered if clinically relevant. No focal esophageal strictures were seen. Gastroesophageal reflux was not demonstrated during this exam.       Fluoroscopy provided for a modified barium swallow, and limited upper GI series. Please see speech therapy report for full details and recommendations. Moderate sized filling defect seen in the area of the oropharynx, which does not appear to significantly impede swallowing of barium. Further evaluation may be considered if clinically relevant.        FL Limited Ugi For Mbs Reflux Single-Contrast    Result Date: 8/20/2021  EXAMINATION: FL VIDEO SWALLOW W SPEECH SINGLE-CONTRAST-, FL LIMITED UGI FOR MBS REFLUX SINGLE-CONTRAST-  INDICATION: dysphagia, known esophageal displacement per imaging  TECHNIQUE: 1 minute and 18 seconds of fluoroscopic time was used. 8 associated fluoroscopic loops were saved. The patient was evaluated in the seated lateral position while taking a variety of consistencies of barium by mouth under the direction of speech pathology.  COMPARISON: NONE  FINDINGS: 1 minute and 18 seconds of fluoroscopy provided for a modified  barium swallow, and limited upper GI series. Please see speech therapy report for full details and recommendations. There is a moderate sized filling defect seen in the area of the oral pharynx. This filling defect persists throughout multiple images, but does not appear to impede swallowing of barium, however further evaluation may be considered if clinically relevant. No focal esophageal strictures were seen. Gastroesophageal reflux was not demonstrated during this exam.       Fluoroscopy provided for a modified barium swallow, and limited upper GI series. Please see speech therapy report for full details and recommendations. Moderate sized filling defect seen in the area of the oropharynx, which does not appear to significantly impede swallowing of barium. Further evaluation may be considered if clinically relevant.        CT Angiogram Chest    Result Date: 8/19/2021  CT Abdomen Pelvis W, CTA Chest INDICATION: Mental status change, endometrial carcinoma, assess for progression TECHNIQUE: CTA of the chest. CT of the abdomen and pelvis without IV contrast. Coronal and sagittal reconstructions were obtained.  Radiation dose reduction techniques included automated exposure control or exposure modulation based on body size. Count of known CT and cardiac nuc med studies performed in previous 12 months: 0. COMPARISON: CT of the abdomen from 7/2/21 FINDINGS: CTA chest: There is no definite evidence of pulmonary embolism although there is somewhat limitation of evaluation for small subsegmental embolus. The patient's left supraclavicular lymphadenopathy and left low cervical lymphadenopathy has significantly increased in size and there is shift of the trachea to the right. Multilevel mediastinal nodes have significantly increased in size and there is significant narrowing of the eleonora and mainstem bronchi bilaterally. The esophageal lumen is also compressed and may be significantly involved. There are new small bilateral  pleural effusions, greater on the right as well as multiple new lung metastases noted bilaterally. Bone marrow attenuation is somewhat mottled and underlying metastatic disease cannot be entirely excluded although no definite focal lesion is identified. Abdomen: Multiple new metastatic lesions are noted throughout the liver. Lesions are noted within the bilateral adrenal glands, and the spleen is not well evaluated due to beam hardening artifact. However, there is suspicion for multiple splenic lesions as well. Multiple large jeana masses are seen scattered throughout the abdomen. There is also flattening of the inferior vena cava secondary to jeana mass. Pelvis: Malignant lesion abuts the wall of the rectum anteriorly. There is a small amount of fluid in the pelvis. Multiple abnormal aortocaval and periaortic nodes are noted. No acute bowel abnormality is appreciated. No acute osseous abnormality.     1. There has been gross interval progression of metastatic disease as described above throughout the chest, abdomen and pelvis. 2. There is no definite evidence of pulmonary embolism although there is somewhat limitation of evaluation for small subsegmental embolus. Signer Name: Martina Malin MD  Signed: 8/19/2021 9:31 PM  Workstation Name: [id number]  [facility]      Assessment / Plan    This is a 51 y.o. woman with rapidly progressive dedifferentiated uterine cancer. Imaging findings reviewed and discussed with the patient and her  demonstrates rapidly progressing cancer including new lesions and a brain metastasis. Today, we had a alison discussion regarding the aggressiveness of this cancer. We do not have any cancer-directly therapies that would allow us to treat this cancer in a meaningful way given the rapid progression and the patient's rapid decline. I did review that even with immunotherapy, which can be better tolerated than cytotoxic chemotherapy, we are unlikely to make an  impact on the cancer's spread as it takes several weeks for this treatment to work.  At this time a best recommendation is to proceed with home hospice as patient reports it is very important for her to be at home with her .  We discussed that home hospice can focus on quality of life rather than quantity of life with a goal of helping her have as many good days as possible.  This was reviewed with the patient's  as well via telephone.  They are both amenable to a referral for home hospice at this time.  Shannan is very adamant that she would like to go home as soon as she is able to.  I stated that hopefully will be able to let her go home once home hospice services selected and any equipment or medications are available for home use.  I expressed how sorry I was that we had to have this conversation and that I did not have any cancer related treatment to offer.    The plan for home hospice was discussed with Dr. Holbrook. I will be available as needed for Shannan and Humberto.       MIL Jensen MD  Gynecologic Oncology   Date: 08/20/21  Time: 13:59 EDT    Please note that portions of this note may have been completed with a voice recognition program. Efforts were made to edit the dictations, but occasionally words are mistranscribed.       Electronically signed by Jolie Jensen MD at 08/20/21 1417     Lowell Kwok DO at 08/20/21 1255          Maricel Quinteros MD  Consulting physician: Halie    No chief complaint on file.      Reason for consult: [facility]    HPI: Patient is a 51-year-old female with a history of endometrial cancer. Patient brought in hospital due to overall decline and decreasing appetite. Patient has been having decreasing appetite for the last several days. Since coming hospital she has been found to have widely metastatic disease which is apparently progressing.    Pain assesment: Denies    Dyspnea: Denies    N/V: Denies    PPS: 40      Past Medical History:   Diagnosis Date    • Adenocarcinoma (CMS/HCC)     endometrial   • Anxiety    • Depression    • Diabetes (CMS/HCC)    • GERD (gastroesophageal reflux disease)    • Heart murmur    • Hyperlipidemia    • Hypertension    • Thyroid disease    • Uterine cancer (CMS/HCC)      Past Surgical History:   Procedure Laterality Date   • ENDOSCOPY     • GALLBLADDER SURGERY     • TOTAL LAPAROSCOPIC HYSTERECTOMY SALPINGO OOPHORECTOMY N/A 6/18/2021    Procedure: TOTAL LAPAROSCOPIC RADICAL TUMOR DEBULKING WITH TOTAL HYSTERECTOMY (UTERUS > 250 g), BILATERAL SALPINGO-OOPHORECTOMY, LEFT URETROLYSIS, PERTIONEAL STRIPPING, LYSIS OF ADHESIONS (>90 MINUTES), CYSTOSCOPY WITH LEFT TEMPORARY STENT PLACEMENT, AND INJECTION FOR SENTINEL LYMPH NODE DISSECTION;  Surgeon: Jolie Jensen MD;  Location: [facility];  Service: Robotics - [employer];  Laterality: N/A   • WISDOM TOOTH EXTRACTION       Current Code Status     Date Active Code Status Order ID Comments User Context       8/20/2021 0747 No CPR [id number]  Trang Holbrook MD Inpatient     Advance Care Planning Activity      Questions for Current Code Status     Question Answer Comment    Code Status No CPR     Medical Interventions (Level of Support Prior to Arrest) Limited     Limited Support to NOT Include Intubation      Cardioversion/Defibrillation         Current Facility-Administered Medications   Medication Dose Route Frequency Provider Last Rate Last Admin   • busPIRone (BUSPAR) tablet 5 mg  5 mg Oral TID Jack Flores MD       • castor oil-balsam peru (VENELEX) ointment   Topical Q12H Trang Holbrook MD   Given at 08/20/21 1226   • cefTRIAXone (ROCEPHIN) 1 g/100 mL 0.9% NS (MBP)  1 g Intravenous Q24H Varsha Sal APRN   1 g at 08/19/21 2342   • dexamethasone (DECADRON) injection 4 mg  4 mg Intravenous Q6H Jack Flores MD   4 mg at 08/20/21 1226   • dextrose (D50W) 25 g/ 50mL Intravenous Solution 25 g  25 g Intravenous Q15 Min PRN Jack Flores MD       • dextrose (GLUTOSE)  oral gel 15 g  15 g Oral Q15 Min PRN Jack Flores MD       • docusate sodium (COLACE) capsule 200 mg  200 mg Oral Daily Perla Ambrose PA-C   200 mg at 08/19/21 1755   • gabapentin (NEURONTIN) capsule 300 mg  300 mg Oral TID Jack Flores MD       • glucagon (human recombinant) (GLUCAGEN DIAGNOSTIC) injection 1 mg  1 mg Subcutaneous Q15 Min PRN Jack Flores MD       • insulin lispro (humaLOG) injection 0-14 Units  0-14 Units Subcutaneous Q3H Jack Flores MD   8 Units at 08/20/21 1239   • levETIRAcetam in NaCl 0.75% (KEPPRA) IVPB 1,000 mg  1,000 mg Intravenous Q12H Jack Flores  mL/hr at 08/20/21 0538 1,000 mg at 08/20/21 0538   • levothyroxine (SYNTHROID, LEVOTHROID) tablet 250 mcg  250 mcg Oral Once per day on Mon Tue Wed Thu Fri Sat Perla Ambrose PA-C       • magnesium sulfate 4 gram infusion - Mg less than or equal to 1mg/dL  4 g Intravenous PRN Jack Flores MD        Or   • magnesium sulfate 3 gram infusion (1gm x 3) - Mg 1.1 - 1.5 mg/dL  1 g Intravenous PRN aJck Flores MD        Or   • Magnesium Sulfate 2 gram infusion- Mg 1.6 - 1.9 mg/dL  2 g Intravenous PRN Jack Flores MD       • megestrol (MEGACE) tablet 80 mg  80 mg Oral BID Perla Ambrose PA-C       • Morphine (MS CONTIN) 12 hr tablet 30 mg  30 mg Oral BID Perla Ambrose PA-C       • nystatin (MYCOSTATIN) [id number] UNIT/ML suspension 500,000 Units  5 mL Swish & Swallow 4x Daily Perla Ambrose PA-C   500,000 Units at 08/20/21 1226   • ondansetron (ZOFRAN) tablet 4 mg  4 mg Oral Q6H PRN Perla Ambrose PA-C        Or   • ondansetron (ZOFRAN) injection 4 mg  4 mg Intravenous Q6H PRN Perla Ambrose PA-C   4 mg at 08/20/21 1226   • oxyCODONE (ROXICODONE) immediate release tablet 10 mg  10 mg Oral Q6H PRN Perla Ambrose PA-C   10 mg at 08/19/21 175   • pantoprazole (PROTONIX) EC tablet 40 mg  40 mg Oral BID AC Perla Ambrose  BRIE VELASCO   40 mg at 08/19/21 1755   • PARoxetine (PAXIL) tablet 20 mg  20 mg Oral Daily Jack Flores MD       • polyethylene glycol (MIRALAX) packet 17 g  17 g Oral Daily Perla Ambrose PA-C   17 g at 08/19/21 1756   • senna (SENOKOT) tablet 2 tablet  2 tablet Oral Nightly Perla Ambrose PA-C       • sodium chloride 0.9 % flush 10 mL  10 mL Intravenous Q12H Perla Ambrose PA-C   10 mL at 08/20/21 0923   • sodium chloride 0.9 % flush 10 mL  10 mL Intravenous PRN Perla Ambrose PA-C       • sodium chloride 0.9 % infusion  75 mL/hr Intravenous Continuous Jack Flores MD 75 mL/hr at 08/20/21 1239 75 mL/hr at 08/20/21 1239     sodium chloride, 75 mL/hr, Last Rate: 75 mL/hr (08/20/21 1239)      dextrose  •  dextrose  •  glucagon (human recombinant)  •  magnesium sulfate **OR** magnesium sulfate in D5W 1g/100mL (PREMIX) **OR** magnesium sulfate  •  ondansetron **OR** ondansetron  •  oxyCODONE  •  sodium chloride  Allergies   Allergen Reactions   • Codeine Nausea Only and Dizziness     Family History   Problem Relation Age of Onset   • Diabetes Mother    • Heart attack Mother    • Heart disease Mother    • Stroke Mother    • Arthritis Father    • Cancer Father         Bladder   • Thyroid disease Father    • Diabetes Brother    • Thyroid disease Brother      Social History     Socioeconomic History   • Marital status:      Spouse name: Not on file   • Number of children: Not on file   • Years of education: Not on file   • Highest education level: Not on file   Tobacco Use   • Smoking status: Never Smoker   • Smokeless tobacco: Never Used   Vaping Use   • Vaping Use: Never used   Substance and Sexual Activity   • Alcohol use: Never   • Drug use: Never   • Sexual activity: Defer     Review of Systems -all others reviewed and found negative that mentioned in the HPI      /78 (BP Location: Left arm, Patient Position: Sitting)   Pulse 99   Temp 98 °F (36.7 °C) (Oral)    Resp 16   LMP 06/03/2021 Comment: mammogram is not up to date  SpO2 96%     Intake/Output Summary (Last 24 hours) at 8/20/2021 1255  Last data filed at 8/20/2021 0622  Gross per 24 hour   Intake 564 ml   Output 930 ml   Net -366 ml     Physical Exam:      General Appearance:    Alert, cooperative, in no acute distress   Head:    Normocephalic, without obvious abnormality, atraumatic   Eyes:            Lids and lashes normal, conjunctivae and sclerae normal, no   icterus, no pallor, corneas clear, PERRLA   Ears:    Ears appear intact with no abnormalities noted   Throat:   No oral lesions, no thrush, oral mucosa moist   Neck:   No adenopathy, supple, trachea midline, no thyromegaly, no     carotid bruit, no JVD   Back:     No kyphosis present, no scoliosis present, no skin lesions,       erythema or scars, no tenderness to percussion or                   palpation,   range of motion normal   Lungs:     Clear to auscultation,respirations regular, even and                   unlabored    Heart:    Regular rhythm and normal rate, normal S1 and S2, no            murmur, no gallop, no rub, no click   Breast Exam:    Deferred   Abdomen:     Normal bowel sounds, no masses, no organomegaly, soft        non-tender, non-distended, no guarding, no rebound                 tenderness   Genitalia:    Deferred   Extremities:   Moves all extremities well, no edema, no cyanosis, no              redness   Pulses:   Pulses palpable and equal bilaterally   Skin:   No bleeding, bruising or rash   Lymph nodes:   No palpable adenopathy   Neurologic:   Cranial nerves 2 - 12 grossly intact, sensation intact, DTR        present and equal bilaterally       Results from last 7 days   Lab Units 08/20/21  0439   WBC 10*3/mm3 3.72   HEMOGLOBIN g/dL 7.3*   HEMATOCRIT % 22.2*   PLATELETS 10*3/mm3 100*     Results from last 7 days   Lab Units 08/20/21  0824 08/19/21  2202 08/19/21  1749   SODIUM mmol/L 125*   < > 125*   POTASSIUM mmol/L 4.0   < >  4.4   CHLORIDE mmol/L 86*   < > 84*   CO2 mmol/L 26.0   < > 23.0   BUN mg/dL 9   < > 13   CREATININE mg/dL 0.36*   < > 0.49*   CALCIUM mg/dL 8.6   < > 9.4   BILIRUBIN mg/dL  --   --  1.4*   ALK PHOS U/L  --   --  330*   ALT (SGPT) U/L  --   --  22   AST (SGOT) U/L  --   --  86*   GLUCOSE mg/dL 223*   < > 362*    < > = values in this interval not displayed.     Results from last 7 days   Lab Units 08/20/21  0824   SODIUM mmol/L 125*   POTASSIUM mmol/L 4.0   CHLORIDE mmol/L 86*   CO2 mmol/L 26.0   BUN mg/dL 9   CREATININE mg/dL 0.36*   GLUCOSE mg/dL 223*   CALCIUM mg/dL 8.6     Imaging Results (Last 72 Hours)     Procedure Component Value Units Date/Time    FL Limited Ugi For Mbs Reflux Single-Contrast [[id number]] Resulted: 08/20/21 1116     Updated: 08/20/21 1136    FL Video Swallow With Speech Single Contrast [[id number]] Resulted: 08/20/21 1229     Updated: 08/20/21 1134    CT Angiogram Chest [[id number]] Collected: 08/19/21 2131     Updated: 08/19/21 2133    Narrative:      CT Abdomen Pelvis W, CTA Chest    INDICATION:   Mental status change, endometrial carcinoma, assess for progression    TECHNIQUE:   CTA of the chest. CT of the abdomen and pelvis without IV contrast. Coronal and sagittal reconstructions were obtained.  Radiation dose reduction techniques included automated exposure control or exposure modulation based on body size. Count of known CT  and cardiac nuc med studies performed in previous 12 months: 0.     COMPARISON:   CT of the abdomen from 7/2/21    FINDINGS:  CTA chest: There is no definite evidence of pulmonary embolism although there is somewhat limitation of evaluation for small subsegmental embolus. The patient's left supraclavicular lymphadenopathy and left low cervical lymphadenopathy has significantly  increased in size and there is shift of the trachea to the right. Multilevel mediastinal nodes have significantly increased in size and there is significant narrowing of the eleonora and  mainstem bronchi bilaterally. The esophageal lumen is also compressed  and may be significantly involved. There are new small bilateral pleural effusions, greater on the right as well as multiple new lung metastases noted bilaterally. Bone marrow attenuation is somewhat mottled and underlying metastatic disease cannot be  entirely excluded although no definite focal lesion is identified.    Abdomen: Multiple new metastatic lesions are noted throughout the liver. Lesions are noted within the bilateral adrenal glands, and the spleen is not well evaluated due to beam hardening artifact. However, there is suspicion for multiple splenic lesions  as well. Multiple large jeana masses are seen scattered throughout the abdomen. There is also flattening of the inferior vena cava secondary to jeana mass.    Pelvis: Malignant lesion abuts the wall of the rectum anteriorly. There is a small amount of fluid in the pelvis. Multiple abnormal aortocaval and periaortic nodes are noted. No acute bowel abnormality is appreciated. No acute osseous abnormality.      Impression:      1. There has been gross interval progression of metastatic disease as described above throughout the chest, abdomen and pelvis.  2. There is no definite evidence of pulmonary embolism although there is somewhat limitation of evaluation for small subsegmental embolus.          Signer Name: Martina Malin MD   Signed: 8/19/2021 9:31 PM   Workstation Name: [id number]    [facility]    CT Abdomen Pelvis With Contrast [[id number]] Collected: 08/19/21 2131     Updated: 08/19/21 2133    Narrative:      CT Abdomen Pelvis W, CTA Chest    INDICATION:   Mental status change, endometrial carcinoma, assess for progression    TECHNIQUE:   CTA of the chest. CT of the abdomen and pelvis without IV contrast. Coronal and sagittal reconstructions were obtained.  Radiation dose reduction techniques included automated exposure control or exposure modulation  based on body size. Count of known CT  and cardiac nuc med studies performed in previous 12 months: 0.     COMPARISON:   CT of the abdomen from 7/2/21    FINDINGS:  CTA chest: There is no definite evidence of pulmonary embolism although there is somewhat limitation of evaluation for small subsegmental embolus. The patient's left supraclavicular lymphadenopathy and left low cervical lymphadenopathy has significantly  increased in size and there is shift of the trachea to the right. Multilevel mediastinal nodes have significantly increased in size and there is significant narrowing of the eleonora and mainstem bronchi bilaterally. The esophageal lumen is also compressed  and may be significantly involved. There are new small bilateral pleural effusions, greater on the right as well as multiple new lung metastases noted bilaterally. Bone marrow attenuation is somewhat mottled and underlying metastatic disease cannot be  entirely excluded although no definite focal lesion is identified.    Abdomen: Multiple new metastatic lesions are noted throughout the liver. Lesions are noted within the bilateral adrenal glands, and the spleen is not well evaluated due to beam hardening artifact. However, there is suspicion for multiple splenic lesions  as well. Multiple large jeana masses are seen scattered throughout the abdomen. There is also flattening of the inferior vena cava secondary to jeana mass.    Pelvis: Malignant lesion abuts the wall of the rectum anteriorly. There is a small amount of fluid in the pelvis. Multiple abnormal aortocaval and periaortic nodes are noted. No acute bowel abnormality is appreciated. No acute osseous abnormality.      Impression:      1. There has been gross interval progression of metastatic disease as described above throughout the chest, abdomen and pelvis.  2. There is no definite evidence of pulmonary embolism although there is somewhat limitation of evaluation for small subsegmental  embolus.          Signer Name: Martina Malin MD   Signed: 8/19/2021 9:31 PM   Workstation Name: [id number]    [facility]    CT Head With & Without Contrast [[id number]] Collected: 08/19/21 2121     Updated: 08/19/21 2123    Narrative:      HISTORY:   Mental status change, metastatic cancer    TECHNIQUE:   Axial head CT with and without IV contrast. Radiation dose reduction techniques included automated exposure control or exposure modulation based on body size. Count of known CT and cardiac nuc med studies performed in previous 12 months: 0.     COMPARISON:   None.    FINDINGS:   Prominently dural based lesion is seen abutting the left posterior cerebellar hemisphere measuring approximately 2 x 1.1 cm that demonstrates enhancement. There also appears to be some potential dural thickening at the posterior apex abutting the  posterior left frontal lobe, although this is somewhat equivocal. There is no definite large intraparenchymal lesion although subtle smaller lesions cannot be entirely excluded. There is no definite osseous abnormality. The sphenoid sinuses are  opacified.      Impression:      Overall, the findings are concerning for potential dural based metastases. The largest abuts the posterior left cerebellum and there is an equivocal second lesion at the left posterior vertex. In addition, small intraparenchymal metastases are not  excluded. Consider follow-up MRI with contrast.    Signer Name: Martina Malin MD   Signed: 8/19/2021 9:21 PM   Workstation Name: [id number]    [facility]        Impression: Met cancer  Dysphagia  Neoplastic pain  GOC    Plan: Patient's CODE STATUS noted to be DNR which I agree with. I did not have any further goals of care discussions, as the patient states that she is waiting to talk to oncology. We will follow-up after she has been evaluated by oncology and have further discussions as needed. Continue patient on current symptomatic  regimen as she seems to be very comfortable.        Lowell Kwok DO  08/20/21  12:55 EDT              Electronically signed by Lowell Kwok DO at 08/20/21 5603

## 2021-08-20 NOTE — TELEPHONE ENCOUNTER
LITO FROM [facility] CALLED - WANTING [facility] F/U    PT WASN'T SEEN BY NEURO AND HAS NO NEURO IN CHART-    SHE WAS IN THE HOSPITAL DUE TO ENDOMETRIAL CANCER AND NEEDS NEURO F/U FOR NEW BRAIN LESIONS.    PER RAIZA AT [facility]- THIS SOUNDS MORE NEUROSURG-     I ADV HER THIS, SHE STATES WILL ASK REFERRING DOCTOR WHAT THEY WANT TO DO AND CALL BACK.

## 2021-08-20 NOTE — NURSING NOTE
WOC consult:     Coccyx wound POA     Patient presents with a stage 3 pressure injury to her coccyx.   Wound measures 2.0x3.0x0.4cm.   Wound bed white/pink, and non-blanching.   Periwound is intact, pink, and blanching.     -Venelex ointment BID    Heels intact and blanching.   Khris of 11.     I will order a [employer] specialty mattress for pressure management.     See order for care needs.   WOC will continue to follow.   Contact if needs arise.     Thanks

## 2021-08-20 NOTE — CONSULTS
Gynecologic Oncology Admission H&P     Patient Name: Shannan Jha  : 1970   MRN: [id number]     Reason for Consultation: metastatic uterine cancer    History of Present Illness: Shannan Jha is a 51 y.o. female who presented to my office on 2021 for failure to thrive, weakness, and pain. She was found to have a persistently low hemoglobin of 6.4 despite multiple transfusions. She also reported a weeklong history of falls, dizziness, changes in her vision and balance issues.  Her speech was also somewhat garbled occasionally.  She also had not eaten in the past 2 days mostly due to difficulty swallowing as well as decreased appetite.  Given his consolation of findings I recommended admission to which the patient was amenable for.  She was admitted last night and underwent CT scans that demonstrate rapidly progressive disease as well as concern for brain lesions.    Oncologic History:  Oncology/Hematology History   Endometrial cancer (CMS/HCC)   6/3/2021 Initial Diagnosis    Referred by Dr. Nancy Pearce    2021: TVUS with uterus measuring 3.4 x 6.8 x 6.4 cm with an endometrial stripe thickness of 3.2 mm.  Left and right ovaries normal.  2021: Biopsy of cervical polyp demonstrates carcinoma most consistent with endometrial adenocarcinoma     2021 Surgery    Robotic assisted radical tumor debulking with total laparoscopic hysterectomy for uterus weighing greater than 250 g, bilateral salpingo-oophorectomy, left ureterolysis, peritoneal stripping, lysis of adhesions, cystoscopy with left temporary stent placement, and biopsy of clitoral mass.    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  Right fallopian tube and ovary negative.  Extensive LVSI noted.  Clitoral munoz mass with dedifferentiated carcinoma myometrium.    Surgery at [facility] by Jolie  Jose Guadalupe Jensen MD       7/2/2021 Imaging    Stat CT scans ordered for enlarging chest wall mass noted at problem visit.    CT C/A/P with diffuse metastatic disease.  Parenchymal liver lesions suspicious for metastases noted.  Adenopathy noted in the left supraclavicular, left retropectoral, mediastinum, janice hepatis, retroperitoneum and pelvis.  Pulmonary nodules concerning for metastasis.  Lobulated soft tissue mass in the left upper pelvis most likely lymph node.  Soft tissue mass at peritoneum measuring 4.5 x 4.1 cm corresponding to the palpable clitoral mass.     7/2/2021 Cancer Staged    Staging form: Corpus Uteri - Carcinoma And Carcinosarcoma, AJCC 8th Edition  - Clinical stage from 7/2/2021: FIGO Stage IVB (cN2a, pM1) - Signed by Jolie Jensen MD on 7/6/2021     7/15/2021 -  Chemotherapy    OP UTERINE PACLitaxel / CARBOplatin (Q21D)          Past Medical History:   Past Medical History:   Diagnosis Date   • Adenocarcinoma (CMS/HCC)     endometrial   • Anxiety    • Depression    • Diabetes (CMS/HCC)    • GERD (gastroesophageal reflux disease)    • Heart murmur    • Hyperlipidemia    • Hypertension    • Thyroid disease    • Uterine cancer (CMS/HCC)        Past Surgical History:   Past Surgical History:   Procedure Laterality Date   • ENDOSCOPY     • GALLBLADDER SURGERY     • TOTAL LAPAROSCOPIC HYSTERECTOMY SALPINGO OOPHORECTOMY N/A 6/18/2021    Procedure: TOTAL LAPAROSCOPIC RADICAL TUMOR DEBULKING WITH TOTAL HYSTERECTOMY (UTERUS > 250 g), BILATERAL SALPINGO-OOPHORECTOMY, LEFT URETROLYSIS, PERTIONEAL STRIPPING, LYSIS OF ADHESIONS (>90 MINUTES), CYSTOSCOPY WITH LEFT TEMPORARY STENT PLACEMENT, AND INJECTION FOR SENTINEL LYMPH NODE DISSECTION;  Surgeon: Jolie Jensen MD;  Location: [facility];  Service: Robotics - DaVinci;  Laterality: N/A   • WISDOM TOOTH EXTRACTION         Family History:   Family History   Problem Relation Age of Onset   • Diabetes Mother    • Heart attack Mother    • Heart disease  Mother    • Stroke Mother    • Arthritis Father    • Cancer Father         Bladder   • Thyroid disease Father    • Diabetes Brother    • Thyroid disease Brother        Social History:   Social History     Socioeconomic History   • Marital status:      Spouse name: Not on file   • Number of children: Not on file   • Years of education: Not on file   • Highest education level: Not on file   Tobacco Use   • Smoking status: Never Smoker   • Smokeless tobacco: Never Used   Vaping Use   • Vaping Use: Never used   Substance and Sexual Activity   • Alcohol use: Never   • Drug use: Never   • Sexual activity: Defer     Medications:     Current Facility-Administered Medications:   •  busPIRone (BUSPAR) tablet 5 mg, 5 mg, Oral, TID, Jack Flores MD  •  castor oil-balsam peru (VENELEX) ointment, , Topical, Q12H, Trang Holbrook MD, Given at 08/20/21 1226  •  cefTRIAXone (ROCEPHIN) 1 g/100 mL 0.9% NS (MBP), 1 g, Intravenous, Q24H, Varsha Sal APRN, 1 g at 08/19/21 2342  •  dexamethasone (DECADRON) injection 4 mg, 4 mg, Intravenous, Q6H, Jack Flores MD, 4 mg at 08/20/21 1226  •  dextrose (D50W) 25 g/ 50mL Intravenous Solution 25 g, 25 g, Intravenous, Q15 Min PRN, Jack Flores MD  •  dextrose (GLUTOSE) oral gel 15 g, 15 g, Oral, Q15 Min PRN, Jack Flores MD  •  docusate sodium (COLACE) capsule 200 mg, 200 mg, Oral, Daily, Perla Ambrose PA-C, 200 mg at 08/19/21 1755  •  gabapentin (NEURONTIN) capsule 300 mg, 300 mg, Oral, TID, Jack Flores MD  •  glucagon (human recombinant) (GLUCAGEN DIAGNOSTIC) injection 1 mg, 1 mg, Subcutaneous, Q15 Min PRN, Jack Flores MD  •  insulin lispro (humaLOG) injection 0-14 Units, 0-14 Units, Subcutaneous, Q3H, Jack Flores MD, 8 Units at 08/20/21 1239  •  levETIRAcetam in NaCl 0.75% (KEPPRA) IVPB 1,000 mg, 1,000 mg, Intravenous, Q12H, Jack Flores MD, Last Rate: 400 mL/hr at 08/20/21 0538, 1,000 mg at 08/20/21 0538  •   levothyroxine (SYNTHROID, LEVOTHROID) tablet 250 mcg, 250 mcg, Oral, Once per day on Mon Tue Wed Thu Fri Sat, Perla Ambrose PA-C  •  magnesium sulfate 4 gram infusion - Mg less than or equal to 1mg/dL, 4 g, Intravenous, PRN **OR** magnesium sulfate 3 gram infusion (1gm x 3) - Mg 1.1 - 1.5 mg/dL, 1 g, Intravenous, PRN **OR** Magnesium Sulfate 2 gram infusion- Mg 1.6 - 1.9 mg/dL, 2 g, Intravenous, PRN, Jack Flores MD  •  megestrol (MEGACE) tablet 80 mg, 80 mg, Oral, BID, Perla Ambrose PA-C  •  Morphine (MS CONTIN) 12 hr tablet 30 mg, 30 mg, Oral, BID, Perla Ambrose PA-C  •  nystatin (MYCOSTATIN) 441140 UNIT/ML suspension 500,000 Units, 5 mL, Swish & Swallow, 4x Daily, Perla Ambrose PA-C, 500,000 Units at 08/20/21 1226  •  ondansetron (ZOFRAN) tablet 4 mg, 4 mg, Oral, Q6H PRN **OR** ondansetron (ZOFRAN) injection 4 mg, 4 mg, Intravenous, Q6H PRN, Perla Ambrose PA-C, 4 mg at 08/20/21 1226  •  oxyCODONE (ROXICODONE) immediate release tablet 10 mg, 10 mg, Oral, Q6H PRN, Perla Ambrose PA-C, 10 mg at 08/19/21 1755  •  pantoprazole (PROTONIX) EC tablet 40 mg, 40 mg, Oral, BID AC, Perla Ambrose PA-C, 40 mg at 08/19/21 1755  •  PARoxetine (PAXIL) tablet 20 mg, 20 mg, Oral, Daily, Jack Flores MD  •  polyethylene glycol (MIRALAX) packet 17 g, 17 g, Oral, Daily, Perla Ambrose PA-C, 17 g at 08/19/21 1756  •  senna (SENOKOT) tablet 2 tablet, 2 tablet, Oral, Nightly, Perla Ambrose PA-C  •  sodium chloride 0.9 % flush 10 mL, 10 mL, Intravenous, Q12H, Perla Ambrose PA-C, 10 mL at 08/20/21 0923  •  sodium chloride 0.9 % flush 10 mL, 10 mL, Intravenous, PRN, Perla Ambrose PA-C  •  sodium chloride 0.9 % infusion, 75 mL/hr, Intravenous, Continuous, Jack Flores MD, Last Rate: 75 mL/hr at 08/20/21 1239, 75 mL/hr at 08/20/21 1239    Allergies:   Allergies   Allergen Reactions   • Codeine Nausea Only and Dizziness        Subjective    Review of Systems   Constitutional: Positive for activity change, appetite change, fatigue and unexpected weight change.   HENT: Positive for trouble swallowing.    Eyes: Positive for visual disturbance.   Respiratory: Positive for choking. Negative for shortness of breath.    Cardiovascular: Positive for leg swelling. Negative for chest pain.   Gastrointestinal: Negative for abdominal pain.   Genitourinary: Negative for difficulty urinating and vaginal pain.   Musculoskeletal: Positive for arthralgias and joint swelling.   Neurological: Positive for dizziness, speech difficulty and weakness.   Psychiatric/Behavioral: Positive for confusion.        Objective     Physical Exam:  Vital Signs:   Vitals:    08/20/21 0129 08/20/21 0412 08/20/21 0839 08/20/21 1217   BP: 115/68 102/58 117/67 110/78   BP Location:   Left arm Left arm   Patient Position:   Lying Sitting   Pulse: 96 91 90 99   Resp: 16 18 16 16   Temp: 100.1 °F (37.8 °C) 98.5 °F (36.9 °C) 98.5 °F (36.9 °C) 98 °F (36.7 °C)   TempSrc: Axillary Axillary Axillary Oral   SpO2: 90% 92% 91% 96%     BMI: There is no height or weight on file to calculate BMI.   Weight:   Wt Readings from Last 3 Encounters:   08/13/21 91.6 kg (202 lb)   08/12/21 91.6 kg (202 lb)   08/05/21 91.9 kg (202 lb 9.6 oz)       ECOG PS: 4              Physical Exam  Vitals and nursing note reviewed.   Constitutional:       General: She is not in acute distress.     Appearance: She is ill-appearing. She is not toxic-appearing.   HENT:      Head: Normocephalic and atraumatic.      Right Ear: External ear normal.      Left Ear: External ear normal.   Eyes:      General: No scleral icterus.        Right eye: No discharge.         Left eye: No discharge.   Cardiovascular:      Rate and Rhythm: Normal rate and regular rhythm.      Heart sounds: No murmur heard.     Pulmonary:      Effort: Pulmonary effort is normal. No respiratory distress.      Breath sounds: Normal breath  sounds.   Chest:      Chest wall: Mass (Left chest wall mass approximately 50% larger than previous. New fullness noted over sternum.) present.   Musculoskeletal:      Right lower leg: Edema present.      Left lower leg: Edema present.   Lymphadenopathy:      Cervical: Cervical adenopathy present.   Skin:     Coloration: Skin is pale.      Comments: Waxy pale complexion   Neurological:      Mental Status: Mental status is at baseline.         Results:   Lab Results   Component Value Date    WBC 3.72 08/20/2021    HGB 7.3 (L) 08/20/2021    HCT 22.2 (L) 08/20/2021    MCV 84.7 08/20/2021     (L) 08/20/2021       Lab Results   Component Value Date    GLUCOSE 246 (H) 08/20/2021    BUN 10 08/20/2021    CREATININE 0.37 (L) 08/20/2021    EGFRIFNONA >150 08/20/2021    BCR 27.0 (H) 08/20/2021    K 4.3 08/20/2021    CO2 25.0 08/20/2021    CALCIUM 8.7 08/20/2021    ALBUMIN 3.10 (L) 08/19/2021    AST 86 (H) 08/19/2021    ALT 22 08/19/2021        Imaging:  CT Head With & Without Contrast    Result Date: 8/19/2021  HISTORY: Mental status change, metastatic cancer TECHNIQUE: Axial head CT with and without IV contrast. Radiation dose reduction techniques included automated exposure control or exposure modulation based on body size. Count of known CT and cardiac nuc med studies performed in previous 12 months: 0. COMPARISON: None. FINDINGS: Prominently dural based lesion is seen abutting the left posterior cerebellar hemisphere measuring approximately 2 x 1.1 cm that demonstrates enhancement. There also appears to be some potential dural thickening at the posterior apex abutting the posterior left frontal lobe, although this is somewhat equivocal. There is no definite large intraparenchymal lesion although subtle smaller lesions cannot be entirely excluded. There is no definite osseous abnormality. The sphenoid sinuses are opacified.     Overall, the findings are concerning for potential dural based metastases. The largest  abuts the posterior left cerebellum and there is an equivocal second lesion at the left posterior vertex. In addition, small intraparenchymal metastases are not excluded. Consider follow-up MRI with contrast. Signer Name: Martina Malin MD  Signed: 8/19/2021 9:21 PM  Workstation Name: [id number]  [facility]    CT Abdomen Pelvis With Contrast    Result Date: 8/19/2021  CT Abdomen Pelvis W, CTA Chest INDICATION: Mental status change, endometrial carcinoma, assess for progression TECHNIQUE: CTA of the chest. CT of the abdomen and pelvis without IV contrast. Coronal and sagittal reconstructions were obtained.  Radiation dose reduction techniques included automated exposure control or exposure modulation based on body size. Count of known CT and cardiac nuc med studies performed in previous 12 months: 0. COMPARISON: CT of the abdomen from 7/2/21 FINDINGS: CTA chest: There is no definite evidence of pulmonary embolism although there is somewhat limitation of evaluation for small subsegmental embolus. The patient's left supraclavicular lymphadenopathy and left low cervical lymphadenopathy has significantly increased in size and there is shift of the trachea to the right. Multilevel mediastinal nodes have significantly increased in size and there is significant narrowing of the eleonora and mainstem bronchi bilaterally. The esophageal lumen is also compressed and may be significantly involved. There are new small bilateral pleural effusions, greater on the right as well as multiple new lung metastases noted bilaterally. Bone marrow attenuation is somewhat mottled and underlying metastatic disease cannot be entirely excluded although no definite focal lesion is identified. Abdomen: Multiple new metastatic lesions are noted throughout the liver. Lesions are noted within the bilateral adrenal glands, and the spleen is not well evaluated due to beam hardening artifact. However, there is suspicion for multiple  splenic lesions as well. Multiple large jeana masses are seen scattered throughout the abdomen. There is also flattening of the inferior vena cava secondary to jeana mass. Pelvis: Malignant lesion abuts the wall of the rectum anteriorly. There is a small amount of fluid in the pelvis. Multiple abnormal aortocaval and periaortic nodes are noted. No acute bowel abnormality is appreciated. No acute osseous abnormality.     1. There has been gross interval progression of metastatic disease as described above throughout the chest, abdomen and pelvis. 2. There is no definite evidence of pulmonary embolism although there is somewhat limitation of evaluation for small subsegmental embolus. Signer Name: Martina Malin MD  Signed: 8/19/2021 9:31 PM  Workstation Name: [id number]  [facility]    FL Video Swallow With Speech Single Contrast    Result Date: 8/20/2021  EXAMINATION: FL VIDEO SWALLOW W SPEECH SINGLE-CONTRAST-, FL LIMITED UGI FOR MBS REFLUX SINGLE-CONTRAST-  INDICATION: dysphagia, known esophageal displacement per imaging  TECHNIQUE: 1 minute and 18 seconds of fluoroscopic time was used. 8 associated fluoroscopic loops were saved. The patient was evaluated in the seated lateral position while taking a variety of consistencies of barium by mouth under the direction of speech pathology.  COMPARISON: NONE  FINDINGS: 1 minute and 18 seconds of fluoroscopy provided for a modified barium swallow, and limited upper GI series. Please see speech therapy report for full details and recommendations. There is a moderate sized filling defect seen in the area of the oral pharynx. This filling defect persists throughout multiple images, but does not appear to impede swallowing of barium, however further evaluation may be considered if clinically relevant. No focal esophageal strictures were seen. Gastroesophageal reflux was not demonstrated during this exam.       Fluoroscopy provided for a modified barium  swallow, and limited upper GI series. Please see speech therapy report for full details and recommendations. Moderate sized filling defect seen in the area of the oropharynx, which does not appear to significantly impede swallowing of barium. Further evaluation may be considered if clinically relevant.        FL Limited Ugi For Mbs Reflux Single-Contrast    Result Date: 8/20/2021  EXAMINATION: FL VIDEO SWALLOW W SPEECH SINGLE-CONTRAST-, FL LIMITED UGI FOR MBS REFLUX SINGLE-CONTRAST-  INDICATION: dysphagia, known esophageal displacement per imaging  TECHNIQUE: 1 minute and 18 seconds of fluoroscopic time was used. 8 associated fluoroscopic loops were saved. The patient was evaluated in the seated lateral position while taking a variety of consistencies of barium by mouth under the direction of speech pathology.  COMPARISON: NONE  FINDINGS: 1 minute and 18 seconds of fluoroscopy provided for a modified barium swallow, and limited upper GI series. Please see speech therapy report for full details and recommendations. There is a moderate sized filling defect seen in the area of the oral pharynx. This filling defect persists throughout multiple images, but does not appear to impede swallowing of barium, however further evaluation may be considered if clinically relevant. No focal esophageal strictures were seen. Gastroesophageal reflux was not demonstrated during this exam.       Fluoroscopy provided for a modified barium swallow, and limited upper GI series. Please see speech therapy report for full details and recommendations. Moderate sized filling defect seen in the area of the oropharynx, which does not appear to significantly impede swallowing of barium. Further evaluation may be considered if clinically relevant.        CT Angiogram Chest    Result Date: 8/19/2021  CT Abdomen Pelvis W, CTA Chest INDICATION: Mental status change, endometrial carcinoma, assess for progression TECHNIQUE: CTA of the chest. CT of the  abdomen and pelvis without IV contrast. Coronal and sagittal reconstructions were obtained.  Radiation dose reduction techniques included automated exposure control or exposure modulation based on body size. Count of known CT and cardiac nuc med studies performed in previous 12 months: 0. COMPARISON: CT of the abdomen from 7/2/21 FINDINGS: CTA chest: There is no definite evidence of pulmonary embolism although there is somewhat limitation of evaluation for small subsegmental embolus. The patient's left supraclavicular lymphadenopathy and left low cervical lymphadenopathy has significantly increased in size and there is shift of the trachea to the right. Multilevel mediastinal nodes have significantly increased in size and there is significant narrowing of the eleonora and mainstem bronchi bilaterally. The esophageal lumen is also compressed and may be significantly involved. There are new small bilateral pleural effusions, greater on the right as well as multiple new lung metastases noted bilaterally. Bone marrow attenuation is somewhat mottled and underlying metastatic disease cannot be entirely excluded although no definite focal lesion is identified. Abdomen: Multiple new metastatic lesions are noted throughout the liver. Lesions are noted within the bilateral adrenal glands, and the spleen is not well evaluated due to beam hardening artifact. However, there is suspicion for multiple splenic lesions as well. Multiple large jeana masses are seen scattered throughout the abdomen. There is also flattening of the inferior vena cava secondary to jeana mass. Pelvis: Malignant lesion abuts the wall of the rectum anteriorly. There is a small amount of fluid in the pelvis. Multiple abnormal aortocaval and periaortic nodes are noted. No acute bowel abnormality is appreciated. No acute osseous abnormality.     1. There has been gross interval progression of metastatic disease as described above throughout the chest, abdomen  and pelvis. 2. There is no definite evidence of pulmonary embolism although there is somewhat limitation of evaluation for small subsegmental embolus. Signer Name: Martina Malin MD  Signed: 8/19/2021 9:31 PM  Workstation Name: [id number]  [facility]      Assessment / Plan    This is a 51 y.o. woman with rapidly progressive dedifferentiated uterine cancer. Imaging findings reviewed and discussed with the patient and her  demonstrates rapidly progressing cancer including new lesions and a brain metastasis. Today, we had a alison discussion regarding the aggressiveness of this cancer. We do not have any cancer-directly therapies that would allow us to treat this cancer in a meaningful way given the rapid progression and the patient's rapid decline. I did review that even with immunotherapy, which can be better tolerated than cytotoxic chemotherapy, we are unlikely to make an impact on the cancer's spread as it takes several weeks for this treatment to work.  At this time a best recommendation is to proceed with home hospice as patient reports it is very important for her to be at home with her .  We discussed that home hospice can focus on quality of life rather than quantity of life with a goal of helping her have as many good days as possible.  This was reviewed with the patient's  as well via telephone.  They are both amenable to a referral for home hospice at this time.  Shannan is very adamant that she would like to go home as soon as she is able to.  I stated that hopefully will be able to let her go home once home hospice services selected and any equipment or medications are available for home use.  I expressed how sorry I was that we had to have this conversation and that I did not have any cancer related treatment to offer.    The plan for home hospice was discussed with Dr. Holbrook. I will be available as needed for Shannan and Humberto.       MIL Jensen MD  Gynecologic  Oncology   Date: 08/20/21  Time: 13:59 EDT    Please note that portions of this note may have been completed with a voice recognition program. Efforts were made to edit the dictations, but occasionally words are mistranscribed.

## 2021-08-20 NOTE — NURSING NOTE
VSS. RA. NSR to sinus tach on monitor. Patient has had no complaints of pain or nausea throughout the shift. Patient has been alert and oriented and able to follow commands. Repositioned q2hrs. Will continue to monitor closely.

## 2021-08-21 PROBLEM — A49.9 UTI (URINARY TRACT INFECTION), BACTERIAL: Status: ACTIVE | Noted: 2021-01-01

## 2021-08-21 PROBLEM — N39.0 UTI (URINARY TRACT INFECTION), BACTERIAL: Status: ACTIVE | Noted: 2021-01-01

## 2021-08-21 PROBLEM — E87.1 HYPONATREMIA: Status: ACTIVE | Noted: 2021-01-01

## 2021-08-21 NOTE — THERAPY DISCHARGE NOTE
Patient Name: Shannan Jha  : 1970    MRN: [id number]                              Today's Date: 2021       Admit Date: 2021    Visit Dx: No diagnosis found.  Patient Active Problem List   Diagnosis   • Endometrial cancer (CMS/HCC)   • Type 2 diabetes mellitus (CMS/HCC)   • Dehydration   • Drug induced constipation   • Cancer related pain   • Acute anemia   • Generalized weakness   • Endometrial cancer, FIGO stage IVB (CMS/HCC)   • Hyponatremia   • UTI (urinary tract infection), bacterial     Past Medical History:   Diagnosis Date   • Adenocarcinoma (CMS/HCC)     endometrial   • Anxiety    • Depression    • Diabetes (CMS/HCC)    • GERD (gastroesophageal reflux disease)    • Heart murmur    • Hyperlipidemia    • Hypertension    • Thyroid disease    • Uterine cancer (CMS/HCC)      Past Surgical History:   Procedure Laterality Date   • ENDOSCOPY     • GALLBLADDER SURGERY     • TOTAL LAPAROSCOPIC HYSTERECTOMY SALPINGO OOPHORECTOMY N/A 2021    Procedure: TOTAL LAPAROSCOPIC RADICAL TUMOR DEBULKING WITH TOTAL HYSTERECTOMY (UTERUS > 250 g), BILATERAL SALPINGO-OOPHORECTOMY, LEFT URETROLYSIS, PERTIONEAL STRIPPING, LYSIS OF ADHESIONS (>90 MINUTES), CYSTOSCOPY WITH LEFT TEMPORARY STENT PLACEMENT, AND INJECTION FOR SENTINEL LYMPH NODE DISSECTION;  Surgeon: Jolie Jensen MD;  Location: [facility];  Service: [employer] - [employer];  Laterality: N/A   • WISDOM TOOTH EXTRACTION       General Information     Row Name 21 1306          Physical Therapy Time and Intention    Document Type  discharge evaluation/summary  -KG     Mode of Treatment  physical therapy  -KG     Row Name 21 1303          General Information    Patient Profile Reviewed  yes  -KG     Prior Level of Function  min assist:;all household mobility;ADL's  -KG     Existing Precautions/Restrictions  fall  -KG     Barriers to Rehab  medically complex  -KG     Row Name 21 1304          Living Environment    Lives With  spouse  -KG      Row Name 08/21/21 1307          Home Main Entrance    Number of Stairs, Main Entrance  two  -KG     Stair Railings, Main Entrance  none  -KG     Row Name 08/21/21 1307          Stairs Within Home, Primary    Number of Stairs, Within Home, Primary  none  -KG     Row Name 08/21/21 1307          Cognition    Orientation Status (Cognition)  oriented x 3  -KG     Row Name 08/21/21 1307          Safety Issues, Functional Mobility    Safety Issues Affecting Function (Mobility)  insight into deficits/self-awareness;positioning of assistive device  -KG     Impairments Affecting Function (Mobility)  balance;strength;endurance/activity tolerance  -KG       User Key  (r) = Recorded By, (t) = Taken By, (c) = Cosigned By    Initials Name Provider Type    KG Yessy Soria Physical Therapist        Mobility     Row Name 08/21/21 1312          Bed Mobility    Bed Mobility  sit-supine  -KG     Sit-Supine [place] (Bed Mobility)  supervision  -KG     Assistive Device (Bed Mobility)  head of bed elevated  -KG     Comment (Bed Mobility)  cues for positioning  -KG     Row Name 08/21/21 1312          Transfers    Comment (Transfers)  cues for safe HP  -KG     Row Name 08/21/21 1312          Sit-Stand Transfer    Sit-Stand [place] (Transfers)  minimum assist (75% patient effort);1 person to manage equipment;1 person assist  -KG     Assistive Device (Sit-Stand Transfers)  walker, front-wheeled  -KG     Row Name 08/21/21 1312          Gait/Stairs (Locomotion)    [place] Level (Gait)  minimum assist (75% patient effort);1 person to manage equipment;1 person assist  -KG     Assistive Device (Gait)  walker, front-wheeled  -KG     Distance in Feet (Gait)  3  -KG     Deviations/Abnormal Patterns (Gait)  gait speed decreased  -KG     Bilateral Gait Deviations  forward flexed posture;heel strike decreased  -KG     Comment (Gait/Stairs)  Pt able to ambulate from WC back to bed with RW, min-A and unsteady gait.  Cues for AD  managment and stability.  -KG       User Key  (r) = Recorded By, (t) = Taken By, (c) = Cosigned By    Initials Name Provider Type    Yessy Rodriguez Physical Therapist        Obj/Interventions     Row Name 08/21/21 1331          Strength Comprehensive (MMT)    General Manual Muscle Testing (MMT) Assessment  lower extremity strength deficits identified  -KG     Comment, General Manual Muscle Testing (MMT) Assessment  4-/5 BLE  -KG       User Key  (r) = Recorded By, (t) = Taken By, (c) = Cosigned By    Initials Name Provider Type    Yessy Rodriguez Physical Therapist        Goals/Plan    No documentation.       Clinical Impression     Row Name 08/21/21 1331          Pain    Additional Documentation  Pain Scale: Numbers Pre/Post-Treatment (Group)  -KG     Row Name 08/21/21 1331          Pain Scale: Numbers Pre/Post-Treatment    Pretreatment Pain Rating  0/10 - no pain  -KG     Posttreatment Pain Rating  0/10 - no pain  -KG     Row Name 08/21/21 1331          Plan of Care Review    Plan of Care Reviewed With  patient;spouse  -KG     Progress  no change  -KG     Outcome Summary  PT eval performed: Patient presenting with recent decline in mobility and poor prognosis.  Min-A for transfers and ambulation with RW.  Only able to ambulate a few feet and uses WC for longer distances.  Patient will benefit from a FWW and bedside commode at d/c and recommend home with assist and probable hospice.  Family not wanting further therapy services and will focus on comfort care.  -KG     Row Name 08/21/21 1334          Positioning and Restraints    Pre-Treatment Position  in bed  -KG     Post Treatment Position  bed  -KG     In Bed  notified nsg;fowlers;call light within reach;encouraged to call for assist;exit alarm on  -KG       User Key  (r) = Recorded By, (t) = Taken By, (c) = Cosigned By    Initials Name Provider Type    Yessy Rodriguez Physical Therapist        Outcome Measures     Row Name 08/21/21 6390           How much help from another person do you currently need...    Turning from your back to your side while in flat bed without using bedrails?  3  -KG     Moving from lying on back to sitting on the side of a flat bed without bedrails?  3  -KG     Moving to and from a bed to a chair (including a wheelchair)?  2  -KG     Standing up from a chair using your arms (e.g., wheelchair, bedside chair)?  3  -KG     Climbing 3-5 steps with a railing?  2  -KG     To walk in hospital room?  2  -KG     AM-PAC 6 Clicks Score (PT)  15  -KG     Row Name 08/21/21 1342 08/21/21 1337       Functional Assessment    Outcome Measure Options  AM-PAC 6 Clicks Daily Activity (OT)  -ORACIO  AM-PAC 6 Clicks Basic Mobility (PT)  -KG      User Key  (r) = Recorded By, (t) = Taken By, (c) = Cosigned By    Initials Name Provider Type    KG Yessy Soria Physical Therapist    Lois Mata OT Occupational Therapist                       Physical Therapy Education                 Title: PT OT SLP Therapies (In Progress)     Topic: Physical Therapy (In Progress)     Point: Mobility training (Done)     Learning Progress Summary           Patient Acceptance, E,TB, VU by KG at 8/21/2021 1338                   Point: Home exercise program (Not Started)     Learner Progress:  Not documented in this visit.          Point: Body mechanics (Done)     Learning Progress Summary           Patient Acceptance, E,TB, VU by KG at 8/21/2021 1338                   Point: Precautions (Done)     Learning Progress Summary           Patient Acceptance, E,TB, VU by KG at 8/21/2021 1338                               User Key     Initials Effective Dates Name Provider Type Discipline    KG 06/16/21 -  Yessy Soria Physical Therapist PT              PT Recommendation and Plan     Plan of Care Reviewed With: patient, spouse  Progress: no change  Outcome Summary: PT eval performed: Patient presenting with recent decline in mobility and poor prognosis.  Min-A for transfers  and ambulation with RW.  Only able to ambulate a few feet and uses WC for longer distances.  Patient will benefit from a FWW and bedside commode at d/c and recommend home with assist and probable hospice.  Family not wanting further therapy services and will focus on comfort care.     Time Calculation:   PT Charges     Row Name 08/21/21 1341             Time Calculation    Start Time  1050  -KG      PT Received On  08/21/21  -KG         Untimed Charges    PT Eval/Re-eval Minutes  60  -KG         Total Minutes    Untimed Charges Total Minutes  60  -KG       Total Minutes  60  -KG        User Key  (r) = Recorded By, (t) = Taken By, (c) = Cosigned By    Initials Name Provider Type    KG Yessy Soria Physical Therapist        Therapy Charges for Today     Code Description Service Date Service Provider Modifiers Qty    55183727264 HC PT EVAL LOW COMPLEXITY 4 8/21/2021 Yessy Soria GP 1          PT G-Codes  Outcome Measure Options: AM-PAC 6 Clicks Daily Activity (OT)  AM-PAC 6 Clicks Score (PT): 15  AM-PAC 6 Clicks Score (OT): 16    Yessy Soria  8/21/2021

## 2021-08-21 NOTE — THERAPY DISCHARGE NOTE
Acute Care - Occupational Therapy Discharge  [facility]    Patient Name: Shannan Jha  : 1970    MRN: [id number]                              Today's Date: 2021       Admit Date: 2021    Visit Dx: No diagnosis found.  Patient Active Problem List   Diagnosis   • Endometrial cancer (CMS/HCC)   • Type 2 diabetes mellitus (CMS/HCC)   • Dehydration   • Drug induced constipation   • Cancer related pain   • Acute anemia   • Generalized weakness   • Endometrial cancer, FIGO stage IVB (CMS/HCC)   • Hyponatremia   • UTI (urinary tract infection), bacterial     Past Medical History:   Diagnosis Date   • Adenocarcinoma (CMS/HCC)     endometrial   • Anxiety    • Depression    • Diabetes (CMS/HCC)    • GERD (gastroesophageal reflux disease)    • Heart murmur    • Hyperlipidemia    • Hypertension    • Thyroid disease    • Uterine cancer (CMS/HCC)      Past Surgical History:   Procedure Laterality Date   • ENDOSCOPY     • GALLBLADDER SURGERY     • TOTAL LAPAROSCOPIC HYSTERECTOMY SALPINGO OOPHORECTOMY N/A 2021    Procedure: TOTAL LAPAROSCOPIC RADICAL TUMOR DEBULKING WITH TOTAL HYSTERECTOMY (UTERUS > 250 g), BILATERAL SALPINGO-OOPHORECTOMY, LEFT URETROLYSIS, PERTIONEAL STRIPPING, LYSIS OF ADHESIONS (>90 MINUTES), CYSTOSCOPY WITH LEFT TEMPORARY STENT PLACEMENT, AND INJECTION FOR SENTINEL LYMPH NODE DISSECTION;  Surgeon: Jolie Jensen MD;  Location: [facility];  [facility];  Laterality: N/A   • WISDOM TOOTH EXTRACTION       General Information     Row Name 21 1317          OT Time and Intention    Document Type  discharge evaluation/summary  -ORACIO     Mode of Treatment  occupational therapy  -ORACIO     Row Name 21 1310          General Information    Patient Profile Reviewed  yes  -ORACIO     Prior Level of Function  min assist:;ADL's;bed mobility;transfer;all household mobility  -ORACIO     Existing Precautions/Restrictions  fall  -ORACIO     Barriers to Rehab  medically complex  -ORACIO     Row  Name 08/21/21 1317          Occupational Profile    Environmental Supports and Barriers (Occupational Profile)  Pt has w/c borrowed from family member; plans to discharge to father-in-law's house  -ORACIO     Row Name 08/21/21 1317          Living Environment    Lives With  spouse  -ORACIO     Row Name 08/21/21 1317          Home Main Entrance    Number of Stairs, Main Entrance  two  -ORACIO     Stair Railings, Main Entrance  none  -ORACIO     Row Name 08/21/21 1317          Stairs Within Home, Primary    Number of Stairs, Within Home, Primary  none  -ORACIO     Row Name 08/21/21 1317          Cognition    Orientation Status (Cognition)  oriented x 3  -ORACIO     Row Name 08/21/21 1317          Safety Issues, Functional Mobility    Safety Issues Affecting Function (Mobility)  insight into deficits/self-awareness;positioning of assistive device;problem-solving;sequencing abilities  -ORACIO     Impairments Affecting Function (Mobility)  balance;endurance/activity tolerance;postural/trunk control;strength;shortness of breath  -ORACIO       User Key  (r) = Recorded By, (t) = Taken By, (c) = Cosigned By    Initials Name Provider Type    Lois Mata OT Occupational Therapist        Mobility/ADL's     Row Name 08/21/21 1319          Bed Mobility    Bed Mobility  supine-sit  -ORACIO     Supine-Sit [place] (Bed Mobility)  supervision  -ORACIO     Assistive Device (Bed Mobility)  bed rails;head of bed elevated  -ORACIO     Comment (Bed Mobility)  extra time and effort needed  -ORACIO     Row Name 08/21/21 1319          Transfers    Transfers  sit-stand transfer;toilet transfer  -ORACIO     Comment (Transfers)  cues for HP and sequencing  -ORCAIO     Sit-Stand [place] (Transfers)  minimum assist (75% patient effort);verbal cues;1 person assist  -ORACIO     [place] Level (Toilet Transfer)  minimum assist (75% patient effort);verbal cues;1 person assist  -ORACIO     Assistive Device (Toilet Transfer)  commode;grab bars/safety frame  -ORACIO     Row Name 08/21/21 1319           Sit-Stand Transfer    Assistive Device (Sit-Stand Transfers)  walker, front-wheeled  -     Row Name 08/21/21 1319          Toilet Transfer    Type (Toilet Transfer)  stand-sit;sit-stand;stand pivot/stand step  -     Row Name 08/21/21 1319          Functional Mobility    Functional Mobility- Comment  Using w/c with TotalA to get to/from bathroom from bedside  -     Row Name 08/21/21 1319          Activities of Daily Living    BADL Assessment/Intervention  lower body dressing;grooming;toileting  -     Row Name 08/21/21 1319          Lower Body Dressing Assessment/Training    [place] Level (Lower Body Dressing)  don;socks;doff;pants/bottoms;maximum assist (25% patient effort)  -ORACIO     Position (Lower Body Dressing)  edge of bed sitting;unsupported sitting  -ORACIO     Comment (Lower Body Dressing)  Assist to change underwear and pull up in supported standing  -ORACIO     Row Name 08/21/21 1319          Grooming Assessment/Training    [place] Level (Grooming)  wash face, hands;set up  -ORACIO     Position (Grooming)  supported sitting  -ORACIO     Comment (Grooming)  Face/hand washing completed at sink with assist positioning w/c at countertop  -ORACIO     Row Name 08/21/21 1319          Toileting Assessment/Training    [place] Level (Toileting)  adjust/manage clothing;maximum assist (25% patient effort);perform perineal hygiene;set up  -ORACIO     Assistive Devices (Toileting)  commode;grab bar/safety frame  -ORACIO     Position (Toileting)  unsupported sitting;supported sitting  -ORACIO     Comment (Toileting)  Pt completed pericare seated on commode with OROSCO; assist managing underwear up in supported standing  -ORACIO       User Key  (r) = Recorded By, (t) = Taken By, (c) = Cosigned By    Initials Name Provider Type    Lois Mata OT Occupational Therapist        Obj/Interventions     Row Name 08/21/21 1330          Sensory Assessment (Somatosensory)    Sensory Assessment (Somatosensory)  UE sensation intact  -[facility]  Name 08/21/21 1330          Vision Assessment/Intervention    Visual Impairment/Limitations  WFL;other (see comments) For purposes of eval  -ORACIO     Row Name 08/21/21 1330          Range of Motion Comprehensive    General Range of Motion  bilateral upper extremity ROM WFL  -ORACIO     Row Name 08/21/21 1330          Strength Comprehensive (MMT)    Comment, General Manual Muscle Testing (MMT) Assessment  BUE's grossly 4-/5  -ORACIO     Row Name 08/21/21 1330          Balance    Balance Assessment  sitting static balance;sitting dynamic balance;standing static balance;standing dynamic balance  -ORACIO     Static Sitting Balance  WFL;unsupported;sitting, edge of bed  -ORACIO     Dynamic Sitting Balance  WFL;unsupported;sitting in chair  -ORACIO     Static Standing Balance  mild impairment;supported;standing  -ORACIO     Dynamic Standing Balance  mild impairment;supported;standing  -ORACIO     Balance Interventions  standing;occupation based/functional task  -ORACIO     Comment, Balance  Dep for toileting tasks standing at safety rail.  -ORACIO       User Key  (r) = Recorded By, (t) = Taken By, (c) = Cosigned By    Initials Name Provider Type    Lois Mata OT Occupational Therapist        Goals/Plan    No documentation.       Clinical Impression     Row Name 08/21/21 1333          Pain Assessment    Additional Documentation  Pain Scale: FACES Pre/Post-Treatment (Group)  -     Row Name 08/21/21 0410          Pain Scale: FACES Pre/Post-Treatment    Pain: FACES Scale, Pretreatment  0-->no hurt  -ORACIO     Posttreatment Pain Rating  0-->no hurt  -     Row Name 08/21/21 1332          Plan of Care Review    Plan of Care Reviewed With  patient;spouse  -     Outcome Summary  OT eval completed. Pt presents with weakness and decreased standing tolerance limiting ADL's. Pt completed bed mobility with SUP/extra time and effort, STS from EOB to RW with MinAx1 to transfer to w/c. Pt Dep for accessing bathroom via w/c, MinAx1 for transfer on/off toilet using  grab bar. Pt performed pericare seated, Dep for clothing management. Potential barriers in home environment and DME needs assessed in preparation for discharge home with family assist.  -ORACIO     Row Name 08/21/21 1333          Therapy Assessment/Plan (OT)    Criteria for Skilled Therapeutic Interventions Met (OT)  patient/family refuse skilled intervention at this time  -ORACIO     Row Name 08/21/21 1333          Therapy Plan Review/Discharge Plan (OT)    Equipment Needs Upon Discharge (OT)  commode chair;walker, dalia  -ORACIO     Anticipated Discharge Disposition (OT)  home with 24/7 care  -ORACIO     Row Name 08/21/21 1333          Vital Signs    Pre Systolic BP Rehab  110  -ORACIO     Pre Treatment Diastolic BP  67  -ORACIO     Post Systolic BP Rehab  101  -ORACIO     Post Treatment Diastolic BP  64  -ORACIO     Pretreatment Heart Rate (beats/min)  93  -ORACIO     Intratreatment Heart Rate (beats/min)  105  -ORACIO     Posttreatment Heart Rate (beats/min)  90  -ORACIO     Pre SpO2 (%)  100  -ORACIO     O2 Delivery Pre Treatment  supplemental O2  -ORACIO     Intra SpO2 (%)  86  -ORACIO     O2 Delivery Intra Treatment  room air  -ORACIO     Post SpO2 (%)  98  -ORACIO     O2 Delivery Post Treatment  supplemental O2  -ORACIO     Pre Patient Position  Supine  -ORACIO     Intra Patient Position  Standing  -ORACIO     Post Patient Position  Supine  -ORACIO     Row Name 08/21/21 1333          Positioning and Restraints    Pre-Treatment Position  in bed  -ORACIO     Post Treatment Position  bed  -ORACIO     In Bed  fowlers;call light within reach;encouraged to call for assist;with family/caregiver;with nsg  -ORACIO       User Key  (r) = Recorded By, (t) = Taken By, (c) = Cosigned By    Initials Name Provider Type    Lois Mata, OT Occupational Therapist        Outcome Measures     Row Name 08/21/21 1342          How much help from another is currently needed...    Putting on and taking off regular lower body clothing?  2  -ORACIO     Bathing (including washing, rinsing, and drying)  2  -ORACIO     Toileting  (which includes using toilet bed pan or urinal)  3  -ORACIO     Putting on and taking off regular upper body clothing  3  -ORACIO     Taking care of personal grooming (such as brushing teeth)  3  -ORACIO     Eating meals  3  -ORACIO     AM-PAC 6 Clicks Score (OT)  16  -ORACIO     Row Name 08/21/21 1337          How much help from another person do you currently need...    Turning from your back to your side while in flat bed without using bedrails?  3  -KG     Moving from lying on back to sitting on the side of a flat bed without bedrails?  3  -KG     Moving to and from a bed to a chair (including a wheelchair)?  2  -KG     Standing up from a chair using your arms (e.g., wheelchair, bedside chair)?  3  -KG     Climbing 3-5 steps with a railing?  2  -KG     To walk in hospital room?  2  -KG     AM-PAC 6 Clicks Score (PT)  15  -KG     Row Name 08/21/21 1342 08/21/21 1337       Functional Assessment    Outcome Measure Options  AM-PAC 6 Clicks Daily Activity (OT)  -ORACIO  AM-PAC 6 Clicks Basic Mobility (PT)  -KG      User Key  (r) = Recorded By, (t) = Taken By, (c) = Cosigned By    Initials Name Provider Type    KG Yessy Soria Physical Therapist    Lois Mata OT Occupational Therapist        Occupational Therapy Education                 Title: PT OT SLP Therapies (In Progress)     Topic: Occupational Therapy (In Progress)     Point: ADL training (Done)     Description:   Instruct learner(s) on proper safety adaptation and remediation techniques during self care or transfers.   Instruct in proper use of assistive devices.              Learning Progress Summary           Patient Acceptance, E, VU by ORACIO at 8/21/2021 1343    Comment: Role of OT; DME/AE needs assessment; PLB   Family Acceptance, E, VU by ORACIO at 8/21/2021 1343    Comment: Role of OT; DME/AE needs assessment; PLB                   Point: Home exercise program (Not Started)     Description:   Instruct learner(s) on appropriate technique for monitoring, assisting and/or  progressing therapeutic exercises/activities.              Learner Progress:  Not documented in this visit.          Point: Precautions (Done)     Description:   Instruct learner(s) on prescribed precautions during self-care and functional transfers.              Learning Progress Summary           Patient Acceptance, E, VU by ORACIO at 8/21/2021 1343    Comment: Role of OT; DME/AE needs assessment; PLB   Family Acceptance, E, VU by ORACIO at 8/21/2021 1343    Comment: Role of OT; DME/AE needs assessment; PLB                   Point: Body mechanics (Done)     Description:   Instruct learner(s) on proper positioning and spine alignment during self-care, functional mobility activities and/or exercises.              Learning Progress Summary           Patient Acceptance, E, VU by ORACIO at 8/21/2021 1343    Comment: Role of OT; DME/AE needs assessment; PLB   Family Acceptance, E, VU by ORACIO at 8/21/2021 1343    Comment: Role of OT; DME/AE needs assessment; PLB                               User Key     Initials Effective Dates Name Provider Type Discipline     06/16/21 -  Lois Welch OT Occupational Therapist OT              OT Recommendation and Plan  Retired Outcome Summary/Treatment Plan (OT)  Anticipated Discharge Disposition (OT): home with 24/7 care  Plan of Care Review  Plan of Care Reviewed With: patient, spouse  Outcome Summary: OT eval completed. Pt presents with weakness and decreased standing tolerance limiting ADL's. Pt completed bed mobility with SUP/extra time and effort, STS from EOB to RW with MinAx1 to transfer to w/c. Pt Dep for accessing bathroom via w/c, MinAx1 for transfer on/off toilet using grab bar. Pt performed pericare seated, Dep for clothing management. Potential barriers in home environment and DME needs assessed in preparation for discharge home with family assist.  Plan of Care Reviewed With: patient, spouse  Outcome Summary: OT eval completed. Pt presents with weakness and decreased standing  tolerance limiting ADL's. Pt completed bed mobility with SUP/extra time and effort, STS from EOB to RW with MinAx1 to transfer to w/c. Pt Dep for accessing bathroom via w/c, MinAx1 for transfer on/off toilet using grab bar. Pt performed pericare seated, Dep for clothing management. Potential barriers in home environment and DME needs assessed in preparation for discharge home with family assist.     Time Calculation:   Time Calculation- OT     Row Name 08/21/21 1055             Time Calculation- OT    OT Start Time  1055  -ORACIO      OT Received On  08/21/21  -ORACIO      OT Goal Re-Cert Due Date  08/31/21  -ORACIO         Untimed Charges    OT Eval/Re-eval Minutes  50  -ORACIO         Total Minutes    Untimed Charges Total Minutes  50  -ORACIO       Total Minutes  50  -ORACIO        User Key  (r) = Recorded By, (t) = Taken By, (c) = Cosigned By    Initials Name Provider Type    Lois Mata OT Occupational Therapist        Therapy Charges for Today     Code Description Service Date Service Provider Modifiers Qty    [id number] HC OT EVAL LOW COMPLEXITY 4 8/21/2021 Lois Welch OT GO 1               Lois Welch OT  8/21/2021

## 2021-08-21 NOTE — PROGRESS NOTES
[facility] Medicine Services  PROGRESS NOTE    Patient Name: Shannan Jha  : 1970  MRN: [id number]    Date of Admission: 2021  Primary Care Physician: Maricel Quinteros MD    Subjective   Subjective     CC:  F/U FTT    HPI:  Patient seen this morning. Rested well overnight, no complaints this morning. Denies pain or nausea.     ROS:  CV-no chest pain, no palpitations  Resp-no cough, no dyspnea  GI-no N/V/D, no abd pain    All other systems reviewed and negative except any additional pertinent positives and negatives as discussed in HPI.       Objective   Objective     Vital Signs:   Temp:  [98 °F (36.7 °C)-98.5 °F (36.9 °C)] 98.2 °F (36.8 °C)  Heart Rate:  [89-99] 93  Resp:  [16] 16  BP: (105-117)/(66-78) 109/66  Flow (L/min):  [1] 1     Physical Exam:  Gen-no acute distress, pale appearance  HENT-NCAT, mucous membranes moist  Resp-nonlabored on room air  Neuro-A&Ox3 currently, no focal deficits  Psych-appropriate mood      Results Reviewed:  LAB RESULTS:      Lab 21  1418   WBC 3.72  --  4.28 4.70   HEMOGLOBIN 7.3*  --  6.4* 6.4*   HEMATOCRIT 22.2*  --  18.9* 18.9*   PLATELETS 100*  --  135* 177   NEUTROS ABS  --   --  3.03 3.80   IMMATURE GRANS (ABS)  --   --  0.43*  --    LYMPHS ABS  --   --  0.41* 0.60*   MONOS ABS  --   --  0.35 0.30   EOS ABS  --   --  0.05  --    MCV 84.7  --  78.1* 80.7   LACTATE  --  2.0  --   --          Lab 21  1551 21  1243 21  0824 2119/21  1749   SODIUM 127* 125* 125* 123* 123*   < > 125*   < > 124*   < > 125*   POTASSIUM 3.7 4.1 4.4 3.6 4.3   < > 3.9   < > 3.9   < > 4.4   CHLORIDE 91* 89* 89* 87* 86*   < > 86*   < > 85*   < > 84*   CO2 27.0 24.0 23.0 26.0 25.0   < > 20.0*   < > 25.0   < > 23.0   ANION GAP 9.0 12.0 13.0 10.0 12.0   < > 19.0*   < > 14.0   < > 18.0*   BUN 7  8 9 9 10   < > 10   < > 11   < > 13   CREATININE 0.33* 0.37* 0.40* 0.39* 0.37*   < > 0.35*   < > 0.44*   < > 0.49*   GLUCOSE 174* 215* 214* 222* 246*   < > 273*   < > 285*   < > 362*   CALCIUM 8.1* 8.3* 8.3* 8.6 8.7   < > 8.7   < > 8.7   < > 9.4   IONIZED CALCIUM  --   --   --   --   --   --   --   --   --   --  1.22   MAGNESIUM  --   --   --   --   --   --   --   --  1.8  --  1.9   PHOSPHORUS  --   --   --   --   --   --   --   --   --   --  3.7   HEMOGLOBIN A1C  --   --   --   --   --   --   --   --   --   --  7.80*   TSH  --   --   --   --   --   --  18.690*  --   --   --   --     < > = values in this interval not displayed.         Lab 08/19/21 1749   TOTAL PROTEIN 6.5   ALBUMIN 3.10*   GLOBULIN 3.4   ALT (SGPT) 22   AST (SGOT) 86*   BILIRUBIN 1.4*   ALK PHOS 330*                 Lab 08/19/21 1749   ABO TYPING O   RH TYPING Positive   ANTIBODY SCREEN Negative         Brief Urine Lab Results  (Last result in the past 365 days)      Color   Clarity   Blood   Leuk Est   Nitrite   Protein   CREAT   Urine HCG        08/20/21 0622 Yellow Clear Negative Trace Negative 30 mg/dL (1+)               Microbiology Results Abnormal     Procedure Component Value - Date/Time    Urine Culture - Urine, Urine, Catheter In/Out [[id number]]  (Normal) Collected: 08/19/21 2158    Lab Status: Final result Specimen: Urine, Catheter In/Out Updated: 08/20/21 2123     Urine Culture No growth    COVID PRE-OP / PRE-PROCEDURE SCREENING ORDER (NO ISOLATION) - Swab, Nasopharynx [[id number]]  (Normal) Collected: 08/19/21 2015    Lab Status: Final result Specimen: Swab from Nasopharynx Updated: 08/19/21 2034    Narrative:      The following orders were created for panel order COVID PRE-OP / PRE-PROCEDURE SCREENING ORDER (NO ISOLATION) - Swab, Nasopharynx.  Procedure                               Abnormality         Status                     ---------                               -----------         ------                     COVID-19, ABBOTT  IN-HOUS...[[id number]]  Normal              Final result                 Please view results for these tests on the individual orders.    COVID-19, ABBOTT IN-HOUSE,NASAL Swab (NO TRANSPORT MEDIA) 2 HR TAT - Swab, Nasopharynx [[id number]]  (Normal) Collected: 08/19/21 2015    Lab Status: Final result Specimen: Swab from Nasopharynx Updated: 08/19/21 2034     COVID19 Presumptive Negative    Narrative:      Fact sheet for providers: https://www.fda.gov/media/570347/download     Fact sheet for patients: https://www.fda.gov/media/818864/download    Test performed by PCR.  If inconsistent with clinical signs and symptoms patient should be tested with different authorized molecular test.          CT Head With & Without Contrast    Result Date: 8/19/2021  HISTORY: Mental status change, metastatic cancer TECHNIQUE: Axial head CT with and without IV contrast. Radiation dose reduction techniques included automated exposure control or exposure modulation based on body size. Count of known CT and cardiac nuc med studies performed in previous 12 months: 0. COMPARISON: None. FINDINGS: Prominently dural based lesion is seen abutting the left posterior cerebellar hemisphere measuring approximately 2 x 1.1 cm that demonstrates enhancement. There also appears to be some potential dural thickening at the posterior apex abutting the posterior left frontal lobe, although this is somewhat equivocal. There is no definite large intraparenchymal lesion although subtle smaller lesions cannot be entirely excluded. There is no definite osseous abnormality. The sphenoid sinuses are opacified.     Impression: Overall, the findings are concerning for potential dural based metastases. The largest abuts the posterior left cerebellum and there is an equivocal second lesion at the left posterior vertex. In addition, small intraparenchymal metastases are not excluded. Consider follow-up MRI with contrast. Signer Name: Martina Malin MD  Signed: 8/19/2021  9:21 PM  Workstation Name: [id number]  [facility]    CT Abdomen Pelvis With Contrast    Result Date: 8/19/2021  CT Abdomen Pelvis W, CTA Chest INDICATION: Mental status change, endometrial carcinoma, assess for progression TECHNIQUE: CTA of the chest. CT of the abdomen and pelvis without IV contrast. Coronal and sagittal reconstructions were obtained.  Radiation dose reduction techniques included automated exposure control or exposure modulation based on body size. Count of known CT and cardiac nuc med studies performed in previous 12 months: 0. COMPARISON: CT of the abdomen from 7/2/21 FINDINGS: CTA chest: There is no definite evidence of pulmonary embolism although there is somewhat limitation of evaluation for small subsegmental embolus. The patient's left supraclavicular lymphadenopathy and left low cervical lymphadenopathy has significantly increased in size and there is shift of the trachea to the right. Multilevel mediastinal nodes have significantly increased in size and there is significant narrowing of the eleonora and mainstem bronchi bilaterally. The esophageal lumen is also compressed and may be significantly involved. There are new small bilateral pleural effusions, greater on the right as well as multiple new lung metastases noted bilaterally. Bone marrow attenuation is somewhat mottled and underlying metastatic disease cannot be entirely excluded although no definite focal lesion is identified. Abdomen: Multiple new metastatic lesions are noted throughout the liver. Lesions are noted within the bilateral adrenal glands, and the spleen is not well evaluated due to beam hardening artifact. However, there is suspicion for multiple splenic lesions as well. Multiple large ejana masses are seen scattered throughout the abdomen. There is also flattening of the inferior vena cava secondary to jeana mass. Pelvis: Malignant lesion abuts the wall of the rectum anteriorly. There is a small  amount of fluid in the pelvis. Multiple abnormal aortocaval and periaortic nodes are noted. No acute bowel abnormality is appreciated. No acute osseous abnormality.     Impression: 1. There has been gross interval progression of metastatic disease as described above throughout the chest, abdomen and pelvis. 2. There is no definite evidence of pulmonary embolism although there is somewhat limitation of evaluation for small subsegmental embolus. Signer Name: Martina Malin MD  Signed: 8/19/2021 9:31 PM  Workstation Name: [id number]  [facility] [place]    FL Video Swallow With Speech Single Contrast    Result Date: 8/20/2021  EXAMINATION: FL VIDEO SWALLOW W SPEECH SINGLE-CONTRAST-, FL LIMITED UGI FOR MBS REFLUX SINGLE-CONTRAST-  INDICATION: dysphagia, known esophageal displacement per imaging  TECHNIQUE: 1 minute and 18 seconds of fluoroscopic time was used. 8 associated fluoroscopic loops were saved. The patient was evaluated in the seated lateral position while taking a variety of consistencies of barium by mouth under the direction of speech pathology.  COMPARISON: NONE  FINDINGS: 1 minute and 18 seconds of fluoroscopy provided for a modified barium swallow, and limited upper GI series. Please see speech therapy report for full details and recommendations. There is a moderate sized filling defect seen in the area of the oral pharynx. This filling defect persists throughout multiple images, but does not appear to impede swallowing of barium, however further evaluation may be considered if clinically relevant. No focal esophageal strictures were seen. Gastroesophageal reflux was not demonstrated during this exam.       Impression: Fluoroscopy provided for a modified barium swallow, and limited upper GI series. Please see speech therapy report for full details and recommendations. Moderate sized filling defect seen in the area of the oropharynx, which does not appear to significantly impede swallowing of  barium. Further evaluation may be considered if clinically relevant.    This report was finalized on 8/20/2021 4:31 PM by Dr. Jun Gonzales.      FL Limited Ugi For Mbs Reflux Single-Contrast    Result Date: 8/20/2021  EXAMINATION: FL VIDEO SWALLOW W SPEECH SINGLE-CONTRAST-, FL LIMITED UGI FOR MBS REFLUX SINGLE-CONTRAST-  INDICATION: dysphagia, known esophageal displacement per imaging  TECHNIQUE: 1 minute and 18 seconds of fluoroscopic time was used. 8 associated fluoroscopic loops were saved. The patient was evaluated in the seated lateral position while taking a variety of consistencies of barium by mouth under the direction of speech pathology.  COMPARISON: NONE  FINDINGS: 1 minute and 18 seconds of fluoroscopy provided for a modified barium swallow, and limited upper GI series. Please see speech therapy report for full details and recommendations. There is a moderate sized filling defect seen in the area of the oral pharynx. This filling defect persists throughout multiple images, but does not appear to impede swallowing of barium, however further evaluation may be considered if clinically relevant. No focal esophageal strictures were seen. Gastroesophageal reflux was not demonstrated during this exam.       Impression: Fluoroscopy provided for a modified barium swallow, and limited upper GI series. Please see speech therapy report for full details and recommendations. Moderate sized filling defect seen in the area of the oropharynx, which does not appear to significantly impede swallowing of barium. Further evaluation may be considered if clinically relevant.    This report was finalized on 8/20/2021 4:31 PM by Dr. Jun Gonzales.      CT Angiogram Chest    Result Date: 8/19/2021  CT Abdomen Pelvis W, CTA Chest INDICATION: Mental status change, endometrial carcinoma, assess for progression TECHNIQUE: CTA of the chest. CT of the abdomen and pelvis without IV contrast. Coronal and sagittal reconstructions were  obtained.  Radiation dose reduction techniques included automated exposure control or exposure modulation based on body size. Count of known CT and cardiac nuc med studies performed in previous 12 months: 0. COMPARISON: CT of the abdomen from 7/2/21 FINDINGS: CTA chest: There is no definite evidence of pulmonary embolism although there is somewhat limitation of evaluation for small subsegmental embolus. The patient's left supraclavicular lymphadenopathy and left low cervical lymphadenopathy has significantly increased in size and there is shift of the trachea to the right. Multilevel mediastinal nodes have significantly increased in size and there is significant narrowing of the eleonora and mainstem bronchi bilaterally. The esophageal lumen is also compressed and may be significantly involved. There are new small bilateral pleural effusions, greater on the right as well as multiple new lung metastases noted bilaterally. Bone marrow attenuation is somewhat mottled and underlying metastatic disease cannot be entirely excluded although no definite focal lesion is identified. Abdomen: Multiple new metastatic lesions are noted throughout the liver. Lesions are noted within the bilateral adrenal glands, and the spleen is not well evaluated due to beam hardening artifact. However, there is suspicion for multiple splenic lesions as well. Multiple large jeana masses are seen scattered throughout the abdomen. There is also flattening of the inferior vena cava secondary to jeana mass. Pelvis: Malignant lesion abuts the wall of the rectum anteriorly. There is a small amount of fluid in the pelvis. Multiple abnormal aortocaval and periaortic nodes are noted. No acute bowel abnormality is appreciated. No acute osseous abnormality.     Impression: 1. There has been gross interval progression of metastatic disease as described above throughout the chest, abdomen and pelvis. 2. There is no definite evidence of pulmonary embolism  although there is somewhat limitation of evaluation for small subsegmental embolus. Signer Name: Martina Malin MD  Signed: 8/19/2021 9:31 PM  Workstation Name: [id number]  [facility] [place]          I have reviewed the medications:  Scheduled Meds:busPIRone, 5 mg, Oral, TID  castor oil-balsam peru, , Topical, Q12H  cefTRIAXone, 1 g, Intravenous, Q24H  dexamethasone, 4 mg, Intravenous, Q6H  docusate sodium, 200 mg, Oral, Daily  gabapentin, 300 mg, Oral, TID  insulin lispro, 0-14 Units, Subcutaneous, Q3H  levETIRAcetam, 1,000 mg, Intravenous, Q12H  levothyroxine, 250 mcg, Oral, Once per day on Mon Tue Wed Thu Fri Sat  megestrol, 80 mg, Oral, BID  Morphine, 30 mg, Oral, BID  nystatin, 5 mL, Swish & Swallow, 4x Daily  pantoprazole, 40 mg, Oral, BID AC  PARoxetine, 20 mg, Oral, Daily  polyethylene glycol, 17 g, Oral, Daily  senna, 2 tablet, Oral, Nightly  sodium chloride, 10 mL, Intravenous, Q12H      Continuous Infusions:sodium chloride, 75 mL/hr, Last Rate: 75 mL/hr (08/21/21 0253)      PRN Meds:.dextrose  •  dextrose  •  glucagon (human recombinant)  •  magnesium sulfate **OR** magnesium sulfate in D5W 1g/100mL (PREMIX) **OR** magnesium sulfate  •  Morphine  •  ondansetron **OR** ondansetron  •  oxyCODONE  •  sodium chloride    Assessment/Plan   Assessment & Plan     Active Hospital Problems    Diagnosis  POA   • **Endometrial cancer (CMS/HCC) [C54.1]  Yes   • Hyponatremia [E87.1]  Yes   • UTI (urinary tract infection), bacterial [N39.0, A49.9]  Yes   • Acute anemia [D64.9]  Yes   • Generalized weakness [R53.1]  Yes   • Endometrial cancer, FIGO stage IVB (CMS/HCC) [C54.1]  Yes   • Drug induced constipation [K59.03]  Yes   • Cancer related pain [G89.3]  Yes   • Dehydration [E86.0]  Yes   • Type 2 diabetes mellitus (CMS/HCC) [E11.9]  Yes      Resolved Hospital Problems   No resolved problems to display.        Brief Hospital Course to date:  Shannan Jha is a 51 y.o. female with hx of HTN, HLD, DM2,  hypothyroidism, and recently diagnosed metastatic endometrial cancer in May 2021, undergoing treatment with Dr. Jensen with Paclitaxel/Carboplatin, who presents from Dr. Jensen's office due to worsening weakness, decreased oral intake, pain, and lab work showing worsening anemia. Admitted to hospitalist service. Further lab work revealed hyponatremia with Na 125 along with UTI. CT head/chest/abd/pelvis revealed new brain metastases as well as interval progression of metastatic disease throughout the chest, abdomen, and pelvis.    *All problems are new to me today.    Metastatic endometrial cancer, rapidly progressive  Encephalopathy/confusion  Malignancy-related pain  --CT scans suggesting new brain mets, lung mets, adenopathy with mainstem bronchi and tracheal displacement, diffuse liver disease, and adenopathy displacing the inferior vena cava.  --Patient refused MRI brain.  --Continue empiric Keppra.  --Continue Decadron.  --Dr. Jensen consulted. Has discussed with patient and family. They would like to go home with hospice.  --Palliative Care following.  --Hospice to meet with family today.    Anemia  --Likely related to chemotherapy.  --Transfused 2 units PRBC this admission.    Hyponatremia  --Likely secondary to volume depletion/poor oral intake.  --Gentle IV fluids.  --Improved today.     UTI  --Continue Rocephin through today (3 days total).  --Urine culture negative.    DM2 with hyperglycemia  --HbA1c 7.8%.  --SSI.    Thrush  --Nystatin swish and swallow.    DVT prophylaxis:  Mechanical DVT prophylaxis orders are present.            Disposition: I expect the patient to be discharged home with hospice possibly over the weekend if arrangements can be made    CODE STATUS:   Code Status and Medical Interventions:   Ordered at: 08/20/21 0747     Limited Support to NOT Include:    Intubation    Cardioversion/Defibrillation     Code Status:    No CPR     Medical Interventions (Level of Support Prior to Arrest):     Limited       Trang Holbrook MD  08/21/21

## 2021-08-21 NOTE — PLAN OF CARE
Goal Outcome Evaluation:  Plan of Care Reviewed With: patient, spouse        Progress: no change  Outcome Summary: PT eval performed: Patient presenting with recent decline in mobility and poor prognosis.  Min-A for transfers and ambulation with RW.  Only able to ambulate a few feet and uses WC for longer distances.  Patient will benefit from a FWW and bedside commode at d/c and recommend home with assist and probable hospice.  Family not wanting further therapy services and will focus on comfort care.

## 2021-08-21 NOTE — PLAN OF CARE
Goal Outcome Evaluation:  Plan of Care Reviewed With: patient, spouse           Outcome Summary: OT eval completed. Pt presents with weakness and decreased standing tolerance limiting ADL's. Pt completed bed mobility with SUP/extra time and effort, STS from EOB to RW with MinAx1 to transfer to w/c. Pt Dep for accessing bathroom via w/c, MinAx1 for transfer on/off toilet using grab bar. Pt performed pericare seated, Dep for clothing management. Potential barriers in home environment and DME needs assessed in preparation for discharge home with family assist.

## 2021-08-21 NOTE — PROGRESS NOTES
"     Gynecologic Oncology In-Patient Progress Note      Patient Name: Shannan Jha  : 1970   MRN: [id number]       Subjective   Patient and  doing okay today. Patient reports pain very well managed since inpatient. Denies any problems with hip and neck pain. Swallowing remains the same. Patient reports food is actually easier to swallow. Pills are most difficult.  reports abnormal arm movements while patient is sleeping. Patient also now reporting a week long history of vaginal discharge but is mostly unconcerned about this.     Review of Systems:   Review of Systems   Constitutional: Positive for activity change, appetite change and fatigue.   HENT: Positive for trouble swallowing.    Eyes: Positive for visual disturbance.   Respiratory: Positive for choking. Negative for shortness of breath.    Cardiovascular: Negative for chest pain and palpitations.   Gastrointestinal: Negative for abdominal pain.   Genitourinary: Positive for vaginal discharge.   Musculoskeletal: Negative for arthralgias and myalgias.   Neurological: Positive for dizziness, weakness and light-headedness.   Hematological: Positive for adenopathy.        Objective     Physical Exam:  Vital Signs:   Vitals:    21 1217 21 1925 21 2358 21 1008   BP: 110/78 105/67 109/66 110/67   BP Location: Left arm Left arm  Left arm   Patient Position: Sitting Sitting  Sitting   Pulse: 99 89 93 86   Resp: 16 16  16   Temp: 98 °F (36.7 °C) 98.2 °F (36.8 °C)  98.8 °F (37.1 °C)   TempSrc: Oral Oral  Axillary   SpO2: 96% 93% 92% 96%   Weight:  91.6 kg (202 lb)     Height:  175.3 cm (69.02\")       BMI: Body mass index is 29.82 kg/m².   Weight:   Wt Readings from Last 3 Encounters:   21 91.6 kg (202 lb)   21 91.6 kg (202 lb)   21 91.6 kg (202 lb)     I&O: I/O last 3 completed shifts:  In: 3478 [I.V.:2814; Blood:564; IV Piggyback:100]  Out: 930 [Urine:930]    Physical Exam  Vitals and nursing note " reviewed.   Constitutional:       Appearance: She is ill-appearing. She is not diaphoretic.   HENT:      Head: Normocephalic and atraumatic.      Nose: Nose normal.      Comments: Nasal cannula  Eyes:      General: No scleral icterus.        Right eye: No discharge.         Left eye: No discharge.   Cardiovascular:      Rate and Rhythm: Normal rate and regular rhythm.   Pulmonary:      Effort: Pulmonary effort is normal. No respiratory distress.   Genitourinary:     Comments: Deferred  Skin:     Comments: Flushing noted on right cheek   Neurological:      Mental Status: She is disoriented.      Comments: Occasional garbled speech         Inpatient Medications:   Scheduled Meds:busPIRone, 5 mg, Oral, TID  castor oil-balsam peru, , Topical, Q12H  cefTRIAXone, 1 g, Intravenous, Q24H  dexamethasone, 4 mg, Intravenous, Q6H  docusate sodium, 200 mg, Oral, Daily  gabapentin, 300 mg, Oral, TID  insulin lispro, 0-14 Units, Subcutaneous, Q3H  levETIRAcetam, 1,000 mg, Intravenous, Q12H  levothyroxine, 250 mcg, Oral, Once per day on Mon Tue Wed Thu Fri Sat  megestrol, 80 mg, Oral, BID  Morphine, 30 mg, Oral, BID  nystatin, 5 mL, Swish & Swallow, 4x Daily  pantoprazole, 40 mg, Oral, BID AC  PARoxetine, 20 mg, Oral, Daily  polyethylene glycol, 17 g, Oral, Daily  senna, 2 tablet, Oral, Nightly  sodium chloride, 10 mL, Intravenous, Q12H      Continuous Infusions:sodium chloride, 75 mL/hr, Last Rate: 75 mL/hr (08/21/21 0253)      PRN Meds:.dextrose  •  dextrose  •  glucagon (human recombinant)  •  magnesium sulfate **OR** magnesium sulfate in D5W 1g/100mL (PREMIX) **OR** magnesium sulfate  •  Morphine  •  ondansetron **OR** ondansetron  •  oxyCODONE  •  sodium chloride      Results:   Lab Results   Component Value Date    WBC 3.72 08/20/2021    HGB 7.3 (L) 08/20/2021    HCT 22.2 (L) 08/20/2021    MCV 84.7 08/20/2021     (L) 08/20/2021       Lab Results   Component Value Date    GLUCOSE 215 (H) 08/21/2021    CALCIUM 8.3 (L)  08/21/2021     (L) 08/21/2021    K 4.0 08/21/2021    CO2 22.0 08/21/2021    CL 90 (L) 08/21/2021    BUN 7 08/21/2021    CREATININE 0.36 (L) 08/21/2021    EGFRIFNONA >150 08/21/2021    BCR 19.4 08/21/2021    ANIONGAP 13.0 08/21/2021        Assessment / Plan    This is a 51 y.o. female with rapidly progressive dedifferentiated uterine cancer. Symptoms currently being managed very well. Family to meet with home hospice today. Discussed patient's new complaints of vaginal discharge. This is likely due to patients progressive disease. Do not recommend pelvic exam as this would not add anything to current plan. I will continue to be available to Shannan and Humberto as needed.    Dispo: With home hospice    MIL Jensen MD  Gynecologic Oncology   Date: 08/21/21  Time: 13:15 EDT    Please note that portions of this note may have been completed with a voice recognition program.

## 2021-08-21 NOTE — PLAN OF CARE
Inpatient RN   Plan of Care Update  ________________________________________________    Patient Name:  Shannan Jha  YOB: 1970  MRN: [id number]  Admit Date:  8/19/2021      Assessment Date:  8/21/2021    Vital Signs stable, Sinus Rythym, Pt currently on  2-3. liters/min via nasal cannula Pt has been sleeping well with a Pain status of well controlled and finding no nausea and no vomiting.    Pt orientated to person, place, time and situation with LOC of alert.    UOP adequate.    Pt has no requests at this Time.         Electronically signed by:  Giselle Rizvi RN  08/21/21 04:43 EDT Inpatient RN   Plan of Care Update  ________________________________________________    Patient Name:  Shannan Jha  YOB: 1970  MRN: [id number]  Admit Date:  8/19/2021      Assessment Date:  8/21/2021    Vital Signs stable, Sinus Rythym, Pt currently on  2-3 liters/min via nasal cannula Pt has been sleeping well with a Pain status of well controlled and finding no nausea and no vomiting.    Pt orientated to person, place, time and situation with LOC of alert.    UOP adequate.    Pt has no requests at this Time.   Will monitor      Electronically signed by:  Giselle Rizvi RN  08/21/21 04:43 EDT      Problem: Adult Inpatient Plan of Care  Goal: Plan of Care Review  Outcome: Ongoing, Progressing  Flowsheets (Taken 8/20/2021 1558 by Areli Bravo, RN)  Progress: no change  Plan of Care Reviewed With: patient  Goal: Patient-Specific Goal (Individualized)  Outcome: Ongoing, Progressing  Goal: Absence of Hospital-Acquired Illness or Injury  Outcome: Ongoing, Progressing  Intervention: Identify and Manage Fall Risk  Recent Flowsheet Documentation  Taken 8/21/2021 0400 by Giselle Rizvi RN  Safety Promotion/Fall Prevention:   activity supervised   assistive device/personal items within reach   clutter free environment maintained   fall prevention program maintained   gait belt   lighting adjusted    nonskid shoes/slippers when out of bed   room organization consistent   safety round/check completed   toileting scheduled  Taken 8/21/2021 0200 by Giselle Rizvi RN  Safety Promotion/Fall Prevention:   activity supervised   assistive device/personal items within reach   clutter free environment maintained   fall prevention program maintained   gait belt   lighting adjusted   nonskid shoes/slippers when out of bed   room organization consistent   safety round/check completed   toileting scheduled  Taken 8/21/2021 0000 by Giselle Rizvi RN  Safety Promotion/Fall Prevention:   activity supervised   assistive device/personal items within reach   clutter free environment maintained   fall prevention program maintained   gait belt   lighting adjusted   nonskid shoes/slippers when out of bed   room organization consistent   safety round/check completed   toileting scheduled  Taken 8/20/2021 2200 by Giselle Rizvi RN  Safety Promotion/Fall Prevention:   activity supervised   assistive device/personal items within reach   clutter free environment maintained   fall prevention program maintained   gait belt   lighting adjusted   nonskid shoes/slippers when out of bed   room organization consistent   safety round/check completed   toileting scheduled  Taken 8/20/2021 2045 by Giselle Rizvi RN  Safety Promotion/Fall Prevention:   activity supervised   assistive device/personal items within reach   clutter free environment maintained   fall prevention program maintained   gait belt   lighting adjusted   nonskid shoes/slippers when out of bed   room organization consistent   safety round/check completed   toileting scheduled  Intervention: Prevent Skin Injury  Recent Flowsheet Documentation  Taken 8/21/2021 0400 by Giselle Rizvi, RN  Body Position: weight shift assistance provided  Skin Protection:   adhesive use limited   incontinence pads utilized   tubing/devices free from skin contact  Taken 8/21/2021 0200  by Giselle Rizvi RN  Body Position: weight shift assistance provided  Skin Protection:   adhesive use limited   incontinence pads utilized   tubing/devices free from skin contact  Taken 8/21/2021 0000 by Giselle Rizvi RN  Body Position: weight shift assistance provided  Skin Protection:   adhesive use limited   incontinence pads utilized   tubing/devices free from skin contact  Taken 8/20/2021 2200 by Giselle Rizvi RN  Body Position: weight shift assistance provided  Skin Protection:   adhesive use limited   incontinence pads utilized   tubing/devices free from skin contact  Taken 8/20/2021 2045 by Giselle Rizvi RN  Body Position: weight shift assistance provided  Skin Protection:   adhesive use limited   incontinence pads utilized   tubing/devices free from skin contact  Intervention: Prevent and Manage VTE (venous thromboembolism) Risk  Recent Flowsheet Documentation  Taken 8/20/2021 2045 by Giselle Rizvi RN  VTE Prevention/Management: (subQ lovenox)   bilateral   dorsiflexion/plantar flexion performed   bleeding risk factor(s) identified  Goal: Optimal Comfort and Wellbeing  Outcome: Ongoing, Progressing  Intervention: Provide Person-Centered Care  Recent Flowsheet Documentation  Taken 8/20/2021 2045 by Giselle Rizvi RN  Trust Relationship/Rapport:   care explained   choices provided   emotional support provided   empathic listening provided   questions answered   questions encouraged   reassurance provided   thoughts/feelings acknowledged  Goal: Readiness for Transition of Care  Outcome: Ongoing, Progressing     Problem: Fall Injury Risk  Goal: Absence of Fall and Fall-Related Injury  Outcome: Ongoing, Progressing  Intervention: Identify and Manage Contributors to Fall Injury Risk  Recent Flowsheet Documentation  Taken 8/20/2021 2045 by Giselle Rizvi RN  Medication Review/Management:   medications reviewed   high-risk medications identified  Intervention: Promote Injury-Free  Environment  Recent Flowsheet Documentation  Taken 8/21/2021 0400 by Giselle Rizvi RN  Safety Promotion/Fall Prevention:   activity supervised   assistive device/personal items within reach   clutter free environment maintained   fall prevention program maintained   gait belt   lighting adjusted   nonskid shoes/slippers when out of bed   room organization consistent   safety round/check completed   toileting scheduled  Taken 8/21/2021 0200 by Giselle Rizvi RN  Safety Promotion/Fall Prevention:   activity supervised   assistive device/personal items within reach   clutter free environment maintained   fall prevention program maintained   gait belt   lighting adjusted   nonskid shoes/slippers when out of bed   room organization consistent   safety round/check completed   toileting scheduled  Taken 8/21/2021 0000 by Giselle Rizvi RN  Safety Promotion/Fall Prevention:   activity supervised   assistive device/personal items within reach   clutter free environment maintained   fall prevention program maintained   gait belt   lighting adjusted   nonskid shoes/slippers when out of bed   room organization consistent   safety round/check completed   toileting scheduled  Taken 8/20/2021 2200 by Giselle Rizvi RN  Safety Promotion/Fall Prevention:   activity supervised   assistive device/personal items within reach   clutter free environment maintained   fall prevention program maintained   gait belt   lighting adjusted   nonskid shoes/slippers when out of bed   room organization consistent   safety round/check completed   toileting scheduled  Taken 8/20/2021 2045 by Giselle Rizvi RN  Safety Promotion/Fall Prevention:   activity supervised   assistive device/personal items within reach   clutter free environment maintained   fall prevention program maintained   gait belt   lighting adjusted   nonskid shoes/slippers when out of bed   room organization consistent   safety round/check completed   toileting  scheduled     Problem: Diabetes Comorbidity  Goal: Blood Glucose Level Within Desired Range  Outcome: Ongoing, Progressing  Intervention: Maintain Glycemic Control  Recent Flowsheet Documentation  Taken 8/21/2021 0000 by Giselle Rizvi RN  Glycemic Management: blood glucose monitoring  Taken 8/20/2021 2200 by Giselle Rizvi RN  Glycemic Management: blood glucose monitoring  Taken 8/20/2021 2045 by Giselle Rizvi RN  Glycemic Management: blood glucose monitoring     Problem: Pain Chronic (Persistent) (Comorbidity Management)  Goal: Acceptable Pain Control and Functional Ability  Outcome: Ongoing, Progressing  Intervention: Manage Persistent Pain  Recent Flowsheet Documentation  Taken 8/20/2021 2045 by Giselle Rizvi RN  Sleep/Rest Enhancement:   awakenings minimized   regular sleep/rest pattern promoted  Medication Review/Management:   medications reviewed   high-risk medications identified  Intervention: Optimize Psychosocial Wellbeing  Recent Flowsheet Documentation  Taken 8/20/2021 2045 by Giselle Rizvi RN  Supportive Measures:   active listening utilized   verbalization of feelings encouraged  Diversional Activities: television     Problem: Skin Injury Risk Increased  Goal: Skin Health and Integrity  Outcome: Ongoing, Progressing  Intervention: Optimize Skin Protection  Recent Flowsheet Documentation  Taken 8/21/2021 0400 by Giselle Rizvi RN  Pressure Reduction Techniques:   frequent weight shift encouraged   pressure points protected   weight shift assistance provided  Pressure Reduction Devices:   pressure-redistributing mattress utilized   positioning supports utilized  Skin Protection:   adhesive use limited   incontinence pads utilized   tubing/devices free from skin contact  Taken 8/21/2021 0200 by Giselle Rizvi RN  Pressure Reduction Techniques:   frequent weight shift encouraged   pressure points protected   weight shift assistance provided  Pressure Reduction Devices:    specialty bed utilized   positioning supports utilized  Skin Protection:   adhesive use limited   incontinence pads utilized   tubing/devices free from skin contact  Taken 8/21/2021 0000 by Giselle Rizvi RN  Pressure Reduction Techniques:   frequent weight shift encouraged   pressure points protected   weight shift assistance provided  Pressure Reduction Devices:   specialty bed utilized   positioning supports utilized  Skin Protection:   adhesive use limited   incontinence pads utilized   tubing/devices free from skin contact  Taken 8/20/2021 2200 by Giselle Rizvi RN  Pressure Reduction Techniques:   frequent weight shift encouraged   pressure points protected   weight shift assistance provided  Head of Bed (HOB): HOB elevated  Pressure Reduction Devices:   specialty bed utilized   positioning supports utilized  Skin Protection:   adhesive use limited   incontinence pads utilized   tubing/devices free from skin contact  Taken 8/20/2021 2045 by Giselle Rizvi RN  Pressure Reduction Techniques:   weight shift assistance provided   frequent weight shift encouraged   positioned off wounds   pressure points protected  Head of Bed (HOB): HOB elevated  Pressure Reduction Devices:   specialty bed utilized   positioning supports utilized  Skin Protection:   adhesive use limited   incontinence pads utilized   tubing/devices free from skin contact     Problem: Palliative Care  Goal: Enhanced Quality of Life  Outcome: Ongoing, Progressing  Intervention: Optimize Function  Recent Flowsheet Documentation  Taken 8/20/2021 2045 by Giselle Rizvi RN  Sleep/Rest Enhancement:   awakenings minimized   regular sleep/rest pattern promoted  Intervention: Optimize Psychosocial Wellbeing  Recent Flowsheet Documentation  Taken 8/20/2021 2045 by Giselle Rizvi RN  Supportive Measures:   active listening utilized   verbalization of feelings encouraged   Goal Outcome Evaluation:

## 2021-08-22 PROBLEM — E43 SEVERE MALNUTRITION (HCC): Status: ACTIVE | Noted: 2021-01-01

## 2021-08-22 NOTE — PROGRESS NOTES
[facility] Services  PROGRESS NOTE    Patient Name: Shannan Jha  : 1970  MRN: [id number]    Date of Admission: 2021  Primary Care Physician: Maricel Quinteros MD    Subjective   Subjective     CC:  F/U FTT    HPI:  Patient seen this morning. No complaints. She did have some hypoxia overnight, her nasal cannula had fallen off. Recovered after nurse replaced it. No dyspnea this morning.  at bedside.    ROS:  CV-no chest pain, no palpitations  Resp-no cough, no dyspnea  GI-no N/V/D, no abd pain    All other systems reviewed and negative except any additional pertinent positives and negatives as discussed in HPI.       Objective   Objective     Vital Signs:   Temp:  [97.1 °F (36.2 °C)-98.6 °F (37 °C)] 97.1 °F (36.2 °C)  Heart Rate:  [76] 76  Resp:  [16] 16  BP: ()/(64-70) 107/70  Flow (L/min):  [2-3] 3     Physical Exam:  Gen-no acute distress, pale appearance  HENT-NCAT, mucous membranes moist  Resp-nonlabored on 3 liters  Neuro-A&Ox3 currently, no focal deficits  Psych-appropriate mood    Results Reviewed:  LAB RESULTS:      Lab 21  04321  1749 21  1418   WBC 3.72  --  4.28 4.70   HEMOGLOBIN 7.3*  --  6.4* 6.4*   HEMATOCRIT 22.2*  --  18.9* 18.9*   PLATELETS 100*  --  135* 177   NEUTROS ABS  --   --  3.03 3.80   IMMATURE GRANS (ABS)  --   --  0.43*  --    LYMPHS ABS  --   --  0.41* 0.60*   MONOS ABS  --   --  0.35 0.30   EOS ABS  --   --  0.05  --    MCV 84.7  --  78.1* 80.7   LACTATE  --  2.0  --   --          Lab 21  0319 21  0028 21  1546 21  1204 21  0824 21  0439 21  1749 21  1749   SODIUM 127* 127* 127* 127* 125*   < > 125*   < > 124*   < > 125*   POTASSIUM 4.0 3.9 3.8 3.9 4.0   < > 3.9   < > 3.9   < > 4.4   CHLORIDE 90* 91* 91* 90* 90*   < > 86*   < > 85*   < > 84*   CO2 28.0 26.0 28.0 27.0 22.0   < > 20.0*   < > 25.0    < > 23.0   ANION GAP 9.0 10.0 8.0 10.0 13.0   < > 19.0*   < > 14.0   < > 18.0*   BUN 7 7 7 7 7   < > 10   < > 11   < > 13   CREATININE 0.39* 0.40* 0.39* 0.43* 0.36*   < > 0.35*   < > 0.44*   < > 0.49*   GLUCOSE 189* 188* 183* 211* 215*   < > 273*   < > 285*   < > 362*   CALCIUM 8.1* 8.3* 8.2* 8.5* 8.3*   < > 8.7   < > 8.7   < > 9.4   IONIZED CALCIUM  --   --   --   --   --   --   --   --   --   --  1.22   MAGNESIUM  --   --   --   --   --   --   --   --  1.8  --  1.9   PHOSPHORUS  --   --   --   --   --   --   --   --   --   --  3.7   HEMOGLOBIN A1C  --   --   --   --   --   --   --   --   --   --  7.80*   TSH  --   --   --   --   --   --  18.690*  --   --   --   --     < > = values in this interval not displayed.         Lab 08/19/21  1749   TOTAL PROTEIN 6.5   ALBUMIN 3.10*   GLOBULIN 3.4   ALT (SGPT) 22   AST (SGOT) 86*   BILIRUBIN 1.4*   ALK PHOS 330*                 Lab 08/19/21  1749   ABO TYPING O   RH TYPING Positive   ANTIBODY SCREEN Negative         Brief Urine Lab Results  (Last result in the past 365 days)      Color   Clarity   Blood   Leuk Est   Nitrite   Protein   CREAT   Urine HCG        08/20/21 0622 Yellow Clear Negative Trace Negative 30 mg/dL (1+)               Microbiology Results Abnormal     Procedure Component Value - Date/Time    Urine Culture - Urine, Urine, Catheter In/Out [[id number]]  (Normal) Collected: 08/19/21 2158    Lab Status: Final result Specimen: Urine, Catheter In/Out Updated: 08/20/21 2123     Urine Culture No growth    COVID PRE-OP / PRE-PROCEDURE SCREENING ORDER (NO ISOLATION) - Swab, Nasopharynx [[id number]]  (Normal) Collected: 08/19/21 2015    Lab Status: Final result Specimen: Swab from Nasopharynx Updated: 08/19/21 2034    Narrative:      The following orders were created for panel order COVID PRE-OP / PRE-PROCEDURE SCREENING ORDER (NO ISOLATION) - Swab, Nasopharynx.  Procedure                               Abnormality         Status                     ---------                                -----------         ------                     COVID-19, ABBOTT IN-HOUS...[[id number]]  Normal              Final result                 Please view results for these tests on the individual orders.    COVID-19, ABBOTT IN-HOUSE,NASAL Swab (NO TRANSPORT MEDIA) 2 HR TAT - Swab, Nasopharynx [[id number]]  (Normal) Collected: 08/19/21 2015    Lab Status: Final result Specimen: Swab from Nasopharynx Updated: 08/19/21 2034     COVID19 Presumptive Negative    Narrative:      Fact sheet for providers: https://www.fda.gov/media/113224/download     Fact sheet for patients: https://www.fda.gov/media/100451/download    Test performed by PCR.  If inconsistent with clinical signs and symptoms patient should be tested with different authorized molecular test.          FL Video Swallow With Speech Single Contrast    Result Date: 8/20/2021  EXAMINATION: FL VIDEO SWALLOW W SPEECH SINGLE-CONTRAST-, FL LIMITED UGI FOR MBS REFLUX SINGLE-CONTRAST-  INDICATION: dysphagia, known esophageal displacement per imaging  TECHNIQUE: 1 minute and 18 seconds of fluoroscopic time was used. 8 associated fluoroscopic loops were saved. The patient was evaluated in the seated lateral position while taking a variety of consistencies of barium by mouth under the direction of speech pathology.  COMPARISON: NONE  FINDINGS: 1 minute and 18 seconds of fluoroscopy provided for a modified barium swallow, and limited upper GI series. Please see speech therapy report for full details and recommendations. There is a moderate sized filling defect seen in the area of the oral pharynx. This filling defect persists throughout multiple images, but does not appear to impede swallowing of barium, however further evaluation may be considered if clinically relevant. No focal esophageal strictures were seen. Gastroesophageal reflux was not demonstrated during this exam.       Impression: Fluoroscopy provided for a modified barium swallow, and limited upper  GI series. Please see speech therapy report for full details and recommendations. Moderate sized filling defect seen in the area of the oropharynx, which does not appear to significantly impede swallowing of barium. Further evaluation may be considered if clinically relevant.    This report was finalized on 8/20/2021 4:31 PM by Dr. Jun Gonzales.      FL Limited Ugi For Mbs Reflux Single-Contrast    Result Date: 8/20/2021  EXAMINATION: FL VIDEO SWALLOW W SPEECH SINGLE-CONTRAST-, FL LIMITED UGI FOR MBS REFLUX SINGLE-CONTRAST-  INDICATION: dysphagia, known esophageal displacement per imaging  TECHNIQUE: 1 minute and 18 seconds of fluoroscopic time was used. 8 associated fluoroscopic loops were saved. The patient was evaluated in the seated lateral position while taking a variety of consistencies of barium by mouth under the direction of speech pathology.  COMPARISON: NONE  FINDINGS: 1 minute and 18 seconds of fluoroscopy provided for a modified barium swallow, and limited upper GI series. Please see speech therapy report for full details and recommendations. There is a moderate sized filling defect seen in the area of the oral pharynx. This filling defect persists throughout multiple images, but does not appear to impede swallowing of barium, however further evaluation may be considered if clinically relevant. No focal esophageal strictures were seen. Gastroesophageal reflux was not demonstrated during this exam.       Impression: Fluoroscopy provided for a modified barium swallow, and limited upper GI series. Please see speech therapy report for full details and recommendations. Moderate sized filling defect seen in the area of the oropharynx, which does not appear to significantly impede swallowing of barium. Further evaluation may be considered if clinically relevant.    This report was finalized on 8/20/2021 4:31 PM by Dr. Jun Gonzales.            I have reviewed the medications:  Scheduled Meds:busPIRone, 5 mg,  Oral, TID  castor oil-balsam peru, , Topical, Q12H  cefTRIAXone, 1 g, Intravenous, Q24H  dexamethasone, 4 mg, Intravenous, Q6H  docusate sodium, 200 mg, Oral, Daily  gabapentin, 300 mg, Oral, TID  insulin lispro, 0-14 Units, Subcutaneous, Q3H  levETIRAcetam, 1,000 mg, Intravenous, Q12H  levothyroxine, 250 mcg, Oral, Once per day on Mon Tue Wed Thu Fri Sat  megestrol, 80 mg, Oral, BID  Morphine, 30 mg, Oral, BID  nystatin, 5 mL, Swish & Swallow, 4x Daily  pantoprazole, 40 mg, Oral, BID AC  PARoxetine, 20 mg, Oral, Daily  polyethylene glycol, 17 g, Oral, Daily  senna, 2 tablet, Oral, Nightly  sodium chloride, 10 mL, Intravenous, Q12H      Continuous Infusions:sodium chloride, 75 mL/hr, Last Rate: 75 mL/hr (08/21/21 6459)      PRN Meds:.dextrose  •  dextrose  •  glucagon (human recombinant)  •  magnesium sulfate **OR** magnesium sulfate in D5W 1g/100mL (PREMIX) **OR** magnesium sulfate  •  Morphine  •  ondansetron **OR** ondansetron  •  oxyCODONE  •  sodium chloride    Assessment/Plan   Assessment & Plan     Active Hospital Problems    Diagnosis  POA   • **Endometrial cancer (CMS/HCC) [C54.1]  Yes   • Hyponatremia [E87.1]  Yes   • UTI (urinary tract infection), bacterial [N39.0, A49.9]  Yes   • Acute anemia [D64.9]  Yes   • Generalized weakness [R53.1]  Yes   • Endometrial cancer, FIGO stage IVB (CMS/HCC) [C54.1]  Yes   • Drug induced constipation [K59.03]  Yes   • Cancer related pain [G89.3]  Yes   • Dehydration [E86.0]  Yes   • Type 2 diabetes mellitus (CMS/HCC) [E11.9]  Yes      Resolved Hospital Problems   No resolved problems to display.        Brief Hospital Course to date:  Shannan Jha is a 51 y.o. female with hx of HTN, HLD, DM2, hypothyroidism, and recently diagnosed metastatic endometrial cancer in May 2021, undergoing treatment with Dr. Jensen with Paclitaxel/Carboplatin, who presents from Dr. Jensen's office due to worsening weakness, decreased oral intake, pain, and lab work showing worsening anemia.  "Admitted to hospitalist service. Further lab work revealed hyponatremia with Na 125 along with UTI. CT head/chest/abd/pelvis revealed new brain metastases as well as interval progression of metastatic disease throughout the chest, abdomen, and pelvis.    Metastatic endometrial cancer, rapidly progressive  Encephalopathy/confusion  Malignancy-related pain  --CT scans suggesting new brain mets, lung mets, adenopathy with mainstem bronchi and tracheal displacement, diffuse liver disease, and adenopathy displacing the inferior vena cava.  --Patient refused MRI brain.  --Continue empiric Keppra.  --Continue Decadron.  --Dr. Jensen consulted. Has discussed with patient and family. They would like to go home with hospice.  --Palliative Care following.  --Hospice met with family today, now family wants to just go home and call hospice when they are \"ready.\" I think we need to have another discussion tomorrow and work with case management to arrange for home equipment including hospital bed.    Anemia  --Likely related to chemotherapy.  --Transfused 2 units PRBC this admission.    Hyponatremia  --Likely secondary to volume depletion/poor oral intake.  --Gentle IV fluids.  --Improved.     UTI  --s/p Rocephin x 3 days.  --Urine culture negative.    DM2 with hyperglycemia  --HbA1c 7.8%.  --SSI.    Thrush  --Nystatin swish and swallow.    DVT prophylaxis:  Mechanical DVT prophylaxis orders are present.       AM-PAC 6 Clicks Score (PT): 15 (08/21/21 2000)    Disposition: I expect the patient to be discharged home ~1-2 days, possibly with hospice     CODE STATUS:   Code Status and Medical Interventions:   Ordered at: 08/20/21 0747     Limited Support to NOT Include:    Intubation    Cardioversion/Defibrillation     Code Status:    No CPR     Medical Interventions (Level of Support Prior to Arrest):    Limited       Trang Holbrook MD  08/22/21              "

## 2021-08-22 NOTE — PROGRESS NOTES
Continued Stay Note  [facility]     Patient Name: Shannan Jha  MRN: [id number]  Today's Date: 8/22/2021    Admit Date: 8/19/2021    Discharge Plan     Row Name 08/22/21 0921       Plan    Plan  Referral follow up    Plan Comments Visited Mrs. Jha and her , educated on home hospice services, they are wanting to get home first and then decided if they need our services they will call us when ready. I left our information, and spoke to [facility] staff nurse. Notified Dr. Holbrook about visit. I will check on pt again tomorrow.           Discharge Codes    No documentation.       Expected Discharge Date and Time     Expected Discharge Date Expected Discharge Time    Aug 24, 2021             Candance Disney, RN

## 2021-08-23 PROBLEM — N39.0 UTI (URINARY TRACT INFECTION), BACTERIAL: Status: RESOLVED | Noted: 2021-01-01 | Resolved: 2021-01-01

## 2021-08-23 PROBLEM — A49.9 UTI (URINARY TRACT INFECTION), BACTERIAL: Status: RESOLVED | Noted: 2021-01-01 | Resolved: 2021-01-01

## 2021-08-23 NOTE — PLAN OF CARE
Problem: Palliative Care  Goal: Enhanced Quality of Life  Intervention: Optimize Psychosocial Wellbeing  Recent Flowsheet Documentation  Taken 8/23/2021 1235 by Nazanin Foy, MSW  Supportive Measures: (symptom assessment)   active listening utilized   positive reinforcement provided   verbalization of feelings encouraged   other (see comments)  Family/Support System Care:   support provided   self-care encouraged  Visit with patient and spouse at bedside following discussion with Dr. Jensen and Hospice CM.  Patient feeling weaker today, worried about going home and how her spouse will care for her.  Discussion by Hospice CM regarding possible inpatient hospice - currently patient symptoms are controlled on po medications and patient is not eligible for inpt hospice admission.  Patient and spouse considering home with hospice but have not decided - Hospice CM to follow up in the morning.  Patient to get blood transfusion today - would recommend therapy visit to discuss safe transfer/care techniques at home.    1300 Palliative IDT-Palliative team members present:  VERN Kwok DO; VAZQUEZ Valerio APRN, DNP; PIA Limon RN, CHPN; KRISTA Foy [facility], [facility]-; JOSEILN Swenson RN, OCN, SABAS; MIL Cisneros MDiv, [facility]

## 2021-08-23 NOTE — PROGRESS NOTES
"     Gynecologic Oncology In-Patient Progress Note      Patient Name: Shannan Jha  : 1970   MRN: [id number]       Subjective   Patient not doing well today. Reports that she got up to use bedside commode and had significant weakness and dizziness. States that she is getting a unit of blood. Pain controlled on MS contin while in better but had \"terrible\" pain when trying to ambulate to bedside commode. She has basically been confined to the bed. Continues to have worsening issues with balance and dizziness. She is not stable on her feet. She is very overwhelmed about the prospect of discharge and doesn't think she will do well at home.    Review of Systems:   Review of Systems   Constitutional: Positive for activity change, appetite change and fatigue.   HENT: Positive for trouble swallowing.    Eyes: Positive for visual disturbance.   Respiratory: Positive for choking. Negative for shortness of breath.    Cardiovascular: Negative for chest pain and palpitations.   Gastrointestinal: Negative for abdominal pain.   Genitourinary: Positive for vaginal discharge.   Musculoskeletal: Negative for arthralgias and myalgias.   Neurological: Positive for dizziness, weakness and light-headedness.   Hematological: Positive for adenopathy.        Objective     Physical Exam:  Vital Signs:   Vitals:    21 2145 21 0344 21 0849 21 1123   BP: 101/62 111/63  103/67   BP Location: Right arm Right arm  Left arm   Patient Position: Lying Lying  Lying   Pulse: 84 84 87    Resp: 16 14 18    Temp: 98.4 °F (36.9 °C) 98.3 °F (36.8 °C) 97.9 °F (36.6 °C)    TempSrc: Oral Oral Oral    SpO2: 96% 98% 99%    Weight:       Height:         BMI: Body mass index is 29.82 kg/m².   Weight:   Wt Readings from Last 3 Encounters:   21 91.6 kg (202 lb)   21 91.6 kg (202 lb)   21 91.6 kg (202 lb)     I&O: I/O last 3 completed shifts:  In: 3124.4 [P.O.:360; I.V.:2663.4; IV Piggyback:101]  Out: -     Physical " Exam  Vitals and nursing note reviewed.   Constitutional:       Appearance: She is ill-appearing. She is not diaphoretic.   HENT:      Head: Normocephalic and atraumatic.      Nose: Nose normal.      Comments: Nasal cannula  Eyes:      General: No scleral icterus.        Right eye: No discharge.         Left eye: No discharge.   Cardiovascular:      Rate and Rhythm: Normal rate and regular rhythm.   Pulmonary:      Effort: Pulmonary effort is normal. No respiratory distress.   Genitourinary:     Comments: Deferred  Skin:     Comments: Flushing noted on right cheek   Neurological:      Mental Status: She is disoriented.      Comments: Occasional garbled speech         Inpatient Medications:   Scheduled Meds:busPIRone, 5 mg, Oral, TID  castor oil-balsam peru, , Topical, Q12H  cefTRIAXone, 1 g, Intravenous, Q24H  dexamethasone, 4 mg, Intravenous, Q6H  docusate sodium, 200 mg, Oral, Daily  gabapentin, 300 mg, Oral, TID  insulin lispro, 0-14 Units, Subcutaneous, 4x Daily With Meals & Nightly  levETIRAcetam, 1,000 mg, Intravenous, Q12H  levothyroxine, 250 mcg, Oral, Once per day on Mon Tue Wed Thu Fri Sat  megestrol, 80 mg, Oral, BID  Morphine, 30 mg, Oral, BID  nystatin, 5 mL, Swish & Swallow, 4x Daily  pantoprazole, 40 mg, Oral, BID AC  PARoxetine, 20 mg, Oral, Daily  polyethylene glycol, 17 g, Oral, Daily  senna, 2 tablet, Oral, Nightly  sodium chloride, 10 mL, Intravenous, Q12H      Continuous Infusions:sodium chloride, 75 mL/hr, Last Rate: 75 mL/hr (08/23/21 0541)      PRN Meds:.dextrose  •  dextrose  •  glucagon (human recombinant)  •  magnesium sulfate **OR** magnesium sulfate in D5W 1g/100mL (PREMIX) **OR** magnesium sulfate  •  Morphine  •  ondansetron **OR** ondansetron  •  oxyCODONE  •  sodium chloride      Results:   Lab Results   Component Value Date    WBC 5.18 08/23/2021    HGB 6.7 (C) 08/23/2021    HCT 20.4 (C) 08/23/2021    MCV 85.4 08/23/2021     (L) 08/23/2021       Lab Results   Component Value  Date    GLUCOSE 178 (H) 08/23/2021    CALCIUM 8.4 (L) 08/23/2021     (L) 08/23/2021    K 3.9 08/23/2021    CO2 27.0 08/23/2021    CL 91 (L) 08/23/2021    BUN 7 08/23/2021    CREATININE 0.43 (L) 08/23/2021    EGFRIFNONA >150 08/23/2021    BCR 16.3 08/23/2021    ANIONGAP 8.0 08/23/2021        Assessment / Plan    This is a 51 y.o. female with rapidly progressive dedifferentiated uterine cancer. The patient reports that the plan was to give her blood and discharge her home to possibly set up home hospice once she is situated.  I have significant concerns about the patient's ability to function at home particularly without home hospice already being set up.  She reports terrible pain with ambulation today.  She continues to need significant assistance due to her dizziness and balance issues.  The patient is getting 1 unit of blood.  However, as I discussed with Shabnam from [facility] the patient has had approximately 10 units of blood in the past 2 months we never got above hemoglobin of 8.5.  At this point I think patient really has 2 options -set up home hospice prior to discharge versus inpatient hospice.  I do think that the patient will continue to rapidly decline.  I also have concerns about Stephen's ability to manage the complex care that Shannan will need over the upcoming days to weeks.  I did discuss with Shannan as did Shabnam that potentially if she is able to be optimized in an inpatient hospice setting that she could potentially be discharged home with home hospice.  Stephen and Shannan feeling they would be more comfortable with inpatient hospice.  Shabnam will contact [facility] to see if they believe that Shannan would be a candidate for inpatient hospice. I will continue to follow.    Dispo: Inpatient versus home hospice    MIL Jensen MD  Gynecologic Oncology   Date: 08/23/21  Time: 13:15 EDT    Please note that portions of this note may have been completed with a voice recognition  program.

## 2021-08-23 NOTE — PLAN OF CARE
VSS,  3L NC , NSR. UOP adequate. Prn and Scheduled medication given. Pain and Nausea was managed with meds. Patient c/o weakness when going to the bedside commode. 1 unit of PRBCs given as ordered.  Will continue to monitor.

## 2021-08-23 NOTE — CASE MANAGEMENT/SOCIAL WORK
Continued Stay Note  [facility]     Patient Name: Shannan Jha  MRN: [id number]  Today's Date: 8/23/2021    Admit Date: 8/19/2021    Discharge Plan     Row Name 08/23/21 1211       Plan    Plan  discharge plan    Plan Comments  CM met with pt and pt's spouse at bedside.  Plan is home with spouse. Pt is interested in BSC and rolling walker per PT's recommendations and has no preference to DME company. Referral made to [facility] for BSC and RW and per Mihai, the DME will be delivered to the room prior to discharge. Pt/spouse denies further discharge needs.    Final Discharge Disposition Code  01 - home or self-care        Discharge Codes    No documentation.       Expected Discharge Date and Time     Expected Discharge Date Expected Discharge Time    Aug 23, 2021             Maxine cMcoy RN

## 2021-08-23 NOTE — PROGRESS NOTES
Patient was extremely weak when using the bedside commode today. Discharge will be canceled today. She does not currently meet inpatient hospice criteria, and thus plan will be to arrange home with hospice tomorrow. Hospice liaison states ambulance has been arranged for 1600 tomorrow. Will defer any further concerns/questions about hospice care to the Hospice team.     Electronically signed by Trang Holbrook MD, 08/23/21, 2:44 PM EDT.

## 2021-08-23 NOTE — H&P
[facility] Medicine Services  DISCHARGE SUMMARY    Patient Name: Shannan Jha  : 1970  MRN: [id number]    Date of Admission: 2021  3:29 PM  Date of Discharge:  21  Primary Care Physician: Maricel Quinteros MD    Consults     Date and Time Order Name Status Description    2021 12:34 AM Inpatient Gynecologic Oncology Consult      2021  5:09 PM Inpatient Palliative Care MD Consult Completed           Hospital Course     Presenting Problem:   Endometrial cancer, FIGO stage IVB (CMS/HCC) [C54.1]    Active Hospital Problems    Diagnosis  POA   • **Endometrial cancer (CMS/HCC) [C54.1]  Yes   • Severe malnutrition (CMS/HCC) [E43]  Yes   • Hyponatremia [E87.1]  Yes   • Acute anemia [D64.9]  Yes   • Generalized weakness [R53.1]  Yes   • Endometrial cancer, FIGO stage IVB (CMS/HCC) [C54.1]  Yes   • Drug induced constipation [K59.03]  Yes   • Cancer related pain [G89.3]  Yes   • Dehydration [E86.0]  Yes   • Type 2 diabetes mellitus (CMS/HCC) [E11.9]  Yes      Resolved Hospital Problems    Diagnosis Date Resolved POA   • UTI (urinary tract infection), bacterial [N39.0, A49.9] 2021 Yes          Hospital Course:  Shannan Jha is a 51 y.o. female with hx of HTN, HLD, DM2, hypothyroidism, and recently diagnosed metastatic endometrial cancer in May 2021, undergoing treatment with Dr. Jensen with Paclitaxel/Carboplatin, who presents from Dr. Jensen's office due to worsening weakness, decreased oral intake, pain, and lab work showing worsening anemia. Admitted to hospitalist service. Further lab work revealed hyponatremia with Na 125 along with UTI. CT head/chest/abd/pelvis revealed new brain metastases as well as interval progression of metastatic disease throughout the chest, abdomen, and pelvis. After further discussion with Dr. Jensen, patient and family have decided to go home and forego further treatment. They have been seen by Hospice.     Metastatic endometrial  "cancer, rapidly progressive  Encephalopathy/confusion  Malignancy-related pain  --CT scans suggesting new brain mets, lung mets, adenopathy with mainstem bronchi and tracheal displacement, diffuse liver disease, and adenopathy displacing the inferior vena cava.  --Patient refused MRI brain.  --Continue empiric Keppra.  --Continue Decadron.  --Dr. Jensen consulted. Has discussed with patient and family. They would like to go home with hospice.  --Hospice met with family 8/22/21, now family wants to just go home and call hospice when they are \"ready.\"      Anemia  --Likely related to chemotherapy.  --Transfused 2 units PRBC this admission.  --Anemia worsening again today. Discussed risks and benefits of blood transfusion especially in end stage cancer, and patient agrees to no further blood transfusions.      Hyponatremia  --Likely secondary to volume depletion/poor oral intake.  --Gentle IV fluids.  --Stable.     UTI  --s/p Rocephin x 3 days.  --Urine culture negative.     DM2 with hyperglycemia  --HbA1c 7.8%.     Thrush  --Nystatin swish and swallow.     Discharge patient home today.  will arrange walker and bedside commode. They do not feel they need a hospital bed at this time. Plan is home today, and likely enroll in home hospice in the very near future. Will send home on Decadron and Keppra for the time being. Will also give 3 days of PRN Percocet as it appears she may be out of this medication. She can call Dr. Jensen or Hospice for future refills.     Discharge Follow Up Recommendations for outpatient labs/diagnostics:  F/U with Dr. Jensen and Hospice as needed    Day of Discharge     HPI:   Patient seen this morning. No complaints. Rested well. Eager for discharge home.     Review of Systems  Gen-no fevers, no chills  CV-no chest pain, no palpitations  Resp-no cough, no dyspnea  GI-no N/V/D, no abd pain    All other systems reviewed and negative except any additional pertinent positives and " negatives as discussed in HPI.      Vital Signs:   Temp:  [97.2 °F (36.2 °C)-98.4 °F (36.9 °C)] 97.9 °F (36.6 °C)  Heart Rate:  [82-87] 87  Resp:  [14-18] 18  BP: (101-111)/(62-63) 111/63     Physical Exam:  Gen-no acute distress, pale appearance  HENT-NCAT, mucous membranes moist  Resp-nonlabored on 3 liters  Abd-soft, NT, ND, +BS  Neuro-A&Ox3, no focal deficits  Skin-no rashes  Psych-appropriate mood      Pertinent  and/or Most Recent Results     LAB RESULTS:      Lab 08/23/21  0402 08/20/21 0439 08/19/21 1953 08/19/21 1749 08/19/21  1418   WBC 5.18 3.72  --  4.28 4.70   HEMOGLOBIN 6.7* 7.3*  --  6.4* 6.4*   HEMATOCRIT 20.4* 22.2*  --  18.9* 18.9*   PLATELETS 108* 100*  --  135* 177   NEUTROS ABS  --   --   --  3.03 3.80   IMMATURE GRANS (ABS)  --   --   --  0.43*  --    LYMPHS ABS  --   --   --  0.41* 0.60*   MONOS ABS  --   --   --  0.35 0.30   EOS ABS  --   --   --  0.05  --    MCV 85.4 84.7  --  78.1* 80.7   LACTATE  --   --  2.0  --   --          Lab 08/23/21  0402 08/22/21 0319 08/22/21  0028 08/21/21 1957 08/21/21  1546 08/20/21  0824 08/20/21  0439 08/19/21 2202 08/19/21 2202 08/19/21 1749 08/19/21 1749   SODIUM 126* 127* 127* 127* 127*   < > 125*   < > 124*   < > 125*   POTASSIUM 3.9 4.0 3.9 3.8 3.9   < > 3.9   < > 3.9   < > 4.4   CHLORIDE 91* 90* 91* 91* 90*   < > 86*   < > 85*   < > 84*   CO2 27.0 28.0 26.0 28.0 27.0   < > 20.0*   < > 25.0   < > 23.0   ANION GAP 8.0 9.0 10.0 8.0 10.0   < > 19.0*   < > 14.0   < > 18.0*   BUN 7 7 7 7 7   < > 10   < > 11   < > 13   CREATININE 0.43* 0.39* 0.40* 0.39* 0.43*   < > 0.35*   < > 0.44*   < > 0.49*   GLUCOSE 178* 189* 188* 183* 211*   < > 273*   < > 285*   < > 362*   CALCIUM 8.4* 8.1* 8.3* 8.2* 8.5*   < > 8.7   < > 8.7   < > 9.4   IONIZED CALCIUM  --   --   --   --   --   --   --   --   --   --  1.22   MAGNESIUM  --   --   --   --   --   --   --   --  1.8  --  1.9   PHOSPHORUS  --   --   --   --   --   --   --   --   --   --  3.7   HEMOGLOBIN A1C  --   --    --   --   --   --   --   --   --   --  7.80*   TSH  --   --   --   --   --   --  18.690*  --   --   --   --     < > = values in this interval not displayed.         Lab 08/19/21  1749   TOTAL PROTEIN 6.5   ALBUMIN 3.10*   GLOBULIN 3.4   ALT (SGPT) 22   AST (SGOT) 86*   BILIRUBIN 1.4*   ALK PHOS 330*                 Lab 08/23/21  0654 08/19/21  1749 08/19/21  1749   ABO TYPING O   < > O   RH TYPING Positive   < > Positive   ANTIBODY SCREEN Negative  --  Negative    < > = values in this interval not displayed.         Brief Urine Lab Results  (Last result in the past 365 days)      Color   Clarity   Blood   Leuk Est   Nitrite   Protein   CREAT   Urine HCG        08/20/21 0622 Yellow Clear Negative Trace Negative 30 mg/dL (1+)             Microbiology Results (last 10 days)     Procedure Component Value - Date/Time    Urine Culture - Urine, Urine, Catheter In/Out [[id number]]  (Normal) Collected: 08/19/21 2158    Lab Status: Final result Specimen: Urine, Catheter In/Out Updated: 08/20/21 2123     Urine Culture No growth    COVID PRE-OP / PRE-PROCEDURE SCREENING ORDER (NO ISOLATION) - Swab, Nasopharynx [[id number]]  (Normal) Collected: 08/19/21 2015    Lab Status: Final result Specimen: Swab from Nasopharynx Updated: 08/19/21 2034    Narrative:      The following orders were created for panel order COVID PRE-OP / PRE-PROCEDURE SCREENING ORDER (NO ISOLATION) - Swab, Nasopharynx.  Procedure                               Abnormality         Status                     ---------                               -----------         ------                     COVID-19, ABBOTT IN-HOUS...[[id number]]  Normal              Final result                 Please view results for these tests on the individual orders.    COVID-19, ABBOTT IN-HOUSE,NASAL Swab (NO TRANSPORT MEDIA) 2 HR TAT - Swab, Nasopharynx [[id number]]  (Normal) Collected: 08/19/21 2015    Lab Status: Final result Specimen: Swab from Nasopharynx Updated: 08/19/21 2034      COVID19 Presumptive Negative    Narrative:      Fact sheet for providers: https://www.fda.gov/media/158930/download     Fact sheet for patients: https://www.fda.gov/media/844787/download    Test performed by PCR.  If inconsistent with clinical signs and symptoms patient should be tested with different authorized molecular test.          CT Head With & Without Contrast    Result Date: 8/19/2021  HISTORY: Mental status change, metastatic cancer TECHNIQUE: Axial head CT with and without IV contrast. Radiation dose reduction techniques included automated exposure control or exposure modulation based on body size. Count of known CT and cardiac nuc med studies performed in previous 12 months: 0. COMPARISON: None. FINDINGS: Prominently dural based lesion is seen abutting the left posterior cerebellar hemisphere measuring approximately 2 x 1.1 cm that demonstrates enhancement. There also appears to be some potential dural thickening at the posterior apex abutting the posterior left frontal lobe, although this is somewhat equivocal. There is no definite large intraparenchymal lesion although subtle smaller lesions cannot be entirely excluded. There is no definite osseous abnormality. The sphenoid sinuses are opacified.     Overall, the findings are concerning for potential dural based metastases. The largest abuts the posterior left cerebellum and there is an equivocal second lesion at the left posterior vertex. In addition, small intraparenchymal metastases are not excluded. Consider follow-up MRI with contrast. Signer Name: Martina Malin MD  Signed: 8/19/2021 9:21 PM  Workstation Name: [id number]  [facility]    CT Abdomen Pelvis With Contrast    Result Date: 8/19/2021  CT Abdomen Pelvis W, CTA Chest INDICATION: Mental status change, endometrial carcinoma, assess for progression TECHNIQUE: CTA of the chest. CT of the abdomen and pelvis without IV contrast. Coronal and sagittal reconstructions were  obtained.  Radiation dose reduction techniques included automated exposure control or exposure modulation based on body size. Count of known CT and cardiac nuc med studies performed in previous 12 months: 0. COMPARISON: CT of the abdomen from 7/2/21 FINDINGS: CTA chest: There is no definite evidence of pulmonary embolism although there is somewhat limitation of evaluation for small subsegmental embolus. The patient's left supraclavicular lymphadenopathy and left low cervical lymphadenopathy has significantly increased in size and there is shift of the trachea to the right. Multilevel mediastinal nodes have significantly increased in size and there is significant narrowing of the eleonora and mainstem bronchi bilaterally. The esophageal lumen is also compressed and may be significantly involved. There are new small bilateral pleural effusions, greater on the right as well as multiple new lung metastases noted bilaterally. Bone marrow attenuation is somewhat mottled and underlying metastatic disease cannot be entirely excluded although no definite focal lesion is identified. Abdomen: Multiple new metastatic lesions are noted throughout the liver. Lesions are noted within the bilateral adrenal glands, and the spleen is not well evaluated due to beam hardening artifact. However, there is suspicion for multiple splenic lesions as well. Multiple large jeana masses are seen scattered throughout the abdomen. There is also flattening of the inferior vena cava secondary to jeana mass. Pelvis: Malignant lesion abuts the wall of the rectum anteriorly. There is a small amount of fluid in the pelvis. Multiple abnormal aortocaval and periaortic nodes are noted. No acute bowel abnormality is appreciated. No acute osseous abnormality.     1. There has been gross interval progression of metastatic disease as described above throughout the chest, abdomen and pelvis. 2. There is no definite evidence of pulmonary embolism although there  is somewhat limitation of evaluation for small subsegmental embolus. Signer Name: Martina Malin MD  Signed: 8/19/2021 9:31 PM  Workstation Name: [id number]  [facility]    FL Video Swallow With Speech Single Contrast    Result Date: 8/20/2021  EXAMINATION: FL VIDEO SWALLOW W SPEECH SINGLE-CONTRAST-, FL LIMITED UGI FOR MBS REFLUX SINGLE-CONTRAST-  INDICATION: dysphagia, known esophageal displacement per imaging  TECHNIQUE: 1 minute and 18 seconds of fluoroscopic time was used. 8 associated fluoroscopic loops were saved. The patient was evaluated in the seated lateral position while taking a variety of consistencies of barium by mouth under the direction of speech pathology.  COMPARISON: NONE  FINDINGS: 1 minute and 18 seconds of fluoroscopy provided for a modified barium swallow, and limited upper GI series. Please see speech therapy report for full details and recommendations. There is a moderate sized filling defect seen in the area of the oral pharynx. This filling defect persists throughout multiple images, but does not appear to impede swallowing of barium, however further evaluation may be considered if clinically relevant. No focal esophageal strictures were seen. Gastroesophageal reflux was not demonstrated during this exam.       Fluoroscopy provided for a modified barium swallow, and limited upper GI series. Please see speech therapy report for full details and recommendations. Moderate sized filling defect seen in the area of the oropharynx, which does not appear to significantly impede swallowing of barium. Further evaluation may be considered if clinically relevant.    This report was finalized on 8/20/2021 4:31 PM by Dr. Jun Gonzales.      FL Limited Ugi For Mbs Reflux Single-Contrast    Result Date: 8/20/2021  EXAMINATION: FL VIDEO SWALLOW W SPEECH SINGLE-CONTRAST-, FL LIMITED UGI FOR MBS REFLUX SINGLE-CONTRAST-  INDICATION: dysphagia, known esophageal displacement per imaging   TECHNIQUE: 1 minute and 18 seconds of fluoroscopic time was used. 8 associated fluoroscopic loops were saved. The patient was evaluated in the seated lateral position while taking a variety of consistencies of barium by mouth under the direction of speech pathology.  COMPARISON: NONE  FINDINGS: 1 minute and 18 seconds of fluoroscopy provided for a modified barium swallow, and limited upper GI series. Please see speech therapy report for full details and recommendations. There is a moderate sized filling defect seen in the area of the oral pharynx. This filling defect persists throughout multiple images, but does not appear to impede swallowing of barium, however further evaluation may be considered if clinically relevant. No focal esophageal strictures were seen. Gastroesophageal reflux was not demonstrated during this exam.       Fluoroscopy provided for a modified barium swallow, and limited upper GI series. Please see speech therapy report for full details and recommendations. Moderate sized filling defect seen in the area of the oropharynx, which does not appear to significantly impede swallowing of barium. Further evaluation may be considered if clinically relevant.    This report was finalized on 8/20/2021 4:31 PM by Dr. Jun Gonzales.      CT Angiogram Chest    Result Date: 8/19/2021  CT Abdomen Pelvis W, CTA Chest INDICATION: Mental status change, endometrial carcinoma, assess for progression TECHNIQUE: CTA of the chest. CT of the abdomen and pelvis without IV contrast. Coronal and sagittal reconstructions were obtained.  Radiation dose reduction techniques included automated exposure control or exposure modulation based on body size. Count of known CT and cardiac nuc med studies performed in previous 12 months: 0. COMPARISON: CT of the abdomen from 7/2/21 FINDINGS: CTA chest: There is no definite evidence of pulmonary embolism although there is somewhat limitation of evaluation for small subsegmental  embolus. The patient's left supraclavicular lymphadenopathy and left low cervical lymphadenopathy has significantly increased in size and there is shift of the trachea to the right. Multilevel mediastinal nodes have significantly increased in size and there is significant narrowing of the eleonora and mainstem bronchi bilaterally. The esophageal lumen is also compressed and may be significantly involved. There are new small bilateral pleural effusions, greater on the right as well as multiple new lung metastases noted bilaterally. Bone marrow attenuation is somewhat mottled and underlying metastatic disease cannot be entirely excluded although no definite focal lesion is identified. Abdomen: Multiple new metastatic lesions are noted throughout the liver. Lesions are noted within the bilateral adrenal glands, and the spleen is not well evaluated due to beam hardening artifact. However, there is suspicion for multiple splenic lesions as well. Multiple large jeana masses are seen scattered throughout the abdomen. There is also flattening of the inferior vena cava secondary to jeana mass. Pelvis: Malignant lesion abuts the wall of the rectum anteriorly. There is a small amount of fluid in the pelvis. Multiple abnormal aortocaval and periaortic nodes are noted. No acute bowel abnormality is appreciated. No acute osseous abnormality.     1. There has been gross interval progression of metastatic disease as described above throughout the chest, abdomen and pelvis. 2. There is no definite evidence of pulmonary embolism although there is somewhat limitation of evaluation for small subsegmental embolus. Signer Name: Martina Malin MD  Signed: 8/19/2021 9:31 PM  Workstation Name: [id number]  [facility]                Discharge Details        Discharge Medications      New Medications      Instructions Start Date   levETIRAcetam 1000 MG tablet  Commonly known as: Keppra   1,000 mg, Oral, 2 Times Daily          Changes to Medications      Instructions Start Date   dexamethasone 4 MG tablet  Commonly known as: DECADRON  What changed:   · how much to take  · how to take this  · when to take this  · additional instructions   4 mg, Oral, 2 Times Daily With Meals      oxyCODONE-acetaminophen  MG per tablet  Commonly known as: Percocet  What changed:   · how much to take  · reasons to take this   1 tablet, Oral, Every 6 Hours PRN         Continue These Medications      Instructions Start Date   acetaminophen 325 MG tablet  Commonly known as: Tylenol   650 mg, Oral, Every 6 Hours PRN      Black Cohosh 40 MG capsule   2 tablets, Oral, Daily      busPIRone 10 MG tablet  Commonly known as: BUSPAR   10 mg, Oral, 3 Times Daily      docusate sodium 100 MG capsule  Commonly known as: COLACE   100 mg, Oral, 2 Times Daily      Farxiga 10 MG tablet  Generic drug: Dapagliflozin Propanediol   10 mg, Oral, Daily      gabapentin 300 MG capsule  Commonly known as: NEURONTIN   600 mg, Oral, 3 Times Daily      glimepiride 2 MG tablet  Commonly known as: AMARYL   2 mg, Oral, 2 times daily      HumuLIN R U-500 KwikPen 500 UNIT/ML solution pen-injector CONCENTRATED injection  Generic drug: Insulin Regular Human (Conc)   Subcutaneous, 3 Times Daily Before Meals, 180 units before breakfast, 190 units before lunch and supper      ibuprofen 800 MG tablet  Commonly known as: ADVIL,MOTRIN   800 mg, Oral, Every 8 Hours PRN      levothyroxine 125 MCG tablet  Commonly known as: SYNTHROID, LEVOTHROID   250 mcg, Oral, Take As Directed, Take 2 tablets six days a week      lisinopril 20 MG tablet  Commonly known as: PRINIVIL,ZESTRIL   20 mg, Oral, Daily      loratadine 10 MG tablet  Commonly known as: Claritin   Take 1 tab the night before chemo then take 1 tab the morning of chemo.      megestrol 40 MG tablet  Commonly known as: MEGACE   80 mg, Oral, 2 Times Daily      metFORMIN  MG 24 hr tablet  Commonly known as: GLUCOPHAGE-XR   2,000 mg, Oral,  Nightly      mirtazapine 15 MG tablet  Commonly known as: REMERON   15 mg, Oral, Nightly      Morphine 30 MG 12 hr tablet  Commonly known as: MS Contin   30 mg, Oral, 2 Times Daily, 1 tablet every 12 hours      multivitamin tablet tablet   1 tablet, Oral, Daily      OMEGA-3 FATTY ACIDS PO   2 tablets, Oral, 2 times daily      ondansetron 8 MG tablet  Commonly known as: ZOFRAN   8 mg, Oral, 3 Times Daily PRN      oxyCODONE 10 MG tablet  Commonly known as: ROXICODONE   Take 1-2 tablets by mouth Every 6 (Six) Hours As Needed for pain      PARoxetine 40 MG tablet  Commonly known as: PAXIL   40 mg, Oral, Daily      polyethylene glycol 17 GM/SCOOP powder  Commonly known as: MIRALAX   17 g, Oral, Daily, As needed for constipation if no bowel movement in 2 days      PrilOSEC 20 MG capsule  Generic drug: omeprazole   20 mg, Oral, Daily      prochlorperazine 10 MG tablet  Commonly known as: COMPAZINE   10 mg, Oral, Every 4 Hours PRN      rosuvastatin 40 MG tablet  Commonly known as: CRESTOR   40 mg, Oral, Every Evening      Senna 8.6 MG capsule   1 capsule, Oral, Daily             Allergies   Allergen Reactions   • Codeine Nausea Only and Dizziness         Discharge Disposition:  Home or Self Care    Diet:  Hospital:  Diet Order   Procedures   • Diet Soft Texture; Whole Foods; Consistent Carbohydrate       Activity:  Activity Instructions     Activity as Tolerated              CODE STATUS:    Code Status and Medical Interventions:   Ordered at: 08/20/21 0747     Limited Support to NOT Include:    Intubation    Cardioversion/Defibrillation     Code Status:    No CPR     Medical Interventions (Level of Support Prior to Arrest):    Limited       Future Appointments   Date Time Provider Department Center   9/21/2021 10:30 AM Eli Baker APRN [facility] None             Trang Holbrook MD  08/23/21      Time Spent on Discharge:  I spent  32  minutes on this discharge activity which included: face-to-face encounter with  the patient, reviewing the data in the system, coordination of the care with the nursing staff as well as consultants, documentation, and entering orders.

## 2021-08-23 NOTE — DISCHARGE SUMMARY
[facility] Medicine Services  DISCHARGE SUMMARY    Patient Name: Shannan Jha  : 1970  MRN: [id number]    Date of Admission: 2021  3:29 PM  Date of Discharge:  21  Primary Care Physician: Maricel Quinteros MD    Consults     Date and Time Order Name Status Description    2021 12:34 AM Inpatient Gynecologic Oncology Consult      2021  5:09 PM Inpatient Palliative Care MD Consult Completed           Hospital Course     Presenting Problem:   Endometrial cancer, FIGO stage IVB (CMS/HCC) [C54.1]    Active Hospital Problems    Diagnosis  POA   • **Endometrial cancer (CMS/HCC) [C54.1]  Yes   • Severe malnutrition (CMS/HCC) [E43]  Yes   • Hyponatremia [E87.1]  Yes   • Acute anemia [D64.9]  Yes   • Generalized weakness [R53.1]  Yes   • Endometrial cancer, FIGO stage IVB (CMS/HCC) [C54.1]  Yes   • Drug induced constipation [K59.03]  Yes   • Cancer related pain [G89.3]  Yes   • Dehydration [E86.0]  Yes   • Type 2 diabetes mellitus (CMS/HCC) [E11.9]  Yes      Resolved Hospital Problems    Diagnosis Date Resolved POA   • UTI (urinary tract infection), bacterial [N39.0, A49.9] 2021 Yes          Hospital Course:  Shannan Jha is a 51 y.o. female with hx of HTN, HLD, DM2, hypothyroidism, and recently diagnosed metastatic endometrial cancer in May 2021, undergoing treatment with Dr. Jensen with Paclitaxel/Carboplatin, who presents from Dr. Jensen's office due to worsening weakness, decreased oral intake, pain, and lab work showing worsening anemia. Admitted to hospitalist service. Further lab work revealed hyponatremia with Na 125 along with UTI. CT head/chest/abd/pelvis revealed new brain metastases as well as interval progression of metastatic disease throughout the chest, abdomen, and pelvis. After further discussion with Dr. Jensen, patient and family have decided to go home and forego further treatment. They have been seen by Hospice.     Metastatic endometrial  cancer, rapidly progressive  Encephalopathy/confusion  Malignancy-related pain  --CT scans suggesting new brain mets, lung mets, adenopathy with mainstem bronchi and tracheal displacement, diffuse liver disease, and adenopathy displacing the inferior vena cava.  --Patient refused MRI brain.  --Continue empiric Keppra.  --Continue Decadron.  --Dr. Jensen consulted. Has discussed with patient and family. They would like to go home with hospice.  --Hospice met with family 8/22/21. Plan is home with hospice today via ambulance.     Anemia  --Likely related to chemotherapy.  --Transfused 2 units PRBC this admission.  --Anemia worsening again today. Discussed risks and benefits of blood transfusion especially in end stage cancer, and patient agreed to no further blood transfusions--HOWEVER later on nurse paged me and stated patient felt weak and was requesting a blood transfusion prior to discharge. Transfused another 1 unit PRBC on 8/23/21.     Hyponatremia  --Likely secondary to volume depletion/poor oral intake.  --Gentle IV fluids.  --Stable.     UTI  --s/p Rocephin x 3 days.  --Urine culture negative.     DM2 with hyperglycemia  --HbA1c 7.8%.     Thrush  --Nystatin swish and swallow.     Discharge patient home with hospice today (remained in hospital one more day for blood transfusion and further discussion with hospice). Sending home on Decadron and Keppra for the time being, defer to Hospice regarding when to stop these meds. Will also give 3 days of PRN Percocet as it appears she may be out of this medication. She can call Hospice for future refills.      Discharge Follow Up Recommendations for outpatient labs/diagnostics:  F/U with Dr. Jensen and Hospice as needed    Day of Discharge     HPI:   Patient seen this morning. No complaints. Rested well. Eager for discharge home.     Review of Systems  Gen-no fevers, no chills  CV-no chest pain, no palpitations  Resp-no cough, no dyspnea  GI-no N/V/D, no abd  pain     All other systems reviewed and negative except any additional pertinent positives and negatives as discussed in HPI.    Vital Signs:   Temp:  [97.2 °F (36.2 °C)-98.4 °F (36.9 °C)] 97.9 °F (36.6 °C)  Heart Rate:  [82-87] 87  Resp:  [14-18] 18  BP: (101-111)/(62-63) 111/63     Physical Exam:  Gen-no acute distress, pale appearance  HENT-NCAT, mucous membranes moist  Resp-nonlabored on 3 liters  Abd-soft, NT, ND, +BS  Neuro-A&Ox3, no focal deficits  Skin-no rashes  Psych-appropriate mood    Pertinent  and/or Most Recent Results     LAB RESULTS:      Lab 08/23/21 0402 08/20/21 0439 08/19/21 1953 08/19/21 1749 08/19/21  1418   WBC 5.18 3.72  --  4.28 4.70   HEMOGLOBIN 6.7* 7.3*  --  6.4* 6.4*   HEMATOCRIT 20.4* 22.2*  --  18.9* 18.9*   PLATELETS 108* 100*  --  135* 177   NEUTROS ABS  --   --   --  3.03 3.80   IMMATURE GRANS (ABS)  --   --   --  0.43*  --    LYMPHS ABS  --   --   --  0.41* 0.60*   MONOS ABS  --   --   --  0.35 0.30   EOS ABS  --   --   --  0.05  --    MCV 85.4 84.7  --  78.1* 80.7   LACTATE  --   --  2.0  --   --          Lab 08/23/21 0402 08/22/21 0319 08/22/21  0028 08/21/21 1957 08/21/21  1546 08/20/21  0824 08/20/21  0439 08/19/21 2202 08/19/21 2202 08/19/21 1749 08/19/21 1749   SODIUM 126* 127* 127* 127* 127*   < > 125*   < > 124*   < > 125*   POTASSIUM 3.9 4.0 3.9 3.8 3.9   < > 3.9   < > 3.9   < > 4.4   CHLORIDE 91* 90* 91* 91* 90*   < > 86*   < > 85*   < > 84*   CO2 27.0 28.0 26.0 28.0 27.0   < > 20.0*   < > 25.0   < > 23.0   ANION GAP 8.0 9.0 10.0 8.0 10.0   < > 19.0*   < > 14.0   < > 18.0*   BUN 7 7 7 7 7   < > 10   < > 11   < > 13   CREATININE 0.43* 0.39* 0.40* 0.39* 0.43*   < > 0.35*   < > 0.44*   < > 0.49*   GLUCOSE 178* 189* 188* 183* 211*   < > 273*   < > 285*   < > 362*   CALCIUM 8.4* 8.1* 8.3* 8.2* 8.5*   < > 8.7   < > 8.7   < > 9.4   IONIZED CALCIUM  --   --   --   --   --   --   --   --   --   --  1.22   MAGNESIUM  --   --   --   --   --   --   --   --  1.8  --  1.9    PHOSPHORUS  --   --   --   --   --   --   --   --   --   --  3.7   HEMOGLOBIN A1C  --   --   --   --   --   --   --   --   --   --  7.80*   TSH  --   --   --   --   --   --  18.690*  --   --   --   --     < > = values in this interval not displayed.         Lab 08/19/21  1749   TOTAL PROTEIN 6.5   ALBUMIN 3.10*   GLOBULIN 3.4   ALT (SGPT) 22   AST (SGOT) 86*   BILIRUBIN 1.4*   ALK PHOS 330*                 Lab 08/23/21  0654 08/19/21  1749 08/19/21  1749   ABO TYPING O   < > O   RH TYPING Positive   < > Positive   ANTIBODY SCREEN Negative  --  Negative    < > = values in this interval not displayed.         Brief Urine Lab Results  (Last result in the past 365 days)      Color   Clarity   Blood   Leuk Est   Nitrite   Protein   CREAT   Urine HCG        08/20/21 0622 Yellow Clear Negative Trace Negative 30 mg/dL (1+)             Microbiology Results (last 10 days)     Procedure Component Value - Date/Time    Urine Culture - Urine, Urine, Catheter In/Out [[id number]]  (Normal) Collected: 08/19/21 2158    Lab Status: Final result Specimen: Urine, Catheter In/Out Updated: 08/20/21 2123     Urine Culture No growth    COVID PRE-OP / PRE-PROCEDURE SCREENING ORDER (NO ISOLATION) - Swab, Nasopharynx [[id number]]  (Normal) Collected: 08/19/21 2015    Lab Status: Final result Specimen: Swab from Nasopharynx Updated: 08/19/21 2034    Narrative:      The following orders were created for panel order COVID PRE-OP / PRE-PROCEDURE SCREENING ORDER (NO ISOLATION) - Swab, Nasopharynx.  Procedure                               Abnormality         Status                     ---------                               -----------         ------                     COVID-19, ABBOTT IN-HOUS...[[id number]]  Normal              Final result                 Please view results for these tests on the individual orders.    COVID-19, ABBOTT IN-HOUSE,NASAL Swab (NO TRANSPORT MEDIA) 2 HR TAT - Swab, Nasopharynx [[id number]]  (Normal) Collected: 08/19/21  2015    Lab Status: Final result Specimen: Swab from Nasopharynx Updated: 08/19/21 2034     COVID19 Presumptive Negative    Narrative:      Fact sheet for providers: https://www.fda.gov/media/853528/download     Fact sheet for patients: https://www.fda.gov/media/105524/download    Test performed by PCR.  If inconsistent with clinical signs and symptoms patient should be tested with different authorized molecular test.          CT Head With & Without Contrast    Result Date: 8/19/2021  HISTORY: Mental status change, metastatic cancer TECHNIQUE: Axial head CT with and without IV contrast. Radiation dose reduction techniques included automated exposure control or exposure modulation based on body size. Count of known CT and cardiac nuc med studies performed in previous 12 months: 0. COMPARISON: None. FINDINGS: Prominently dural based lesion is seen abutting the left posterior cerebellar hemisphere measuring approximately 2 x 1.1 cm that demonstrates enhancement. There also appears to be some potential dural thickening at the posterior apex abutting the posterior left frontal lobe, although this is somewhat equivocal. There is no definite large intraparenchymal lesion although subtle smaller lesions cannot be entirely excluded. There is no definite osseous abnormality. The sphenoid sinuses are opacified.     Overall, the findings are concerning for potential dural based metastases. The largest abuts the posterior left cerebellum and there is an equivocal second lesion at the left posterior vertex. In addition, small intraparenchymal metastases are not excluded. Consider follow-up MRI with contrast. Signer Name: Martina Malin MD  Signed: 8/19/2021 9:21 PM  Workstation Name: [id number]  [facility]    CT Abdomen Pelvis With Contrast    Result Date: 8/19/2021  CT Abdomen Pelvis W, CTA Chest INDICATION: Mental status change, endometrial carcinoma, assess for progression TECHNIQUE: CTA of the chest. CT  of the abdomen and pelvis without IV contrast. Coronal and sagittal reconstructions were obtained.  Radiation dose reduction techniques included automated exposure control or exposure modulation based on body size. Count of known CT and cardiac nuc med studies performed in previous 12 months: 0. COMPARISON: CT of the abdomen from 7/2/21 FINDINGS: CTA chest: There is no definite evidence of pulmonary embolism although there is somewhat limitation of evaluation for small subsegmental embolus. The patient's left supraclavicular lymphadenopathy and left low cervical lymphadenopathy has significantly increased in size and there is shift of the trachea to the right. Multilevel mediastinal nodes have significantly increased in size and there is significant narrowing of the eleonora and mainstem bronchi bilaterally. The esophageal lumen is also compressed and may be significantly involved. There are new small bilateral pleural effusions, greater on the right as well as multiple new lung metastases noted bilaterally. Bone marrow attenuation is somewhat mottled and underlying metastatic disease cannot be entirely excluded although no definite focal lesion is identified. Abdomen: Multiple new metastatic lesions are noted throughout the liver. Lesions are noted within the bilateral adrenal glands, and the spleen is not well evaluated due to beam hardening artifact. However, there is suspicion for multiple splenic lesions as well. Multiple large jeana masses are seen scattered throughout the abdomen. There is also flattening of the inferior vena cava secondary to jeana mass. Pelvis: Malignant lesion abuts the wall of the rectum anteriorly. There is a small amount of fluid in the pelvis. Multiple abnormal aortocaval and periaortic nodes are noted. No acute bowel abnormality is appreciated. No acute osseous abnormality.     1. There has been gross interval progression of metastatic disease as described above throughout the chest,  abdomen and pelvis. 2. There is no definite evidence of pulmonary embolism although there is somewhat limitation of evaluation for small subsegmental embolus. Signer Name: Martina Malin MD  Signed: 8/19/2021 9:31 PM  Workstation Name: [id number]  [facility]    FL Video Swallow With Speech Single Contrast    Result Date: 8/20/2021  EXAMINATION: FL VIDEO SWALLOW W SPEECH SINGLE-CONTRAST-, FL LIMITED UGI FOR MBS REFLUX SINGLE-CONTRAST-  INDICATION: dysphagia, known esophageal displacement per imaging  TECHNIQUE: 1 minute and 18 seconds of fluoroscopic time was used. 8 associated fluoroscopic loops were saved. The patient was evaluated in the seated lateral position while taking a variety of consistencies of barium by mouth under the direction of speech pathology.  COMPARISON: NONE  FINDINGS: 1 minute and 18 seconds of fluoroscopy provided for a modified barium swallow, and limited upper GI series. Please see speech therapy report for full details and recommendations. There is a moderate sized filling defect seen in the area of the oral pharynx. This filling defect persists throughout multiple images, but does not appear to impede swallowing of barium, however further evaluation may be considered if clinically relevant. No focal esophageal strictures were seen. Gastroesophageal reflux was not demonstrated during this exam.       Fluoroscopy provided for a modified barium swallow, and limited upper GI series. Please see speech therapy report for full details and recommendations. Moderate sized filling defect seen in the area of the oropharynx, which does not appear to significantly impede swallowing of barium. Further evaluation may be considered if clinically relevant.    This report was finalized on 8/20/2021 4:31 PM by Dr. Jun Gonzales.      FL Limited Ugi For Mbs Reflux Single-Contrast    Result Date: 8/20/2021  EXAMINATION: FL VIDEO SWALLOW W SPEECH SINGLE-CONTRAST-, FL LIMITED UGI FOR MBS  REFLUX SINGLE-CONTRAST-  INDICATION: dysphagia, known esophageal displacement per imaging  TECHNIQUE: 1 minute and 18 seconds of fluoroscopic time was used. 8 associated fluoroscopic loops were saved. The patient was evaluated in the seated lateral position while taking a variety of consistencies of barium by mouth under the direction of speech pathology.  COMPARISON: NONE  FINDINGS: 1 minute and 18 seconds of fluoroscopy provided for a modified barium swallow, and limited upper GI series. Please see speech therapy report for full details and recommendations. There is a moderate sized filling defect seen in the area of the oral pharynx. This filling defect persists throughout multiple images, but does not appear to impede swallowing of barium, however further evaluation may be considered if clinically relevant. No focal esophageal strictures were seen. Gastroesophageal reflux was not demonstrated during this exam.       Fluoroscopy provided for a modified barium swallow, and limited upper GI series. Please see speech therapy report for full details and recommendations. Moderate sized filling defect seen in the area of the oropharynx, which does not appear to significantly impede swallowing of barium. Further evaluation may be considered if clinically relevant.    This report was finalized on 8/20/2021 4:31 PM by Dr. Jun Gonzales.      CT Angiogram Chest    Result Date: 8/19/2021  CT Abdomen Pelvis W, CTA Chest INDICATION: Mental status change, endometrial carcinoma, assess for progression TECHNIQUE: CTA of the chest. CT of the abdomen and pelvis without IV contrast. Coronal and sagittal reconstructions were obtained.  Radiation dose reduction techniques included automated exposure control or exposure modulation based on body size. Count of known CT and cardiac nuc med studies performed in previous 12 months: 0. COMPARISON: CT of the abdomen from 7/2/21 FINDINGS: CTA chest: There is no definite evidence of pulmonary  embolism although there is somewhat limitation of evaluation for small subsegmental embolus. The patient's left supraclavicular lymphadenopathy and left low cervical lymphadenopathy has significantly increased in size and there is shift of the trachea to the right. Multilevel mediastinal nodes have significantly increased in size and there is significant narrowing of the eleonora and mainstem bronchi bilaterally. The esophageal lumen is also compressed and may be significantly involved. There are new small bilateral pleural effusions, greater on the right as well as multiple new lung metastases noted bilaterally. Bone marrow attenuation is somewhat mottled and underlying metastatic disease cannot be entirely excluded although no definite focal lesion is identified. Abdomen: Multiple new metastatic lesions are noted throughout the liver. Lesions are noted within the bilateral adrenal glands, and the spleen is not well evaluated due to beam hardening artifact. However, there is suspicion for multiple splenic lesions as well. Multiple large jeana masses are seen scattered throughout the abdomen. There is also flattening of the inferior vena cava secondary to jeana mass. Pelvis: Malignant lesion abuts the wall of the rectum anteriorly. There is a small amount of fluid in the pelvis. Multiple abnormal aortocaval and periaortic nodes are noted. No acute bowel abnormality is appreciated. No acute osseous abnormality.     1. There has been gross interval progression of metastatic disease as described above throughout the chest, abdomen and pelvis. 2. There is no definite evidence of pulmonary embolism although there is somewhat limitation of evaluation for small subsegmental embolus. Signer Name: Martina Malin MD  Signed: 8/19/2021 9:31 PM  Workstation Name: [id number]  [facility]            Discharge Details        Discharge Medications      New Medications      Instructions Start Date   levETIRAcetam  1000 MG tablet  Commonly known as: Keppra   1,000 mg, Oral, 2 Times Daily         Changes to Medications      Instructions Start Date   dexamethasone 4 MG tablet  Commonly known as: DECADRON  What changed:   · how much to take  · how to take this  · when to take this  · additional instructions   4 mg, Oral, 2 Times Daily With Meals      oxyCODONE-acetaminophen  MG per tablet  Commonly known as: Percocet  What changed:   · how much to take  · reasons to take this   1 tablet, Oral, Every 6 Hours PRN         Continue These Medications      Instructions Start Date   acetaminophen 325 MG tablet  Commonly known as: Tylenol   650 mg, Oral, Every 6 Hours PRN      Black Cohosh 40 MG capsule   2 tablets, Oral, Daily      busPIRone 10 MG tablet  Commonly known as: BUSPAR   10 mg, Oral, 3 Times Daily      docusate sodium 100 MG capsule  Commonly known as: COLACE   100 mg, Oral, 2 Times Daily      Farxiga 10 MG tablet  Generic drug: Dapagliflozin Propanediol   10 mg, Oral, Daily      gabapentin 300 MG capsule  Commonly known as: NEURONTIN   600 mg, Oral, 3 Times Daily      glimepiride 2 MG tablet  Commonly known as: AMARYL   2 mg, Oral, 2 times daily      HumuLIN R U-500 KwikPen 500 UNIT/ML solution pen-injector CONCENTRATED injection  Generic drug: Insulin Regular Human (Conc)   Subcutaneous, 3 Times Daily Before Meals, 180 units before breakfast, 190 units before lunch and supper      ibuprofen 800 MG tablet  Commonly known as: ADVIL,MOTRIN   800 mg, Oral, Every 8 Hours PRN      levothyroxine 125 MCG tablet  Commonly known as: SYNTHROID, LEVOTHROID   250 mcg, Oral, Take As Directed, Take 2 tablets six days a week      lisinopril 20 MG tablet  Commonly known as: PRINIVIL,ZESTRIL   20 mg, Oral, Daily      loratadine 10 MG tablet  Commonly known as: Claritin   Take 1 tab the night before chemo then take 1 tab the morning of chemo.      megestrol 40 MG tablet  Commonly known as: MEGACE   80 mg, Oral, 2 Times Daily       metFORMIN  MG 24 hr tablet  Commonly known as: GLUCOPHAGE-XR   2,000 mg, Oral, Nightly      mirtazapine 15 MG tablet  Commonly known as: REMERON   15 mg, Oral, Nightly      Morphine 30 MG 12 hr tablet  Commonly known as: MS Contin   30 mg, Oral, 2 Times Daily, 1 tablet every 12 hours      multivitamin tablet tablet   1 tablet, Oral, Daily      OMEGA-3 FATTY ACIDS PO   2 tablets, Oral, 2 times daily      ondansetron 8 MG tablet  Commonly known as: ZOFRAN   8 mg, Oral, 3 Times Daily PRN      oxyCODONE 10 MG tablet  Commonly known as: ROXICODONE   Take 1-2 tablets by mouth Every 6 (Six) Hours As Needed for pain      PARoxetine 40 MG tablet  Commonly known as: PAXIL   40 mg, Oral, Daily      polyethylene glycol 17 GM/SCOOP powder  Commonly known as: MIRALAX   17 g, Oral, Daily, As needed for constipation if no bowel movement in 2 days      PrilOSEC 20 MG capsule  Generic drug: omeprazole   20 mg, Oral, Daily      prochlorperazine 10 MG tablet  Commonly known as: COMPAZINE   10 mg, Oral, Every 4 Hours PRN      rosuvastatin 40 MG tablet  Commonly known as: CRESTOR   40 mg, Oral, Every Evening      Senna 8.6 MG capsule   1 capsule, Oral, Daily             Allergies   Allergen Reactions   • Codeine Nausea Only and Dizziness         Discharge Disposition:  Home or Self Care    Diet:  Hospital:  Diet Order   Procedures   • Diet Soft Texture; Whole Foods; Consistent Carbohydrate       Activity:  Activity Instructions     Activity as Tolerated            CODE STATUS:    Code Status and Medical Interventions:   Ordered at: 08/20/21 0747     Limited Support to NOT Include:    Intubation    Cardioversion/Defibrillation     Code Status:    No CPR     Medical Interventions (Level of Support Prior to Arrest):    Limited       Future Appointments   Date Time Provider Department Center   9/21/2021 10:30 AM Eli Baker, MAKAYLA HOOPER Y[facility] None       Additional Instructions for the Follow-ups that You Need to Schedule      Discharge Follow-up with Specified Provider: Hospice and Dr. Jensen as needed   As directed      To: Hospice and Dr. Jensen as needed                     Trang Holbrook MD  08/23/21      Time Spent on Discharge:  I spent  32 minutes on this discharge activity which included: face-to-face encounter with the patient, reviewing the data in the system, coordination of the care with the nursing staff as well as consultants, documentation, and entering orders.

## 2021-08-23 NOTE — PROGRESS NOTES
Continued Stay Note  [facility]     Patient Name: Shannan Jha  MRN: [id number]  Today's Date: 8/23/2021    Admit Date: 8/19/2021    Discharge Plan     Row Name 08/23/21 1658       Plan    Plan  Ongoing    Plan Comments  Visit made to pt. @ 1230. Dr. Jensen present, as well as pt's . Dr. Jensen stated pt is scheduled to discharge home today, though has some concerns with pt having increased weakness and unable to transfer self to the bedside commode. Current plan is for  to trasport pt home. Dr. Jensen asking if pt is able to be admitted to inpatient. Teaching done with Dr. Jensen and pt/spouse that currently pt has no unmanaged symptoms which inpatient hospice would need to treat, though pt may have hospice at home. Pt did not  make any decisions regarding hospice during the visit.  and pt expressed concern with how to best care for pt at home. Discussed the need for paid caregivers. Dr. Jensen stated pt will be receiving a unit of blood today. Returned visit made to pt at 1430, blood infusing. Spoke with pt and   regarding transportation home, discussed due to pt's increased weakness and difficulty getting to the bedside commode having pt transport via ambulance, pt and  agreeable to this plan. Pt and  have not made a decision regarding hospice care at home. Hospice liaison will follow up with pt in the morning.  Spoke with FRIEDA Mujica with [facility], next available ambulance is tomorrow at 1600. Message sent to Dr. Holbrook regarding change in discharge plans, Dr. Holbrook agreeable to this plan. Please call [phone] if can be of further assistance.        Discharge Codes    No documentation.       Expected Discharge Date and Time     Expected Discharge Date Expected Discharge Time    Aug 23, 2021             Lynsey Rodrigues, RN

## 2021-08-24 NOTE — PROGRESS NOTES
Continued Stay Note  [facility]     Patient Name: Shannan Jha  MRN: [id number]  Today's Date: 8/24/2021    Admit Date: 8/19/2021    Discharge Plan     Row Name 08/24/21 0935       Plan    Plan Home with [facility]    Patient/Family in Agreement with Plan yes    Final Discharge Disposition Code 50 - home with hospice    Final Note F/u visit completed with pt./spouse @ [facility]. Pt./spouse state this morning that they would like to have [facility] services @ d/c. Pt./spouse are aware that the ambulance is scheduled for 1600. 24hr contact number was provided to the pt./spouse for [facility]. Dr. Holbrook, Dr. Jensen, [facility] RN, Gianluca, & CM, Samreen made aware of the pt.’s disposition. Call placed to [facility] Intake informing them that the pt. was going to d/c home today & that she & her spouse have opted for Hosp[facility] services. [facility] also was made aware of the pt.’s ambulance time.        Discharge Codes    No documentation.       Expected Discharge Date and Time     Expected Discharge Date Expected Discharge Time    Aug 23, 2021             Delores Whatley

## 2021-08-24 NOTE — OUTREACH NOTE
Prep Survey      Responses   [facility] facility patient discharged from?  [facility]   Is LACE score < 7 ?  No   Emergency Room discharge w/ pulse ox?  No   Eligibility  Not Eligible   What are the reasons patient is not eligible?  Hospice/Pallative Care   Does the patient have one of the following disease processes/diagnoses(primary or secondary)?  Other   Prep survey completed?  Yes          Shena Malin RN

## 2021-08-24 NOTE — DISCHARGE PLACEMENT REQUEST
"Abhijeet Shannan NAIDU (51 y.o. Female)   Referring MD: Dr. Trang Holbrook  PCP: Dr. Maricel Quinteros  Covid negative on 8/19/21  BMI: 29.82 kg  Hospice diagnosis: Metastatic Endometrial CA    Date of Birth Social Security Number Address Home Phone MRN    1970  [address] [address] [phone] [id number]    [employer] Marital Status          Episcopal        Admission Date Admission Type Admitting Provider Attending Provider Department, Room/Bed    8/19/21 Urgent Trang Holbrook MD Reddy, Mayuri V, MD [address], S576/1    Discharge Date Discharge Disposition Discharge Destination         Home or Self Care              Attending Provider: Trang Holbrook MD    Allergies: Codeine    Isolation: None   Infection: None   Code Status: No CPR    Ht: 175.3 cm (69.02\")   Wt: 91.6 kg (202 lb)    Admission Cmt: None   Principal Problem: Endometrial cancer (CMS/HCC) [C54.1]                 Active Insurance as of 8/19/2021     Primary Coverage     Payor Plan Insurance Group Employer/Plan Group    ANTHEM BLUE CROSS ANTHEM BLUE CROSS BLUE [employer]IELD PPO [id number]     Payor Plan Address Payor Plan Phone Number Payor Plan Fax Number Effective Dates    [address] [phone]  5/1/2020 - None Entered    [place] [address]       Subscriber Name Subscriber Birth Date Member ID       MAYA BRUNO 10/1/1965 [id number]                 Emergency Contacts      (Rel.) Home Phone Work Phone Mobile Phone    MAYA BRUNO (Spouse) [phone] -- [phone]            Emergency Contact Information     Name Relation Home Work Mobile    MAYA BRUNO Spouse [phone]          Insurance Information                ANTHEM BLUE CROSS/[employer]HEM BLUE CROSS BLUE SHI[employer] PPO Phone: [phone]    Subscriber: Maya Burno Subscriber#: [id number]    Group#: [id number] Precert#: [id number]          Problem List         Codes Noted - Resolved       Hospital    Severe malnutrition " (CMS/HCC) ICD-10-CM: E43  ICD-9-CM: 261 2021 - Present    Hyponatremia ICD-10-CM: E87.1  ICD-9-CM: 276.1 2021 - Present    Acute anemia ICD-10-CM: D64.9  ICD-9-CM: 285.9 2021 - Present    Generalized weakness ICD-10-CM: R53.1  ICD-9-CM: 780.79 2021 - Present    Endometrial cancer, FIGO stage IVB (CMS/HCC) ICD-10-CM: C54.1  ICD-9-CM: 182.0 2021 - Present    Drug induced constipation ICD-10-CM: K59.03  ICD-9-CM: 564.09, E980.5 2021 - Present    Cancer related pain ICD-10-CM: G89.3  ICD-9-CM: 338.3 2021 - Present    Dehydration ICD-10-CM: E86.0  ICD-9-CM: 276.51 2021 - Present    Type 2 diabetes mellitus (CMS/HCC) ICD-10-CM: E11.9  ICD-9-CM: 250.00 7/15/2021 - Present    * (Principal) Endometrial cancer (CMS/HCC) ICD-10-CM: C54.1  ICD-9-CM: 182.0 6/3/2021 - Present             History & Physical      Jack Flores MD at [address] [place] [address] Medicine Services  HISTORY AND PHYSICAL    Patient Name: Shannan Jha  : 1970  MRN: [id number]  Primary Care Physician: Maricel Quinteros MD  Date of admission: 2021    Subjective     Chief Complaint: anemia, weakness    HPI:  Shannan Jha is a 51 y.o. female with PMH significant for HTN, HLD, insulin-dependent DMII and hypothyroidism. On 21 a TVUS revealed 3.2cm endometrial stripe. Biopsy of a cervical polyp was concerning for endometrial adenocarcinoma. She was referred to GYN/ONC and underwent robotic-assisted tumor debulking with total laparoscopic hysterectomy for uterus weighing > 250g, BSO, L ureterolysis, peritoneal stripping, lysis of adhesions, cyscoscopy with L temporary stent placement and biopsy of clitoral mass. Pathology returned with dedifferentiated endometrial carcinoma with involvement of the L fallopian tube and ovary and R periadnexal soft tissue. Clitoral munoz mass with dedifferentiated carcinoma myometrium. CT abdomen/pelvis showed diffuse metastatic  disease with parenchymal liver lesions, suspicious for metastasis as well as supraclavicular, L rectopectoral, mediastinal, janice hepatis, retroperitoneal and pelvic adenopathy. Soft tissue mass at peritoneum measuring 4.5 x 4.1 cm.    She was placed on PACLitaxel/CARBOplatin Q21D for metastatic endometrial cancer.   She has since had issues with pain, hyponatremia and anemia.     She was directly admitted to [facility] 8/19/21 following an appointment with Dr. Jensen of GYN/ONC. Patient responded minimally and the majority of history was obtained from the patient's  and her chart. Ms. Carpenter has been getting increasingly weak over the past couple of weeks. Her oral intake has been poor. Her pain has been poorly-controlled with her current regimen. No fevers/chills, abdominal pain, nausea or vomiting. No chest pain or dyspnea.  denied confusion but he did note that her speech was difficult to understand when she first arrived to her hospital room.     CBC prior to admission revealed Hgb 6.4   CMP / Mag / Phos still pending.     Patient and her  tearful regarding goals of care discussions. They are open to palliative care consultation but seemed somewhat overwhelmed when we discussed power of , advanced directive and code status. I suggested that they take some time to think it over and we could revisit this topic in the upcoming says. They were in agreement for FULL CODE at this time.     COVID Details:    Symptoms:    [x] NONE [] Fever []  Cough [] Shortness of breath [] Change in taste/smell      The patient has a COVID HM Topic on their chart, and they are fully vaccinated.    Review of Systems   Constitutional: Positive for activity change, appetite change and fatigue. Negative for chills and fever.   HENT: Negative.    Respiratory: Negative.    Cardiovascular: Positive for leg swelling.   Gastrointestinal: Negative for abdominal pain, constipation, diarrhea, nausea and  vomiting.   Genitourinary: Negative for difficulty urinating.   Musculoskeletal: Positive for arthralgias, back pain and neck pain.   Skin: Negative.    Neurological: Positive for weakness.   Hematological: Negative.    Psychiatric/Behavioral: Negative.       All other systems reviewed and are negative.     Personal History     Past Medical History:   Diagnosis Date   • Adenocarcinoma (CMS/HCC)     endometrial   • Anxiety    • Depression    • Diabetes (CMS/HCC)    • GERD (gastroesophageal reflux disease)    • Heart murmur    • Hyperlipidemia    • Hypertension    • Thyroid disease    • Uterine cancer (CMS/HCC)      Past Surgical History:   Procedure Laterality Date   • ENDOSCOPY     • GALLBLADDER SURGERY     • TOTAL LAPAROSCOPIC HYSTERECTOMY SALPINGO OOPHORECTOMY N/A 6/18/2021    Procedure: TOTAL LAPAROSCOPIC RADICAL TUMOR DEBULKING WITH TOTAL HYSTERECTOMY (UTERUS > 250 g), BILATERAL SALPINGO-OOPHORECTOMY, LEFT URETROLYSIS, PERTIONEAL STRIPPING, LYSIS OF ADHESIONS (>90 MINUTES), CYSTOSCOPY WITH LEFT TEMPORARY STENT PLACEMENT, AND INJECTION FOR SENTINEL LYMPH NODE DISSECTION;  Surgeon: Jolie Jensen MD;  Location: [facility];  Service: Robotics - DaVinci;  Laterality: N/A   • WISDOM TOOTH EXTRACTION       Family History: family history includes Arthritis in her father; Cancer in her father; Diabetes in her brother and mother; Heart attack in her mother; Heart disease in her mother; Stroke in her mother; Thyroid disease in her brother and father. Otherwise pertinent FHx was reviewed and unremarkable.     Social History:  reports that she has never smoked. She has never used smokeless tobacco. She reports that she does not drink alcohol and does not use drugs.  Social History     Social History Narrative   • Not on file     Medications:  Black Cohosh, Dapagliflozin Propanediol, Insulin Regular Human (Conc), Morphine, Omega-3 Fatty Acids, PARoxetine, Senna, acetaminophen, busPIRone, dexamethasone, docusate sodium,  gabapentin, glimepiride, ibuprofen, levothyroxine, lisinopril, loratadine, megestrol, metFORMIN ER, mirtazapine, multivitamin, omeprazole, ondansetron, oxyCODONE, oxyCODONE-acetaminophen, polyethylene glycol, prochlorperazine, and rosuvastatin    Allergies   Allergen Reactions   • Codeine Nausea Only and Dizziness     Objective     Vital Signs:   Temp:  [95.7 °F (35.4 °C)-100.1 °F (37.8 °C)] 100.1 °F (37.8 °C)  Heart Rate:  [] 96  Resp:  [14-18] 16  BP: (112-123)/(59-70) 115/68    Physical Exam   Constitutional: Awake, alert and conversant. Sitting on side of bed   Eyes: PERRLA, sclerae anicteric, no conjunctival injection  HENT: NCAT, mucous membranes dry   Neck: Supple, no thyromegaly, no lymphadenopathy, trachea midline  Respiratory: Clear to auscultation bilaterally, nonlabored respirations on room air   Cardiovascular: Regular rhythm, tachycardic with rate 110's. No m/r/g.   Gastrointestinal: Positive bowel sounds, soft, nontender, mildly distended  Musculoskeletal: 2+ pitting bilateral ankle edema, no clubbing or cyanosis to extremities  Psychiatric: Flat affect, cooperative  Neurologic: Globally weak, speech soft but clear (1-2 word responses), no focal deficits     Results Reviewed:  I have personally reviewed most recent indicated data and agree with findings including:  [x]  Laboratory  []  Radiology  []  EKG/Telemetry  [x]  Pathology  []  Cardiac/Vascular Studies  [x]  Old records  []  Other:    LAB RESULTS:      Lab 08/19/21 1953 08/19/21  1749 08/19/21  1418   WBC  --  4.28 4.70   HEMOGLOBIN  --  6.4* 6.4*   HEMATOCRIT  --  18.9* 18.9*   PLATELETS  --  135* 177   NEUTROS ABS  --  3.03 3.80   IMMATURE GRANS (ABS)  --  0.43*  --    LYMPHS ABS  --  0.41* 0.60*   MONOS ABS  --  0.35 0.30   EOS ABS  --  0.05  --    MCV  --  78.1* 80.7   LACTATE 2.0  --   --          Lab 08/19/21 2202 08/19/21  1749   SODIUM 124* 125*   POTASSIUM 3.9 4.4   CHLORIDE 85* 84*   CO2 25.0 23.0   ANION GAP 14.0 18.0*   BUN  11 13   CREATININE 0.44* 0.49*   GLUCOSE 285* 362*   CALCIUM 8.7 9.4   IONIZED CALCIUM  --  1.22   MAGNESIUM 1.8 1.9   PHOSPHORUS  --  3.7   HEMOGLOBIN A1C  --  7.80*         Lab 08/19/21  1749   TOTAL PROTEIN 6.5   ALBUMIN 3.10*   GLOBULIN 3.4   ALT (SGPT) 22   AST (SGOT) 86*   BILIRUBIN 1.4*   ALK PHOS 330*                 Lab 08/19/21  1749   ABO TYPING O   RH TYPING Positive   ANTIBODY SCREEN Negative         Brief Urine Lab Results  (Last result in the past 365 days)      Color   Clarity   Blood   Leuk Est   Nitrite   Protein   CREAT   Urine HCG        08/19/21 2158 Yellow Clear Moderate (2+) Trace Positive 100 mg/dL (2+)             Microbiology Results (last 10 days)     Procedure Component Value - Date/Time    COVID PRE-OP / PRE-PROCEDURE SCREENING ORDER (NO ISOLATION) - Swab, Nasopharynx [[id number]]  (Normal) Collected: 08/19/21 2015    Lab Status: Final result Specimen: Swab from Nasopharynx Updated: 08/19/21 2034    Narrative:      The following orders were created for panel order COVID PRE-OP / PRE-PROCEDURE SCREENING ORDER (NO ISOLATION) - Swab, Nasopharynx.  Procedure                               Abnormality         Status                     ---------                               -----------         ------                     COVID-19, ABBOTT IN-HOUS...[[id number]]  Normal              Final result                 Please view results for these tests on the individual orders.    COVID-19, ABBOTT IN-HOUSE,NASAL Swab (NO TRANSPORT MEDIA) 2 HR TAT - Swab, Nasopharynx [[id number]]  (Normal) Collected: 08/19/21 2015    Lab Status: Final result Specimen: Swab from Nasopharynx Updated: 08/19/21 2034     COVID19 Presumptive Negative    Narrative:      Fact sheet for providers: https://www.fda.gov/media/505697/download     Fact sheet for patients: https://www.fda.gov/media/692601/download    Test performed by PCR.  If inconsistent with clinical signs and symptoms patient should be tested with different  authorized molecular test.        CT Head With & Without Contrast    Result Date: 8/19/2021  HISTORY: Mental status change, metastatic cancer TECHNIQUE: Axial head CT with and without IV contrast. Radiation dose reduction techniques included automated exposure control or exposure modulation based on body size. Count of known CT and cardiac nuc med studies performed in previous 12 months: 0. COMPARISON: None. FINDINGS: Prominently dural based lesion is seen abutting the left posterior cerebellar hemisphere measuring approximately 2 x 1.1 cm that demonstrates enhancement. There also appears to be some potential dural thickening at the posterior apex abutting the posterior left frontal lobe, although this is somewhat equivocal. There is no definite large intraparenchymal lesion although subtle smaller lesions cannot be entirely excluded. There is no definite osseous abnormality. The sphenoid sinuses are opacified.     Impression: Overall, the findings are concerning for potential dural based metastases. The largest abuts the posterior left cerebellum and there is an equivocal second lesion at the left posterior vertex. In addition, small intraparenchymal metastases are not excluded. Consider follow-up MRI with contrast. Signer Name: Martina Malin MD  Signed: 8/19/2021 9:21 PM  Workstation Name: [id number]  [facility]    CT Abdomen Pelvis With Contrast    Result Date: 8/19/2021  CT Abdomen Pelvis W, CTA Chest INDICATION: Mental status change, endometrial carcinoma, assess for progression TECHNIQUE: CTA of the chest. CT of the abdomen and pelvis without IV contrast. Coronal and sagittal reconstructions were obtained.  Radiation dose reduction techniques included automated exposure control or exposure modulation based on body size. Count of known CT and cardiac nuc med studies performed in previous 12 months: 0. COMPARISON: CT of the abdomen from 7/2/21 FINDINGS: CTA chest: There is no definite  evidence of pulmonary embolism although there is somewhat limitation of evaluation for small subsegmental embolus. The patient's left supraclavicular lymphadenopathy and left low cervical lymphadenopathy has significantly increased in size and there is shift of the trachea to the right. Multilevel mediastinal nodes have significantly increased in size and there is significant narrowing of the eleonora and mainstem bronchi bilaterally. The esophageal lumen is also compressed and may be significantly involved. There are new small bilateral pleural effusions, greater on the right as well as multiple new lung metastases noted bilaterally. Bone marrow attenuation is somewhat mottled and underlying metastatic disease cannot be entirely excluded although no definite focal lesion is identified. Abdomen: Multiple new metastatic lesions are noted throughout the liver. Lesions are noted within the bilateral adrenal glands, and the spleen is not well evaluated due to beam hardening artifact. However, there is suspicion for multiple splenic lesions as well. Multiple large jeana masses are seen scattered throughout the abdomen. There is also flattening of the inferior vena cava secondary to jeana mass. Pelvis: Malignant lesion abuts the wall of the rectum anteriorly. There is a small amount of fluid in the pelvis. Multiple abnormal aortocaval and periaortic nodes are noted. No acute bowel abnormality is appreciated. No acute osseous abnormality.     Impression: 1. There has been gross interval progression of metastatic disease as described above throughout the chest, abdomen and pelvis. 2. There is no definite evidence of pulmonary embolism although there is somewhat limitation of evaluation for small subsegmental embolus. Signer Name: Martina Malin MD  Signed: 8/19/2021 9:31 PM  Workstation Name: [id number]  [facility]    CT Angiogram Chest    Result Date: 8/19/2021  CT Abdomen Pelvis W, CTA Chest INDICATION:  Mental status change, endometrial carcinoma, assess for progression TECHNIQUE: CTA of the chest. CT of the abdomen and pelvis without IV contrast. Coronal and sagittal reconstructions were obtained.  Radiation dose reduction techniques included automated exposure control or exposure modulation based on body size. Count of known CT and cardiac nuc med studies performed in previous 12 months: 0. COMPARISON: CT of the abdomen from 7/2/21 FINDINGS: CTA chest: There is no definite evidence of pulmonary embolism although there is somewhat limitation of evaluation for small subsegmental embolus. The patient's left supraclavicular lymphadenopathy and left low cervical lymphadenopathy has significantly increased in size and there is shift of the trachea to the right. Multilevel mediastinal nodes have significantly increased in size and there is significant narrowing of the eleonora and mainstem bronchi bilaterally. The esophageal lumen is also compressed and may be significantly involved. There are new small bilateral pleural effusions, greater on the right as well as multiple new lung metastases noted bilaterally. Bone marrow attenuation is somewhat mottled and underlying metastatic disease cannot be entirely excluded although no definite focal lesion is identified. Abdomen: Multiple new metastatic lesions are noted throughout the liver. Lesions are noted within the bilateral adrenal glands, and the spleen is not well evaluated due to beam hardening artifact. However, there is suspicion for multiple splenic lesions as well. Multiple large jeana masses are seen scattered throughout the abdomen. There is also flattening of the inferior vena cava secondary to jeana mass. Pelvis: Malignant lesion abuts the wall of the rectum anteriorly. There is a small amount of fluid in the pelvis. Multiple abnormal aortocaval and periaortic nodes are noted. No acute bowel abnormality is appreciated. No acute osseous abnormality.      Impression: 1. There has been gross interval progression of metastatic disease as described above throughout the chest, abdomen and pelvis. 2. There is no definite evidence of pulmonary embolism although there is somewhat limitation of evaluation for small subsegmental embolus. Signer Name: Martina Malin MD  Signed: 8/19/2021 9:31 PM  Workstation Name: [id number]  [facility]      Assessment & Plan       Endometrial cancer (CMS/HCC)    Type 2 diabetes mellitus (CMS/HCC)    Dehydration    Drug induced constipation    Cancer related pain    Acute anemia    Generalized weakness    Endometrial cancer, FIGO stage IVB (CMS/HCC)       Shannan Jha is a 51 y.o. female w/ hx insulin dep DM2, hypothyroidism who recently was diagnosed w/ endometrial cancer 5/20/21 w/ biopsy of cervical polyp (after ultrasound revealed thickened endometrial stripe). On 6/18/21 patient nderwent robitic assisted laparoscopic SUSHILA, BSO, left ureterolysis, peritoneal stripping, sussy, cystoscopy w/ temporary stent placement biopsy of clitoral mass. Pathology revealed endometrial carcinoma w/ subsequent ct scan 7/2/21 revealing diffuse metastatic disease involving liver, diffuse adenopathy (left supraclavicular, left retropectoral, mediastinal, janice hepatis, retroperitoneum, and pelvis). Patient underwent chemotherapy on 7/15/21 (paclitaxel and carboplatin). Patient has developed hyponatremia and anemia. Labs on 8/12/21 revealed sodium 124 (w/ glucose 331) and hgb 7.3. patient.        Presented back to Dr. Jensen's office 8/19/21 w/ worsening fatigue, confusion, ataxia and worsening right neck pain radiating to her head and back, having not eaten much in the two days prior related to difficulty swallowing and choking when she eats or drinks, along with some dyspnea. hgb was noted to be 6.4 and arrangements were made for direct admission to hospitalist service. Subsequent labwork revealed sodium 125, glucose 362, serum  bicarb 23 w/ anion gap 18. Normal lactate. a1c 7.8. mildly elevated bilirubin 1.4. patient was ordered iv fluids, 2 units or blood, and frequent glucose monitoring & correction insulin. Patient developed spells of somnolence, during which patient would close her eyes but was difficult to arouse, brief in duratoin. No tonic/clonic activity, no rigidity, no starting spells, no focal weakness. Patient had another spell lasting 5-10 minutes and was given keppra 1000mg. Ct head/chest/abd/pelvis w/ contrast was obtained.   Ct head revealed probable dural based metastatic disease, largest 2 x 1.1 cm dural based lesion abutting left posterior cerebellar hemisphere w/ enhancement, and some potential dural thickening posterior apex abutting the posterior left frontal lobe. Ct c/a/p revealed no pulmonary embolism, but showed significant diffuse interval progression of metastatic disease including progression of supraclavicular and cervical lymphadenopathy w/ shift of trachea to right, mediastinal adenopathy w/ narrowing of eleonora and bilateral mainstem bronchi, esophageal lumen compression; new small bilateral effusions w/ new multiple lung mets, worsening dz throughout the liver, splenic lesions, and large jeana masses seen throughout the abdomen and flattening of the inferior vena cava secondary to jeana mass; malignant lesion also abuts the wall of rectum anterioly. Was initiated on decadron 4mg iv q6h and mri brain w & w/o ordered. I had extensive discussion w/ patient and  at bedside and discussed results of the scans.   Due to rapid and diffuse progression of metastatic disease (including brain) we discussed the poor prognosis, and  (patient confused) stated that in event of cardiopulmonary arrest would not wish to pursue mechanical ventilation or cpr, as such patient DNR/DNI, however supporting otherwise at this point. Subsequent urinalysis revealed uti and rocephin was initiated. Palliative consulted for  morning and Dr. Jensen consulted as well        Rapidly Progressive Metastatic Endometrial Cancer  Encephalopathy/confusion (due to brain mets, +/- subclinical seizure, + uti, + hyponatremia)  Malignancy related pain  -CT scans suggesting new brain mets, new lung mets, adenopathy (including mediastinal) w/ mainstem bronchi & tracheal displacement, diffuse liver disease, suggestion of splenic dz, and adnopathy displacing the inferior vena cava  -mri brain ordered  -keppra empiric rx  -decadron 4mg iv q6h  -Dr. Jensen consulted for morning  -Palliative consulted (goals/limits, symptoms)  Anemia, likely related to chemotherapy  -hgb 6.4  -transufins 2 unit prbc  Hyponatremia  -q4h bmp  -ns 75cc/hr, monitor  Uncontrolled hyperglycemia/DM2  -hasn't been taking her insulin; currently worsened by steroids  -q3h fsbs w/ medium correction humalog  UTI, poa  -rocephin day #1, follow culture  Dysphagia  -SLP  -likely due to thrush and esophageal displacement due to cancer/adenopathy  Thrush  -single dose micafungin (couldn't give diflucan due to other home meds)  -nystatin s&s      DVT prophylaxis: mechanical    *total of 70 minutes care provided, >50% spent at bedside counseling patient &     CODE STATUS:    Code Status and Medical Interventions:   Ordered at: 08/19/21 2240     Limited Support to NOT Include:    Intubation     Code Status:    No CPR     Medical Interventions (Level of Support Prior to Arrest):    Limited     This note has been completed as part of a split-shared workflow.     Electronically signed by Perla Ambrose PA-C, 08/19/21, 5:30 PM EDT.          Attending   Admission Attestation       I have seen and examined the patient, performing an independent face-to-face diagnostic evaluation with plan of care reviewed and developed with the advanced practice clinician (APC).      Brief Summary Statement:   Shannan Jha is a 51 y.o. female w/ hx insulin dep DM2, hypothyroidism who recently was  diagnosed w/ endometrial cancer 5/20/21 w/ biopsy of cervical polyp (after ultrasound revealed thickened endometrial stripe). On 6/18/21 patient nderwent robitic assisted laparoscopic SUSHILA, BSO, left ureterolysis, peritoneal stripping, sussy, cystoscopy w/ temporary stent placement biopsy of clitoral mass. Pathology revealed endometrial carcinoma w/ subsequent ct scan 7/2/21 revealing diffuse metastatic disease involving liver, diffuse adenopathy (left supraclavicular, left retropectoral, mediastinal, janice hepatis, retroperitoneum, and pelvis). Patient underwent chemotherapy on 7/15/21 (paclitaxel and carboplatin). Patient has developed hyponatremia and anemia. Labs on 8/12/21 revealed sodium 124 (w/ glucose 331) and hgb 7.3. patient.        Presented back to Dr. Jensen's office 8/19/21 w/ worsening fatigue, confusion, ataxia and worsening right neck pain radiating to her head and back, having not eaten much in the two days prior related to difficulty swallowing and choking when she eats or drinks, along with some dyspnea. hgb was noted to be 6.4 and arrangements were made for direct admission to hospitalist service. Subsequent labwork revealed sodium 125, glucose 362, serum bicarb 23 w/ anion gap 18. Normal lactate. a1c 7.8. mildly elevated bilirubin 1.4. patient was ordered iv fluids, 2 units or blood, and frequent glucose monitoring & correction insulin. Patient developed spells of somnolence, during which patient would close her eyes but was difficult to arouse, brief in duratoin. No tonic/clonic activity, no rigidity, no starting spells, no focal weakness. Patient had another spell lasting 5-10 minutes and was given keppra 1000mg. Ct head/chest/abd/pelvis w/ contrast was obtained.   Ct head revealed probable dural based metastatic disease, largest 2 x 1.1 cm dural based lesion abutting left posterior cerebellar hemisphere w/ enhancement, and some potential dural thickening posterior apex abutting the posterior  left frontal lobe. Ct c/a/p revealed no pulmonary embolism, but showed significant diffuse interval progression of metastatic disease including progression of supraclavicular and cervical lymphadenopathy w/ shift of trachea to right, mediastinal adenopathy w/ narrowing of eleonora and bilateral mainstem bronchi, esophageal lumen compression; new small bilateral effusions w/ new multiple lung mets, worsening dz throughout the liver, splenic lesions, and large jeana masses seen throughout the abdomen and flattening of the inferior vena cava secondary to jeana mass; malignant lesion also abuts the wall of rectum anterioly. Was initiated on decadron 4mg iv q6h and mri brain w & w/o ordered. I had extensive discussion w/ patient and  at bedside and discussed results of the scans.   Due to rapid and diffuse progression of metastatic disease (including brain) we discussed the poor prognosis, and  (patient confused) stated that in event of cardiopulmonary arrest would not wish to pursue mechanical ventilation or cpr, as such patient DNR/DNI, however supporting otherwise at this point. Subsequent urinalysis revealed uti and rocephin was initiated. Palliative consulted for morning and Dr. Jensen consulted as well          Remainder of detailed HPI is as noted by APC and has been reviewed and/or edited by me for completeness.    Attending Physical Exam:  Constitutional: alert, confused, somewhat tangential speech, no distress  Psych:somewhat flat  HEENT:Ncat, oroph clear  Neck: neck supple, full range of motion  Neuro: face symmetric, speech clear (content tangential), oriented to person, place; right pupil slightly larger than left w/ both pupils reactive, eomi; moves all extremities, neck supple  Cardiac: Rrr; No pretibial pitting edema  Resp: Ctab, normal effort  GI: abd soft, nontender, obese  Skin: No extremity rash  Musculoskeletal/extremities: no cyanosis extremities; no significant ankle edema        Brief  Assessment/Plan :  See detailed assessment and plan developed with APC which I have reviewed and/or edited for completeness.        Admission Status: I believe that this patient meets inpatient status      Jack Flores MD  08/20/21                      Electronically signed by Jack Flores MD at 08/20/21 0225         Current Facility-Administered Medications   Medication Dose Route Frequency Provider Last Rate Last Admin   • busPIRone (BUSPAR) tablet 5 mg  5 mg Oral TID Jack Flores MD   5 mg at 08/23/21 1748   • castor oil-balsam peru (VENELEX) ointment   Topical Q12H Trang Holbrook MD   Given at 08/23/21 2043   • dexamethasone (DECADRON) injection 4 mg  4 mg Intravenous Q6H Jack Flores MD   4 mg at 08/24/21 0441   • dextrose (D50W) 25 g/ 50mL Intravenous Solution 25 g  25 g Intravenous Q15 Min PRN Jack Flores MD       • dextrose (GLUTOSE) oral gel 15 g  15 g Oral Q15 Min PRN Jack Flores MD       • docusate sodium (COLACE) capsule 200 mg  200 mg Oral Daily Perla Ambrose PA-C   200 mg at 08/22/21 0942   • gabapentin (NEURONTIN) capsule 300 mg  300 mg Oral TID Jack Flores MD   300 mg at 08/20/21 2112   • glucagon (human recombinant) (GLUCAGEN DIAGNOSTIC) injection 1 mg  1 mg Subcutaneous Q15 Min PRN Jack Flores MD       • insulin lispro (humaLOG) injection 0-14 Units  0-14 Units Subcutaneous 4x Daily With Meals & Nightly Trang Holbrook MD   5 Units at 08/23/21 2048   • levETIRAcetam in NaCl 0.75% (KEPPRA) IVPB 1,000 mg  1,000 mg Intravenous Q12H Jack Flores  mL/hr at 08/24/21 0519 1,000 mg at 08/24/21 0519   • levothyroxine (SYNTHROID, LEVOTHROID) tablet 250 mcg  250 mcg Oral Once per day on Mon Tue Wed Thu Fri Sat Perla Ambrose PA-C   250 mcg at 08/24/21 0519   • magnesium sulfate 4 gram infusion - Mg less than or equal to 1mg/dL  4 g Intravenous PRN Jack Flores MD        Or   • magnesium sulfate 3 gram  infusion (1gm x 3) - Mg 1.1 - 1.5 mg/dL  1 g Intravenous PRN Jack Flores MD        Or   • Magnesium Sulfate 2 gram infusion- Mg 1.6 - 1.9 mg/dL  2 g Intravenous PRN Jack Flores MD       • megestrol (MEGACE) tablet 80 mg  80 mg Oral BID Perla Ambrose PA-C   80 mg at 08/23/21 0928   • Morphine (MS CONTIN) 12 hr tablet 30 mg  30 mg Oral BID Perla Ambrose PA-C   30 mg at 08/23/21 2041   • morphine injection 2 mg  2 mg Intravenous Q4H PRN Trang Holbrook MD       • nystatin (MYCOSTATIN) 927432 UNIT/ML suspension 500,000 Units  5 mL Swish & Swallow 4x Daily Perla Ambrose PA-C   500,000 Units at 08/23/21 2041   • ondansetron (ZOFRAN) tablet 4 mg  4 mg Oral Q6H PRN Perla Ambrose PA-C        Or   • ondansetron (ZOFRAN) injection 4 mg  4 mg Intravenous Q6H PRN Perla Ambrose PA-C   4 mg at 08/20/21 1226   • oxyCODONE (ROXICODONE) immediate release tablet 10 mg  10 mg Oral Q6H PRN Perla Ambrose PA-C   10 mg at 08/24/21 0240   • pantoprazole (PROTONIX) EC tablet 40 mg  40 mg Oral BID AC Perla Ambrose PA-C   40 mg at 08/23/21 1748   • PARoxetine (PAXIL) tablet 20 mg  20 mg Oral Daily Jack Flores MD   20 mg at 08/23/21 0927   • polyethylene glycol (MIRALAX) packet 17 g  17 g Oral Daily Perla Ambrose PA-C   17 g at 08/22/21 0945   • senna (SENOKOT) tablet 2 tablet  2 tablet Oral Nightly Perla Ambrose PA-C   2 tablet at 08/21/21 2244   • sodium chloride 0.9 % flush 10 mL  10 mL Intravenous Q12H Perla Ambrose PA-C   10 mL at 08/23/21 2049   • sodium chloride 0.9 % flush 10 mL  10 mL Intravenous PRN Perla Ambrose PA-C       • sodium chloride 0.9 % infusion  75 mL/hr Intravenous Continuous Jack Flores MD 75 mL/hr at 08/23/21 1801 75 mL/hr at 08/23/21 1801     Lab Results (last 72 hours)     Procedure Component Value Units Date/Time    POC Glucose Once [[id number]]  (Abnormal) Collected: 08/24/21 5279     Specimen: Blood Updated: 08/24/21 0736     Glucose 192 mg/dL      Comment: Meter: [id number] : [id number] tesfaye firend       POC Glucose Once [[id number]]  (Abnormal) Collected: 08/23/21 1955    Specimen: Blood Updated: 08/23/21 1957     Glucose 228 mg/dL      Comment: Meter: [id number] : [id number] [facility]       POC Glucose Once [[id number]]  (Abnormal) Collected: 08/23/21 1704    Specimen: Blood Updated: 08/23/21 1706     Glucose 204 mg/dL      Comment: Meter: [id number] : [id number] Edamamtany       POC Glucose Once [[id number]]  (Abnormal) Collected: 08/23/21 1121    Specimen: Blood Updated: 08/23/21 1123     Glucose 214 mg/dL      Comment: Meter: [id number] : [id number] Speek Soledad       POC Glucose Once [[id number]]  (Abnormal) Collected: 08/23/21 0739    Specimen: Blood Updated: 08/23/21 0741     Glucose 195 mg/dL      Comment: Meter: [id number] : [id number] Granicus       CBC (No Diff) [[id number]]  (Abnormal) Collected: 08/23/21 0402    Specimen: Blood Updated: 08/23/21 0623     WBC 5.18 10*3/mm3      RBC 2.39 10*6/mm3      Hemoglobin 6.7 g/dL      Hematocrit 20.4 %      MCV 85.4 fL      MCH 28.0 pg      MCHC 32.8 g/dL      RDW 17.0 %      RDW-SD 53.1 fl      MPV 9.6 fL      Platelets 108 10*3/mm3     Narrative:      Verified by repeat analysis.      Basic Metabolic Panel [[id number]]  (Abnormal) Collected: 08/23/21 0402    Specimen: Blood Updated: 08/23/21 0614     Glucose 178 mg/dL      BUN 7 mg/dL      Creatinine 0.43 mg/dL      Sodium 126 mmol/L      Potassium 3.9 mmol/L      Chloride 91 mmol/L      CO2 27.0 mmol/L      Calcium 8.4 mg/dL      eGFR Non African Amer >150 mL/min/1.73      BUN/Creatinine Ratio 16.3     Anion Gap 8.0 mmol/L     Narrative:      GFR Normal >60  Chronic Kidney Disease <60  Kidney Failure <15      POC Glucose Once [[id number]]  (Abnormal) Collected: 08/22/21 2333    Specimen: Blood Updated: 08/22/21 2335     Glucose 201 mg/dL       Comment: Meter: [id number] : [id number] Connie Lois       POC Glucose Once [[id number]]  (Abnormal) Collected: 08/22/21 2000    Specimen: Blood Updated: 08/22/21 2001     Glucose 173 mg/dL      Comment: Meter: [id number] : [id number] Connie Lois       POC Glucose Once [[id number]]  (Abnormal) Collected: 08/22/21 1635    Specimen: Blood Updated: 08/22/21 1635     Glucose 153 mg/dL      Comment: Meter: [id number] : [id number] Tracy Soledad       POC Glucose Once [[id number]]  (Abnormal) Collected: 08/22/21 1148    Specimen: Blood Updated: 08/22/21 1149     Glucose 182 mg/dL      Comment: Meter: [id number] : [id number] Tracy Soledad       POC Glucose Once [[id number]]  (Abnormal) Collected: 08/22/21 0819    Specimen: Blood Updated: 08/22/21 0821     Glucose 172 mg/dL      Comment: Meter: [id number] : [id number] Tracy Soledad       POC Glucose Once [[id number]]  (Abnormal) Collected: 08/22/21 0541    Specimen: Blood Updated: 08/22/21 0543     Glucose 193 mg/dL      Comment: Meter: [id number] : [id number] Honorio JoyceConstanza       Basic Metabolic Panel [[id number]]  (Abnormal) Collected: 08/22/21 0319    Specimen: Blood Updated: 08/22/21 0427     Glucose 189 mg/dL      BUN 7 mg/dL      Creatinine 0.39 mg/dL      Sodium 127 mmol/L      Potassium 4.0 mmol/L      Chloride 90 mmol/L      CO2 28.0 mmol/L      Calcium 8.1 mg/dL      eGFR Non African Amer >150 mL/min/1.73      BUN/Creatinine Ratio 17.9     Anion Gap 9.0 mmol/L     Narrative:      GFR Normal >60  Chronic Kidney Disease <60  Kidney Failure <15      POC Glucose Once [[id number]]  (Abnormal) Collected: 08/22/21 0303    Specimen: Blood Updated: 08/22/21 0304     Glucose 198 mg/dL      Comment: Meter: [id number] : [id number] Honorio JoyceConstanza       Basic Metabolic Panel [[id number]]  (Abnormal) Collected: 08/22/21 0028    Specimen: Blood Updated: 08/22/21 0134     Glucose 188 mg/dL      BUN 7 mg/dL      Creatinine 0.40 mg/dL      Sodium 127  mmol/L      Potassium 3.9 mmol/L      Chloride 91 mmol/L      CO2 26.0 mmol/L      Calcium 8.3 mg/dL      eGFR Non African Amer >150 mL/min/1.73      BUN/Creatinine Ratio 17.5     Anion Gap 10.0 mmol/L     Narrative:      GFR Normal >60  Chronic Kidney Disease <60  Kidney Failure <15      POC Glucose Once [[id number]]  (Abnormal) Collected: 08/21/21 2348    Specimen: Blood Updated: 08/21/21 2357     Glucose 202 mg/dL      Comment: Meter: [id number] : [id number] Andujarcitlaly Licea       POC Glucose Once [[id number]]  (Abnormal) Collected: 08/21/21 2028    Specimen: Blood Updated: 08/21/21 2032     Glucose 176 mg/dL      Comment: Meter: [id number] : [id number] Honorio Apodaca       Basic Metabolic Panel [[id number]]  (Abnormal) Collected: 08/21/21 1957    Specimen: Blood Updated: 08/21/21 2024     Glucose 183 mg/dL      BUN 7 mg/dL      Creatinine 0.39 mg/dL      Sodium 127 mmol/L      Potassium 3.8 mmol/L      Chloride 91 mmol/L      CO2 28.0 mmol/L      Calcium 8.2 mg/dL      eGFR Non African Amer >150 mL/min/1.73      BUN/Creatinine Ratio 17.9     Anion Gap 8.0 mmol/L     Narrative:      GFR Normal >60  Chronic Kidney Disease <60  Kidney Failure <15      POC Glucose Once [[id number]]  (Abnormal) Collected: 08/21/21 1815    Specimen: Blood Updated: 08/21/21 1817     Glucose 215 mg/dL      Comment: Meter: [id number] : [id number] gloria gray       Basic Metabolic Panel [[id number]]  (Abnormal) Collected: 08/21/21 1546    Specimen: Blood Updated: 08/21/21 1621     Glucose 211 mg/dL      BUN 7 mg/dL      Creatinine 0.43 mg/dL      Sodium 127 mmol/L      Potassium 3.9 mmol/L      Chloride 90 mmol/L      CO2 27.0 mmol/L      Calcium 8.5 mg/dL      eGFR Non African Amer >150 mL/min/1.73      BUN/Creatinine Ratio 16.3     Anion Gap 10.0 mmol/L     Narrative:      GFR Normal >60  Chronic Kidney Disease <60  Kidney Failure <15      POC Glucose Once [[id number]]  (Abnormal) Collected: 08/21/21 1502    Specimen: Blood  "Updated: 21 1504     Glucose 241 mg/dL      Comment: Meter: [id number] : [id number] gloria gray                Physician Progress Notes (last 72 hours) (Notes from 21 through 21)      Trang Holbrook MD at 21 1443        Patient was extremely weak when using the bedside commode today. Discharge will be canceled today. She does not currently meet inpatient hospice criteria, and thus plan will be to arrange home with hospice tomorrow. Hospice liaison states ambulance has been arranged for 1600 tomorrow. Will defer any further concerns/questions about hospice care to the Hospice team.     Electronically signed by Trang NINA. MD Yusef, 21, 2:44 PM EDT.      Electronically signed by Trang Holbrook MD at 21 1445     Jolei Jensen MD at 21 1311               Gynecologic Oncology In-Patient Progress Note      Patient Name: Shannan Jha  : 1970   MRN: [id number]       Subjective   Patient not doing well today. Reports that she got up to use bedside commode and had significant weakness and dizziness. States that she is getting a unit of blood. Pain controlled on MS contin while in better but had \"terrible\" pain when trying to ambulate to bedside commode. She has basically been confined to the bed. Continues to have worsening issues with balance and dizziness. She is not stable on her feet. She is very overwhelmed about the prospect of discharge and doesn't think she will do well at home.    Review of Systems:   Review of Systems   Constitutional: Positive for activity change, appetite change and fatigue.   HENT: Positive for trouble swallowing.    Eyes: Positive for visual disturbance.   Respiratory: Positive for choking. Negative for shortness of breath.    Cardiovascular: Negative for chest pain and palpitations.   Gastrointestinal: Negative for abdominal pain.   Genitourinary: Positive for vaginal discharge.   Musculoskeletal: Negative for arthralgias " and myalgias.   Neurological: Positive for dizziness, weakness and light-headedness.   Hematological: Positive for adenopathy.        Objective     Physical Exam:  Vital Signs:   Vitals:    08/22/21 2145 08/23/21 0344 08/23/21 0849 08/23/21 1123   BP: 101/62 111/63  103/67   BP Location: Right arm Right arm  Left arm   Patient Position: Lying Lying  Lying   Pulse: 84 84 87    Resp: 16 14 18    Temp: 98.4 °F (36.9 °C) 98.3 °F (36.8 °C) 97.9 °F (36.6 °C)    TempSrc: Oral Oral Oral    SpO2: 96% 98% 99%    Weight:       Height:         BMI: Body mass index is 29.82 kg/m².   Weight:   Wt Readings from Last 3 Encounters:   08/20/21 91.6 kg (202 lb)   08/13/21 91.6 kg (202 lb)   08/12/21 91.6 kg (202 lb)     I&O: I/O last 3 completed shifts:  In: 3124.4 [P.O.:360; I.V.:2663.4; IV Piggyback:101]  Out: -     Physical Exam  Vitals and nursing note reviewed.   Constitutional:       Appearance: She is ill-appearing. She is not diaphoretic.   HENT:      Head: Normocephalic and atraumatic.      Nose: Nose normal.      Comments: Nasal cannula  Eyes:      General: No scleral icterus.        Right eye: No discharge.         Left eye: No discharge.   Cardiovascular:      Rate and Rhythm: Normal rate and regular rhythm.   Pulmonary:      Effort: Pulmonary effort is normal. No respiratory distress.   Genitourinary:     Comments: Deferred  Skin:     Comments: Flushing noted on right cheek   Neurological:      Mental Status: She is disoriented.      Comments: Occasional garbled speech         Inpatient Medications:   Scheduled Meds:busPIRone, 5 mg, Oral, TID  castor oil-balsam peru, , Topical, Q12H  cefTRIAXone, 1 g, Intravenous, Q24H  dexamethasone, 4 mg, Intravenous, Q6H  docusate sodium, 200 mg, Oral, Daily  gabapentin, 300 mg, Oral, TID  insulin lispro, 0-14 Units, Subcutaneous, 4x Daily With Meals & Nightly  levETIRAcetam, 1,000 mg, Intravenous, Q12H  levothyroxine, 250 mcg, Oral, Once per day on Mon Tue Wed Thu Fri Sat  megestrol,  80 mg, Oral, BID  Morphine, 30 mg, Oral, BID  nystatin, 5 mL, Swish & Swallow, 4x Daily  pantoprazole, 40 mg, Oral, BID AC  PARoxetine, 20 mg, Oral, Daily  polyethylene glycol, 17 g, Oral, Daily  senna, 2 tablet, Oral, Nightly  sodium chloride, 10 mL, Intravenous, Q12H      Continuous Infusions:sodium chloride, 75 mL/hr, Last Rate: 75 mL/hr (08/23/21 0541)      PRN Meds:.dextrose  •  dextrose  •  glucagon (human recombinant)  •  magnesium sulfate **OR** magnesium sulfate in D5W 1g/100mL (PREMIX) **OR** magnesium sulfate  •  Morphine  •  ondansetron **OR** ondansetron  •  oxyCODONE  •  sodium chloride      Results:   Lab Results   Component Value Date    WBC 5.18 08/23/2021    HGB 6.7 (C) 08/23/2021    HCT 20.4 (C) 08/23/2021    MCV 85.4 08/23/2021     (L) 08/23/2021       Lab Results   Component Value Date    GLUCOSE 178 (H) 08/23/2021    CALCIUM 8.4 (L) 08/23/2021     (L) 08/23/2021    K 3.9 08/23/2021    CO2 27.0 08/23/2021    CL 91 (L) 08/23/2021    BUN 7 08/23/2021    CREATININE 0.43 (L) 08/23/2021    EGFRIFNONA >150 08/23/2021    BCR 16.3 08/23/2021    ANIONGAP 8.0 08/23/2021        Assessment / Plan    This is a 51 y.o. female with rapidly progressive dedifferentiated uterine cancer. The patient reports that the plan was to give her blood and discharge her home to possibly set up home hospice once she is situated.  I have significant concerns about the patient's ability to function at home particularly without home hospice already being set up.  She reports terrible pain with ambulation today.  She continues to need significant assistance due to her dizziness and balance issues.  The patient is getting 1 unit of blood.  However, as I discussed with Shabnam from [facility] the patient has had approximately 10 units of blood in the past 2 months we never got above hemoglobin of 8.5.  At this point I think patient really has 2 options -set up home hospice prior to discharge versus  inpatient hospice.  I do think that the patient will continue to rapidly decline.  I also have concerns about Stephen's ability to manage the complex care that Shannan will need over the upcoming days to weeks.  I did discuss with Shannan as did Shabnam that potentially if she is able to be optimized in an inpatient hospice setting that she could potentially be discharged home with home hospice.  Stephen and Shannan feeling they would be more comfortable with inpatient hospice.  Shabnam will contact [facility] to see if they believe that Shannan would be a candidate for inpatient hospice. I will continue to follow.    Dispo: Inpatient versus home hospice    MIL Jensen MD  Gynecologic Oncology   Date: 21  Time: 13:15 EDT    Please note that portions of this note may have been completed with a voice recognition program.       Electronically signed by Jolie Jensen MD at 21 1416     Trang Holbrook MD at 21 1014              [facility]  PROGRESS NOTE    Patient Name: Shannan Jha  : 1970  MRN: [id number]    Date of Admission: 2021  Primary Care Physician: Maricel Quinteros MD    Subjective   Subjective     CC:  F/U FTT    HPI:  Patient seen this morning. No complaints. She did have some hypoxia overnight, her nasal cannula had fallen off. Recovered after nurse replaced it. No dyspnea this morning.  at bedside.    ROS:  CV-no chest pain, no palpitations  Resp-no cough, no dyspnea  GI-no N/V/D, no abd pain    All other systems reviewed and negative except any additional pertinent positives and negatives as discussed in HPI.       Objective   Objective     Vital Signs:   Temp:  [97.1 °F (36.2 °C)-98.6 °F (37 °C)] 97.1 °F (36.2 °C)  Heart Rate:  [76] 76  Resp:  [16] 16  BP: ()/(64-70) 107/70  Flow (L/min):  [2-3] 3     Physical Exam:  Gen-no acute distress, pale appearance  HENT-NCAT, mucous membranes moist  Resp-nonlabored on 3  liters  Neuro-A&Ox3 currently, no focal deficits  Psych-appropriate mood    Results Reviewed:  LAB RESULTS:      Lab 08/20/21  0439 08/19/21 1953 08/19/21 1749 08/19/21  1418   WBC 3.72  --  4.28 4.70   HEMOGLOBIN 7.3*  --  6.4* 6.4*   HEMATOCRIT 22.2*  --  18.9* 18.9*   PLATELETS 100*  --  135* 177   NEUTROS ABS  --   --  3.03 3.80   IMMATURE GRANS (ABS)  --   --  0.43*  --    LYMPHS ABS  --   --  0.41* 0.60*   MONOS ABS  --   --  0.35 0.30   EOS ABS  --   --  0.05  --    MCV 84.7  --  78.1* 80.7   LACTATE  --  2.0  --   --          Lab 08/22/21  0319 08/22/21  0028 08/21/21 1957 08/21/21  1546 08/21/21  1204 08/20/21  0824 08/20/21 0439 08/19/21 2202 08/19/21 2202 08/19/21 1749 08/19/21  1749   SODIUM 127* 127* 127* 127* 125*   < > 125*   < > 124*   < > 125*   POTASSIUM 4.0 3.9 3.8 3.9 4.0   < > 3.9   < > 3.9   < > 4.4   CHLORIDE 90* 91* 91* 90* 90*   < > 86*   < > 85*   < > 84*   CO2 28.0 26.0 28.0 27.0 22.0   < > 20.0*   < > 25.0   < > 23.0   ANION GAP 9.0 10.0 8.0 10.0 13.0   < > 19.0*   < > 14.0   < > 18.0*   BUN 7 7 7 7 7   < > 10   < > 11   < > 13   CREATININE 0.39* 0.40* 0.39* 0.43* 0.36*   < > 0.35*   < > 0.44*   < > 0.49*   GLUCOSE 189* 188* 183* 211* 215*   < > 273*   < > 285*   < > 362*   CALCIUM 8.1* 8.3* 8.2* 8.5* 8.3*   < > 8.7   < > 8.7   < > 9.4   IONIZED CALCIUM  --   --   --   --   --   --   --   --   --   --  1.22   MAGNESIUM  --   --   --   --   --   --   --   --  1.8  --  1.9   PHOSPHORUS  --   --   --   --   --   --   --   --   --   --  3.7   HEMOGLOBIN A1C  --   --   --   --   --   --   --   --   --   --  7.80*   TSH  --   --   --   --   --   --  18.690*  --   --   --   --     < > = values in this interval not displayed.         Lab 08/19/21  9237   TOTAL PROTEIN 6.5   ALBUMIN 3.10*   GLOBULIN 3.4   ALT (SGPT) 22   AST (SGOT) 86*   BILIRUBIN 1.4*   ALK PHOS 330*                 Lab 08/19/21  5754   ABO TYPING O   RH TYPING Positive   ANTIBODY SCREEN Negative         Brief Urine Lab  Results  (Last result in the past 365 days)      Color   Clarity   Blood   Leuk Est   Nitrite   Protein   CREAT   Urine HCG        08/20/21 0622 Yellow Clear Negative Trace Negative 30 mg/dL (1+)               Microbiology Results Abnormal     Procedure Component Value - Date/Time    Urine Culture - Urine, Urine, Catheter In/Out [[id number]]  (Normal) Collected: 08/19/21 2158    Lab Status: Final result Specimen: Urine, Catheter In/Out Updated: 08/20/21 2123     Urine Culture No growth    COVID PRE-OP / PRE-PROCEDURE SCREENING ORDER (NO ISOLATION) - Swab, Nasopharynx [[id number]]  (Normal) Collected: 08/19/21 2015    Lab Status: Final result Specimen: Swab from Nasopharynx Updated: 08/19/21 2034    Narrative:      The following orders were created for panel order COVID PRE-OP / PRE-PROCEDURE SCREENING ORDER (NO ISOLATION) - Swab, Nasopharynx.  Procedure                               Abnormality         Status                     ---------                               -----------         ------                     COVID-19, ABBOTT IN-HOUS...[[id number]]  Normal              Final result                 Please view results for these tests on the individual orders.    COVID-19, ABBOTT IN-HOUSE,NASAL Swab (NO TRANSPORT MEDIA) 2 HR TAT - Swab, Nasopharynx [[id number]]  (Normal) Collected: 08/19/21 2015    Lab Status: Final result Specimen: Swab from Nasopharynx Updated: 08/19/21 2034     COVID19 Presumptive Negative    Narrative:      Fact sheet for providers: https://www.fda.gov/media/276119/download     Fact sheet for patients: https://www.fda.gov/media/038763/download    Test performed by PCR.  If inconsistent with clinical signs and symptoms patient should be tested with different authorized molecular test.          FL Video Swallow With Speech Single Contrast    Result Date: 8/20/2021  EXAMINATION: FL VIDEO SWALLOW W SPEECH SINGLE-CONTRAST-, FL LIMITED UGI FOR MBS REFLUX SINGLE-CONTRAST-  INDICATION: dysphagia, known  esophageal displacement per imaging  TECHNIQUE: 1 minute and 18 seconds of fluoroscopic time was used. 8 associated fluoroscopic loops were saved. The patient was evaluated in the seated lateral position while taking a variety of consistencies of barium by mouth under the direction of speech pathology.  COMPARISON: NONE  FINDINGS: 1 minute and 18 seconds of fluoroscopy provided for a modified barium swallow, and limited upper GI series. Please see speech therapy report for full details and recommendations. There is a moderate sized filling defect seen in the area of the oral pharynx. This filling defect persists throughout multiple images, but does not appear to impede swallowing of barium, however further evaluation may be considered if clinically relevant. No focal esophageal strictures were seen. Gastroesophageal reflux was not demonstrated during this exam.       Impression: Fluoroscopy provided for a modified barium swallow, and limited upper GI series. Please see speech therapy report for full details and recommendations. Moderate sized filling defect seen in the area of the oropharynx, which does not appear to significantly impede swallowing of barium. Further evaluation may be considered if clinically relevant.    This report was finalized on 8/20/2021 4:31 PM by Dr. Jun Gonzales.      FL Limited Ugi For Mbs Reflux Single-Contrast    Result Date: 8/20/2021  EXAMINATION: FL VIDEO SWALLOW W SPEECH SINGLE-CONTRAST-, FL LIMITED UGI FOR MBS REFLUX SINGLE-CONTRAST-  INDICATION: dysphagia, known esophageal displacement per imaging  TECHNIQUE: 1 minute and 18 seconds of fluoroscopic time was used. 8 associated fluoroscopic loops were saved. The patient was evaluated in the seated lateral position while taking a variety of consistencies of barium by mouth under the direction of speech pathology.  COMPARISON: NONE  FINDINGS: 1 minute and 18 seconds of fluoroscopy provided for a modified barium swallow, and limited  upper GI series. Please see speech therapy report for full details and recommendations. There is a moderate sized filling defect seen in the area of the oral pharynx. This filling defect persists throughout multiple images, but does not appear to impede swallowing of barium, however further evaluation may be considered if clinically relevant. No focal esophageal strictures were seen. Gastroesophageal reflux was not demonstrated during this exam.       Impression: Fluoroscopy provided for a modified barium swallow, and limited upper GI series. Please see speech therapy report for full details and recommendations. Moderate sized filling defect seen in the area of the oropharynx, which does not appear to significantly impede swallowing of barium. Further evaluation may be considered if clinically relevant.    This report was finalized on 8/20/2021 4:31 PM by Dr. Jun Gonzales.            I have reviewed the medications:  Scheduled Meds:busPIRone, 5 mg, Oral, TID  castor oil-balsam peru, , Topical, Q12H  cefTRIAXone, 1 g, Intravenous, Q24H  dexamethasone, 4 mg, Intravenous, Q6H  docusate sodium, 200 mg, Oral, Daily  gabapentin, 300 mg, Oral, TID  insulin lispro, 0-14 Units, Subcutaneous, Q3H  levETIRAcetam, 1,000 mg, Intravenous, Q12H  levothyroxine, 250 mcg, Oral, Once per day on Mon Tue Wed Thu Fri Sat  megestrol, 80 mg, Oral, BID  Morphine, 30 mg, Oral, BID  nystatin, 5 mL, Swish & Swallow, 4x Daily  pantoprazole, 40 mg, Oral, BID AC  PARoxetine, 20 mg, Oral, Daily  polyethylene glycol, 17 g, Oral, Daily  senna, 2 tablet, Oral, Nightly  sodium chloride, 10 mL, Intravenous, Q12H      Continuous Infusions:sodium chloride, 75 mL/hr, Last Rate: 75 mL/hr (08/21/21 7811)      PRN Meds:.dextrose  •  dextrose  •  glucagon (human recombinant)  •  magnesium sulfate **OR** magnesium sulfate in D5W 1g/100mL (PREMIX) **OR** magnesium sulfate  •  Morphine  •  ondansetron **OR** ondansetron  •  oxyCODONE  •  sodium  "chloride    Assessment/Plan   Assessment & Plan     Active Hospital Problems    Diagnosis  POA   • **Endometrial cancer (CMS/HCC) [C54.1]  Yes   • Hyponatremia [E87.1]  Yes   • UTI (urinary tract infection), bacterial [N39.0, A49.9]  Yes   • Acute anemia [D64.9]  Yes   • Generalized weakness [R53.1]  Yes   • Endometrial cancer, FIGO stage IVB (CMS/HCC) [C54.1]  Yes   • Drug induced constipation [K59.03]  Yes   • Cancer related pain [G89.3]  Yes   • Dehydration [E86.0]  Yes   • Type 2 diabetes mellitus (CMS/HCC) [E11.9]  Yes      Resolved Hospital Problems   No resolved problems to display.        Brief Hospital Course to date:  Shannan Jha is a 51 y.o. female with hx of HTN, HLD, DM2, hypothyroidism, and recently diagnosed metastatic endometrial cancer in May 2021, undergoing treatment with Dr. Jensen with Paclitaxel/Carboplatin, who presents from Dr. Jensen's office due to worsening weakness, decreased oral intake, pain, and lab work showing worsening anemia. Admitted to hospitalist service. Further lab work revealed hyponatremia with Na 125 along with UTI. CT head/chest/abd/pelvis revealed new brain metastases as well as interval progression of metastatic disease throughout the chest, abdomen, and pelvis.    Metastatic endometrial cancer, rapidly progressive  Encephalopathy/confusion  Malignancy-related pain  --CT scans suggesting new brain mets, lung mets, adenopathy with mainstem bronchi and tracheal displacement, diffuse liver disease, and adenopathy displacing the inferior vena cava.  --Patient refused MRI brain.  --Continue empiric Keppra.  --Continue Decadron.  --Dr. Jensen consulted. Has discussed with patient and family. They would like to go home with hospice.  --Palliative Care following.  --Hospice met with family today, now family wants to just go home and call hospice when they are \"ready.\" I think we need to have another discussion tomorrow and work with case management to arrange for home " equipment including hospital bed.    Anemia  --Likely related to chemotherapy.  --Transfused 2 units PRBC this admission.    Hyponatremia  --Likely secondary to volume depletion/poor oral intake.  --Gentle IV fluids.  --Improved.     UTI  --s/p Rocephin x 3 days.  --Urine culture negative.    DM2 with hyperglycemia  --HbA1c 7.8%.  --SSI.    Thrush  --Nystatin swish and swallow.    DVT prophylaxis:  Mechanical DVT prophylaxis orders are present.       AM-PAC 6 Clicks Score (PT): 15 (21)    Disposition: I expect the patient to be discharged home ~1-2 days, possibly with hospice     CODE STATUS:   Code Status and Medical Interventions:   Ordered at: 21 0772     Limited Support to NOT Include:    Intubation    Cardioversion/Defibrillation     Code Status:    No CPR     Medical Interventions (Level of Support Prior to Arrest):    Limited       Trang Holbrook MD  21                Electronically signed by Trang Holbrook MD at 21 1017     Jolie Jensen MD at 21 1311               Gynecologic Oncology In-Patient Progress Note      Patient Name: Shannan Jha  : 1970   MRN: [id number]       Subjective   Patient and  doing okay today. Patient reports pain very well managed since inpatient. Denies any problems with hip and neck pain. Swallowing remains the same. Patient reports food is actually easier to swallow. Pills are most difficult.  reports abnormal arm movements while patient is sleeping. Patient also now reporting a week long history of vaginal discharge but is mostly unconcerned about this.     Review of Systems:   Review of Systems   Constitutional: Positive for activity change, appetite change and fatigue.   HENT: Positive for trouble swallowing.    Eyes: Positive for visual disturbance.   Respiratory: Positive for choking. Negative for shortness of breath.    Cardiovascular: Negative for chest pain and palpitations.   Gastrointestinal: Negative for  "abdominal pain.   Genitourinary: Positive for vaginal discharge.   Musculoskeletal: Negative for arthralgias and myalgias.   Neurological: Positive for dizziness, weakness and light-headedness.   Hematological: Positive for adenopathy.        Objective     Physical Exam:  Vital Signs:   Vitals:    08/20/21 1217 08/20/21 1925 08/20/21 2358 08/21/21 1008   BP: 110/78 105/67 109/66 110/67   BP Location: Left arm Left arm  Left arm   Patient Position: Sitting Sitting  Sitting   Pulse: 99 89 93 86   Resp: 16 16  16   Temp: 98 °F (36.7 °C) 98.2 °F (36.8 °C)  98.8 °F (37.1 °C)   TempSrc: Oral Oral  Axillary   SpO2: 96% 93% 92% 96%   Weight:  91.6 kg (202 lb)     Height:  175.3 cm (69.02\")       BMI: Body mass index is 29.82 kg/m².   Weight:   Wt Readings from Last 3 Encounters:   08/20/21 91.6 kg (202 lb)   08/13/21 91.6 kg (202 lb)   08/12/21 91.6 kg (202 lb)     I&O: I/O last 3 completed shifts:  In: 3478 [I.V.:2814; Blood:564; IV Piggyback:100]  Out: 930 [Urine:930]    Physical Exam  Vitals and nursing note reviewed.   Constitutional:       Appearance: She is ill-appearing. She is not diaphoretic.   HENT:      Head: Normocephalic and atraumatic.      Nose: Nose normal.      Comments: Nasal cannula  Eyes:      General: No scleral icterus.        Right eye: No discharge.         Left eye: No discharge.   Cardiovascular:      Rate and Rhythm: Normal rate and regular rhythm.   Pulmonary:      Effort: Pulmonary effort is normal. No respiratory distress.   Genitourinary:     Comments: Deferred  Skin:     Comments: Flushing noted on right cheek   Neurological:      Mental Status: She is disoriented.      Comments: Occasional garbled speech         Inpatient Medications:   Scheduled Meds:busPIRone, 5 mg, Oral, TID  castor oil-balsam peru, , Topical, Q12H  cefTRIAXone, 1 g, Intravenous, Q24H  dexamethasone, 4 mg, Intravenous, Q6H  docusate sodium, 200 mg, Oral, Daily  gabapentin, 300 mg, Oral, TID  insulin lispro, 0-14 Units, " Subcutaneous, Q3H  levETIRAcetam, 1,000 mg, Intravenous, Q12H  levothyroxine, 250 mcg, Oral, Once per day on Mon Tue Wed Thu Fri Sat  megestrol, 80 mg, Oral, BID  Morphine, 30 mg, Oral, BID  nystatin, 5 mL, Swish & Swallow, 4x Daily  pantoprazole, 40 mg, Oral, BID AC  PARoxetine, 20 mg, Oral, Daily  polyethylene glycol, 17 g, Oral, Daily  senna, 2 tablet, Oral, Nightly  sodium chloride, 10 mL, Intravenous, Q12H      Continuous Infusions:sodium chloride, 75 mL/hr, Last Rate: 75 mL/hr (08/21/21 0253)      PRN Meds:.dextrose  •  dextrose  •  glucagon (human recombinant)  •  magnesium sulfate **OR** magnesium sulfate in D5W 1g/100mL (PREMIX) **OR** magnesium sulfate  •  Morphine  •  ondansetron **OR** ondansetron  •  oxyCODONE  •  sodium chloride      Results:   Lab Results   Component Value Date    WBC 3.72 08/20/2021    HGB 7.3 (L) 08/20/2021    HCT 22.2 (L) 08/20/2021    MCV 84.7 08/20/2021     (L) 08/20/2021       Lab Results   Component Value Date    GLUCOSE 215 (H) 08/21/2021    CALCIUM 8.3 (L) 08/21/2021     (L) 08/21/2021    K 4.0 08/21/2021    CO2 22.0 08/21/2021    CL 90 (L) 08/21/2021    BUN 7 08/21/2021    CREATININE 0.36 (L) 08/21/2021    EGFRIFNONA >150 08/21/2021    BCR 19.4 08/21/2021    ANIONGAP 13.0 08/21/2021        Assessment / Plan    This is a 51 y.o. female with rapidly progressive dedifferentiated uterine cancer. Symptoms currently being managed very well. Family to meet with home hospice today. Discussed patient's new complaints of vaginal discharge. This is likely due to patients progressive disease. Do not recommend pelvic exam as this would not add anything to current plan. I will continue to be available to Shannan and Humberto as needed.    Dispo: With home hospice    MIL Jensen MD  Gynecologic Oncology   Date: 08/21/21  Time: 13:15 EDT    Please note that portions of this note may have been completed with a voice recognition program.       Electronically signed by Camila,  Jolie SAMUEL MD at 08/21/21 1325       Consult Notes (last 72 hours) (Notes from 08/21/21 0927 through 08/24/21 0927)    No notes of this type exist for this encounter.

## 2021-08-24 NOTE — NURSING NOTE
WOCN follow up for: stage 3 coccyx    Assessment and Care provided: stage 3 on coccyx now measures 2x2.5x0.3 cm.  The wound bed is starting to fausto.  Granulation tissue visible.  Cleansed and venelex applied.   Continue with current plan of care.    Recommendation(s):    Continue with current plan of care. See WOCN orders.  *Continue to maintain good skin care, keep dry, turn regularly, keep heels elevated and offloaded with heel boots.    *Apply z-guard to bottom and bony prominences daily and as needed with incontinence episodes.  *Follow C.A.R.E protocol if medical devices (Bipap, coffman, Ng tube, etc) are being used.     All skin interventions are in place. Heels and bottom are blanchable, intact and offloaded. Discussed with RN.    WOCN will continue to follow.  Please contact with questions or if needs arise.

## 2021-08-24 NOTE — PROGRESS NOTES
[facility] Medicine Services  PROGRESS NOTE    Patient Name: Shannan Jha  : 1970  MRN: [id number]    Date of Admission: 2021  Primary Care Physician: Maricel Quinteros MD    Subjective   Subjective     CC:  F/U FTT    HPI:  Patient seen this morning. No major complaints. Still feels tired even after blood transfusion. Plan is home with hospice later this afternoon via ambulance.    ROS:  CV-no chest pain, no palpitations  Resp-no cough, no dyspnea  GI-no N/V/D, no abd pain    All other systems reviewed and negative except any additional pertinent positives and negatives as discussed in HPI.       Objective   Objective     Vital Signs:   Temp:  [97.2 °F (36.2 °C)-98.4 °F (36.9 °C)] 98.4 °F (36.9 °C)  Heart Rate:  [85-95] 95  Resp:  [12-16] 12  BP: (103-128)/(67-82) 124/74  Flow (L/min):  [3] 3     Physical Exam:  Gen-no acute distress, pale appearance  HENT-NCAT, mucous membranes moist  Resp-nonlabored on 3 liters  Neuro-A&Ox3 currently, no focal deficits  Psych-appropriate mood    Results Reviewed:  LAB RESULTS:      Lab 21  0402 21  0439 21  1953 08/19/21  1749 21  1418   WBC 5.18 3.72  --  4.28 4.70   HEMOGLOBIN 6.7* 7.3*  --  6.4* 6.4*   HEMATOCRIT 20.4* 22.2*  --  18.9* 18.9*   PLATELETS 108* 100*  --  135* 177   NEUTROS ABS  --   --   --  3.03 3.80   IMMATURE GRANS (ABS)  --   --   --  0.43*  --    LYMPHS ABS  --   --   --  0.41* 0.60*   MONOS ABS  --   --   --  0.35 0.30   EOS ABS  --   --   --  0.05  --    MCV 85.4 84.7  --  78.1* 80.7   LACTATE  --   --  2.0  --   --          Lab 21  0402 21  0319 21  0028 21  1957 21  1546 21  0824 21  0439 21  2202 21  1749 21  1749   SODIUM 126* 127* 127* 127* 127*   < > 125*   < > 124*   < > 125*   POTASSIUM 3.9 4.0 3.9 3.8 3.9   < > 3.9   < > 3.9   < > 4.4   CHLORIDE 91* 90* 91* 91* 90*   < > 86*   < > 85*   < > 84*   CO2 27.0  28.0 26.0 28.0 27.0   < > 20.0*   < > 25.0   < > 23.0   ANION GAP 8.0 9.0 10.0 8.0 10.0   < > 19.0*   < > 14.0   < > 18.0*   BUN 7 7 7 7 7   < > 10   < > 11   < > 13   CREATININE 0.43* 0.39* 0.40* 0.39* 0.43*   < > 0.35*   < > 0.44*   < > 0.49*   GLUCOSE 178* 189* 188* 183* 211*   < > 273*   < > 285*   < > 362*   CALCIUM 8.4* 8.1* 8.3* 8.2* 8.5*   < > 8.7   < > 8.7   < > 9.4   IONIZED CALCIUM  --   --   --   --   --   --   --   --   --   --  1.22   MAGNESIUM  --   --   --   --   --   --   --   --  1.8  --  1.9   PHOSPHORUS  --   --   --   --   --   --   --   --   --   --  3.7   HEMOGLOBIN A1C  --   --   --   --   --   --   --   --   --   --  7.80*   TSH  --   --   --   --   --   --  18.690*  --   --   --   --     < > = values in this interval not displayed.         Lab 08/19/21 1749   TOTAL PROTEIN 6.5   ALBUMIN 3.10*   GLOBULIN 3.4   ALT (SGPT) 22   AST (SGOT) 86*   BILIRUBIN 1.4*   ALK PHOS 330*                 Lab 08/23/21  0654 08/19/21  1749 08/19/21  1749   ABO TYPING O   < > O   RH TYPING Positive   < > Positive   ANTIBODY SCREEN Negative  --  Negative    < > = values in this interval not displayed.         Brief Urine Lab Results  (Last result in the past 365 days)      Color   Clarity   Blood   Leuk Est   Nitrite   Protein   CREAT   Urine HCG        08/20/21 0622 Yellow Clear Negative Trace Negative 30 mg/dL (1+)               Microbiology Results Abnormal     Procedure Component Value - Date/Time    Urine Culture - Urine, Urine, Catheter In/Out [[id number]]  (Normal) Collected: 08/19/21 2158    Lab Status: Final result Specimen: Urine, Catheter In/Out Updated: 08/20/21 2123     Urine Culture No growth    COVID PRE-OP / PRE-PROCEDURE SCREENING ORDER (NO ISOLATION) - Swab, Nasopharynx [[id number]]  (Normal) Collected: 08/19/21 2015    Lab Status: Final result Specimen: Swab from Nasopharynx Updated: 08/19/21 2034    Narrative:      The following orders were created for panel order COVID PRE-OP / PRE-PROCEDURE  SCREENING ORDER (NO ISOLATION) - Swab, Nasopharynx.  Procedure                               Abnormality         Status                     ---------                               -----------         ------                     COVID-19, ABBOTT IN-HOUS...[[id number]]  Normal              Final result                 Please view results for these tests on the individual orders.    COVID-19, ABBOTT IN-HOUSE,NASAL Swab (NO TRANSPORT MEDIA) 2 HR TAT - Swab, Nasopharynx [[id number]]  (Normal) Collected: 08/19/21 2015    Lab Status: Final result Specimen: Swab from Nasopharynx Updated: 08/19/21 2034     COVID19 Presumptive Negative    Narrative:      Fact sheet for providers: https://www.fda.gov/media/199765/download     Fact sheet for patients: https://www.fda.gov/media/945752/download    Test performed by PCR.  If inconsistent with clinical signs and symptoms patient should be tested with different authorized molecular test.          No radiology results from the last 24 hrs        I have reviewed the medications:  Scheduled Meds:busPIRone, 5 mg, Oral, TID  castor oil-balsam peru, , Topical, Q12H  dexamethasone, 4 mg, Intravenous, Q6H  docusate sodium, 200 mg, Oral, Daily  gabapentin, 300 mg, Oral, TID  insulin lispro, 0-14 Units, Subcutaneous, 4x Daily With Meals & Nightly  levETIRAcetam, 1,000 mg, Intravenous, Q12H  levothyroxine, 250 mcg, Oral, Once per day on Mon Tue Wed Thu Fri Sat  megestrol, 80 mg, Oral, BID  Morphine, 30 mg, Oral, BID  nystatin, 5 mL, Swish & Swallow, 4x Daily  pantoprazole, 40 mg, Oral, BID AC  PARoxetine, 20 mg, Oral, Daily  polyethylene glycol, 17 g, Oral, Daily  senna, 2 tablet, Oral, Nightly  sodium chloride, 10 mL, Intravenous, Q12H      Continuous Infusions:sodium chloride, 75 mL/hr, Last Rate: 75 mL/hr (08/23/21 1801)      PRN Meds:.dextrose  •  dextrose  •  glucagon (human recombinant)  •  magnesium sulfate **OR** magnesium sulfate in D5W 1g/100mL (PREMIX) **OR** magnesium sulfate  •   Morphine  •  ondansetron **OR** ondansetron  •  oxyCODONE  •  sodium chloride    Assessment/Plan   Assessment & Plan     Active Hospital Problems    Diagnosis  POA   • **Endometrial cancer (CMS/HCC) [C54.1]  Yes   • Severe malnutrition (CMS/HCC) [E43]  Yes   • Hyponatremia [E87.1]  Yes   • Acute anemia [D64.9]  Yes   • Generalized weakness [R53.1]  Yes   • Endometrial cancer, FIGO stage IVB (CMS/HCC) [C54.1]  Yes   • Drug induced constipation [K59.03]  Yes   • Cancer related pain [G89.3]  Yes   • Dehydration [E86.0]  Yes   • Type 2 diabetes mellitus (CMS/HCC) [E11.9]  Yes      Resolved Hospital Problems    Diagnosis Date Resolved POA   • UTI (urinary tract infection), bacterial [N39.0, A49.9] 08/23/2021 Yes        Brief Hospital Course to date:  Shannan Jha is a 51 y.o. female with hx of HTN, HLD, DM2, hypothyroidism, and recently diagnosed metastatic endometrial cancer in May 2021, undergoing treatment with Dr. Jensen with Paclitaxel/Carboplatin, who presents from Dr. Jensen's office due to worsening weakness, decreased oral intake, pain, and lab work showing worsening anemia. Admitted to hospitalist service. Further lab work revealed hyponatremia with Na 125 along with UTI. CT head/chest/abd/pelvis revealed new brain metastases as well as interval progression of metastatic disease throughout the chest, abdomen, and pelvis.    Metastatic endometrial cancer, rapidly progressive  Encephalopathy/confusion  Malignancy-related pain  --CT scans suggesting new brain mets, lung mets, adenopathy with mainstem bronchi and tracheal displacement, diffuse liver disease, and adenopathy displacing the inferior vena cava.  --Patient refused MRI brain.  --Continue empiric Keppra.  --Continue Decadron.  --Dr. Jensen consulted. Has discussed with patient and family. They would like to go home with hospice.  --Palliative Care following.  --Plan is home with hospice today via ambulance.    Anemia  --Likely related to  chemotherapy.  --Transfused 3 units PRBC this admission (last received 1 unit on 8/23/21).    Hyponatremia  --Likely secondary to volume depletion/poor oral intake.  --Gentle IV fluids.  --Improved.     UTI  --s/p Rocephin x 3 days.  --Urine culture negative.    DM2 with hyperglycemia  --HbA1c 7.8%.  --SSI.    Thrush  --Nystatin swish and swallow.    DVT prophylaxis:  Mechanical DVT prophylaxis orders are present.       AM-PAC 6 Clicks Score (PT): 15 (08/23/21 2000)    Disposition: I expect the patient to be discharged home today with hospice    CODE STATUS:   Code Status and Medical Interventions:   Ordered at: 08/20/21 0747     Limited Support to NOT Include:    Intubation    Cardioversion/Defibrillation     Code Status:    No CPR     Medical Interventions (Level of Support Prior to Arrest):    Limited       Trang Holbrook MD  08/24/21

## 2021-08-24 NOTE — PLAN OF CARE
Goal Outcome Evaluation:  Plan of Care Reviewed With: patient        Progress: no change  Outcome Summary: Plan for discharge home today with hospice with [facility]; ambulance scheduled for 1600.    1300 Palliative Team Conference: PIA Limon RN, [facility]; VERN Kwok DO; MAKAYLA Nelson; MIL Foy, [facility], [facility]; JOSELIN Whatley, W    Problem: Palliative Care  Goal: Enhanced Quality of Life  Outcome: Adequate for Care Transition

## 2021-08-24 NOTE — DISCHARGE INSTR - OTHER ORDERS
Please remember to call [facility] when you arrive home so that the nurse can come to complete your admission paperwork. Thank you.

## 2021-08-25 NOTE — DISCHARGE PLACEMENT REQUEST
"Shannan Bruno (51 y.o. Female)   D/C summary    Date of Birth Social Security Number Address Home Phone MRN    1970  [address] [address] [phone] [id number]    Rastafari Marital Status          Congregation        Admission Date Admission Type Admitting Provider Attending Provider Department, Room/Bed    21 Urgent Trang Holbrook MD  [address], S576/1    Discharge Date Discharge Disposition Discharge Destination        2021 Home or Self Care              Attending Provider: (none)   Allergies: Codeine    Isolation: None   Infection: None   Code Status: Prior    Ht: 175.3 cm (69.02\")   Wt: 91.6 kg (202 lb)    Admission Cmt: None   Principal Problem: Endometrial cancer (CMS/HCC) [C54.1]                 Active Insurance as of 2021     Primary Coverage     Payor Plan Insurance Group Employer/Plan Group    [employer]HEM BLUE CROSS ANTHEM BLUE CROSS BLUE SHIELD PPO [id number]     Payor Plan Address Payor Plan Phone Number Payor Plan Fax Number Effective Dates    [address] [phone]  2020 - None Entered    [place] [address]       Subscriber Name Subscriber Birth Date Member ID       MAYA BRUNO [birth year]1965 [id number]                 Emergency Contacts      (Rel.) Home Phone Work Phone Mobile Phone    MAYA BRUNO (Spouse) [phone] -- [phone]                 Discharge Summary      Trang Holbrook MD at 21 1108              [facility] Medicine Services  DISCHARGE SUMMARY    Patient Name: Shannan Bruno  : 1970  MRN: [id number]    Date of Admission: 2021  3:29 PM  Date of Discharge:  21  Primary Care Physician: Maricel Quinteros MD    Consults     Date and Time Order Name Status Description    2021 12:34 AM Inpatient Gynecologic Oncology Consult      2021  5:09 PM Inpatient Palliative Care MD Consult Completed           Hospital Course     Presenting Problem: "   Endometrial cancer, FIGO stage IVB (CMS/HCC) [C54.1]    Active Hospital Problems    Diagnosis  POA   • **Endometrial cancer (CMS/HCC) [C54.1]  Yes   • Severe malnutrition (CMS/HCC) [E43]  Yes   • Hyponatremia [E87.1]  Yes   • Acute anemia [D64.9]  Yes   • Generalized weakness [R53.1]  Yes   • Endometrial cancer, FIGO stage IVB (CMS/HCC) [C54.1]  Yes   • Drug induced constipation [K59.03]  Yes   • Cancer related pain [G89.3]  Yes   • Dehydration [E86.0]  Yes   • Type 2 diabetes mellitus (CMS/HCC) [E11.9]  Yes      Resolved Hospital Problems    Diagnosis Date Resolved POA   • UTI (urinary tract infection), bacterial [N39.0, A49.9] 08/23/2021 Yes          Hospital Course:  Shannan Jha is a 51 y.o. female with hx of HTN, HLD, DM2, hypothyroidism, and recently diagnosed metastatic endometrial cancer in May 2021, undergoing treatment with Dr. Jensen with Paclitaxel/Carboplatin, who presents from Dr. Jensen's office due to worsening weakness, decreased oral intake, pain, and lab work showing worsening anemia. Admitted to hospitalist service. Further lab work revealed hyponatremia with Na 125 along with UTI. CT head/chest/abd/pelvis revealed new brain metastases as well as interval progression of metastatic disease throughout the chest, abdomen, and pelvis. After further discussion with Dr. Jensen, patient and family have decided to go home and forego further treatment. They have been seen by Hospice.     Metastatic endometrial cancer, rapidly progressive  Encephalopathy/confusion  Malignancy-related pain  --CT scans suggesting new brain mets, lung mets, adenopathy with mainstem bronchi and tracheal displacement, diffuse liver disease, and adenopathy displacing the inferior vena cava.  --Patient refused MRI brain.  --Continue empiric Keppra.  --Continue Decadron.  --Dr. Jensen consulted. Has discussed with patient and family. They would like to go home with hospice.  --Hospice met with family 8/22/21. Plan is home with  hospice today via ambulance.     Anemia  --Likely related to chemotherapy.  --Transfused 2 units PRBC this admission.  --Anemia worsening again today. Discussed risks and benefits of blood transfusion especially in end stage cancer, and patient agreed to no further blood transfusions--HOWEVER later on nurse paged me and stated patient felt weak and was requesting a blood transfusion prior to discharge. Transfused another 1 unit PRBC on 8/23/21.     Hyponatremia  --Likely secondary to volume depletion/poor oral intake.  --Gentle IV fluids.  --Stable.     UTI  --s/p Rocephin x 3 days.  --Urine culture negative.     DM2 with hyperglycemia  --HbA1c 7.8%.     Thrush  --Nystatin swish and swallow.     Discharge patient home with hospice today (remained in hospital one more day for blood transfusion and further discussion with hospice). Sending home on Decadron and Keppra for the time being, defer to Hospice regarding when to stop these meds. Will also give 3 days of PRN Percocet as it appears she may be out of this medication. She can call Hospice for future refills.      Discharge Follow Up Recommendations for outpatient labs/diagnostics:  F/U with Dr. Jensen and Hospice as needed    Day of Discharge     HPI:   Patient seen this morning. No complaints. Rested well. Eager for discharge home.     Review of Systems  Gen-no fevers, no chills  CV-no chest pain, no palpitations  Resp-no cough, no dyspnea  GI-no N/V/D, no abd pain     All other systems reviewed and negative except any additional pertinent positives and negatives as discussed in HPI.    Vital Signs:   Temp:  [97.2 °F (36.2 °C)-98.4 °F (36.9 °C)] 97.9 °F (36.6 °C)  Heart Rate:  [82-87] 87  Resp:  [14-18] 18  BP: (101-111)/(62-63) 111/63     Physical Exam:  Gen-no acute distress, pale appearance  HENT-NCAT, mucous membranes moist  Resp-nonlabored on 3 liters  Abd-soft, NT, ND, +BS  Neuro-A&Ox3, no focal deficits  Skin-no rashes  Psych-appropriate mood    Pertinent   and/or Most Recent Results     LAB RESULTS:      Lab 08/23/21  0402 08/20/21  0439 08/19/21 1953 08/19/21  1749 08/19/21  1418   WBC 5.18 3.72  --  4.28 4.70   HEMOGLOBIN 6.7* 7.3*  --  6.4* 6.4*   HEMATOCRIT 20.4* 22.2*  --  18.9* 18.9*   PLATELETS 108* 100*  --  135* 177   NEUTROS ABS  --   --   --  3.03 3.80   IMMATURE GRANS (ABS)  --   --   --  0.43*  --    LYMPHS ABS  --   --   --  0.41* 0.60*   MONOS ABS  --   --   --  0.35 0.30   EOS ABS  --   --   --  0.05  --    MCV 85.4 84.7  --  78.1* 80.7   LACTATE  --   --  2.0  --   --          Lab 08/23/21  0402 08/22/21 0319 08/22/21  0028 08/21/21 1957 08/21/21  1546 08/20/21  0824 08/20/21 0439 08/19/21 2202 08/19/21 2202 08/19/21 1749 08/19/21 1749   SODIUM 126* 127* 127* 127* 127*   < > 125*   < > 124*   < > 125*   POTASSIUM 3.9 4.0 3.9 3.8 3.9   < > 3.9   < > 3.9   < > 4.4   CHLORIDE 91* 90* 91* 91* 90*   < > 86*   < > 85*   < > 84*   CO2 27.0 28.0 26.0 28.0 27.0   < > 20.0*   < > 25.0   < > 23.0   ANION GAP 8.0 9.0 10.0 8.0 10.0   < > 19.0*   < > 14.0   < > 18.0*   BUN 7 7 7 7 7   < > 10   < > 11   < > 13   CREATININE 0.43* 0.39* 0.40* 0.39* 0.43*   < > 0.35*   < > 0.44*   < > 0.49*   GLUCOSE 178* 189* 188* 183* 211*   < > 273*   < > 285*   < > 362*   CALCIUM 8.4* 8.1* 8.3* 8.2* 8.5*   < > 8.7   < > 8.7   < > 9.4   IONIZED CALCIUM  --   --   --   --   --   --   --   --   --   --  1.22   MAGNESIUM  --   --   --   --   --   --   --   --  1.8  --  1.9   PHOSPHORUS  --   --   --   --   --   --   --   --   --   --  3.7   HEMOGLOBIN A1C  --   --   --   --   --   --   --   --   --   --  7.80*   TSH  --   --   --   --   --   --  18.690*  --   --   --   --     < > = values in this interval not displayed.         Lab 08/19/21 1749   TOTAL PROTEIN 6.5   ALBUMIN 3.10*   GLOBULIN 3.4   ALT (SGPT) 22   AST (SGOT) 86*   BILIRUBIN 1.4*   ALK PHOS 330*                 Lab 08/23/21  0654 08/19/21 1749 08/19/21 1749   ABO TYPING O   < > O   RH TYPING Positive   < >  Positive   ANTIBODY SCREEN Negative  --  Negative    < > = values in this interval not displayed.         Brief Urine Lab Results  (Last result in the past 365 days)      Color   Clarity   Blood   Leuk Est   Nitrite   Protein   CREAT   Urine HCG        08/20/21 0622 Yellow Clear Negative Trace Negative 30 mg/dL (1+)             Microbiology Results (last 10 days)     Procedure Component Value - Date/Time    Urine Culture - Urine, Urine, Catheter In/Out [[id number]]  (Normal) Collected: 08/19/21 2158    Lab Status: Final result Specimen: Urine, Catheter In/Out Updated: 08/20/21 2123     Urine Culture No growth    COVID PRE-OP / PRE-PROCEDURE SCREENING ORDER (NO ISOLATION) - Swab, Nasopharynx [[id number]]  (Normal) Collected: 08/19/21 2015    Lab Status: Final result Specimen: Swab from Nasopharynx Updated: 08/19/21 2034    Narrative:      The following orders were created for panel order COVID PRE-OP / PRE-PROCEDURE SCREENING ORDER (NO ISOLATION) - Swab, Nasopharynx.  Procedure                               Abnormality         Status                     ---------                               -----------         ------                     COVID-19, ABBOTT IN-HOUS...[[id number]]  Normal              Final result                 Please view results for these tests on the individual orders.    COVID-19, ABBOTT IN-HOUSE,NASAL Swab (NO TRANSPORT MEDIA) 2 HR TAT - Swab, Nasopharynx [[id number]]  (Normal) Collected: 08/19/21 2015    Lab Status: Final result Specimen: Swab from Nasopharynx Updated: 08/19/21 2034     COVID19 Presumptive Negative    Narrative:      Fact sheet for providers: https://www.fda.gov/media/827279/download     Fact sheet for patients: https://www.fda.gov/media/421349/download    Test performed by PCR.  If inconsistent with clinical signs and symptoms patient should be tested with different authorized molecular test.          CT Head With & Without Contrast    Result Date: 8/19/2021  HISTORY: Mental status  change, metastatic cancer TECHNIQUE: Axial head CT with and without IV contrast. Radiation dose reduction techniques included automated exposure control or exposure modulation based on body size. Count of known CT and cardiac nuc med studies performed in previous 12 months: 0. COMPARISON: None. FINDINGS: Prominently dural based lesion is seen abutting the left posterior cerebellar hemisphere measuring approximately 2 x 1.1 cm that demonstrates enhancement. There also appears to be some potential dural thickening at the posterior apex abutting the posterior left frontal lobe, although this is somewhat equivocal. There is no definite large intraparenchymal lesion although subtle smaller lesions cannot be entirely excluded. There is no definite osseous abnormality. The sphenoid sinuses are opacified.     Overall, the findings are concerning for potential dural based metastases. The largest abuts the posterior left cerebellum and there is an equivocal second lesion at the left posterior vertex. In addition, small intraparenchymal metastases are not excluded. Consider follow-up MRI with contrast. Signer Name: Martina Malin MD  Signed: 8/19/2021 9:21 PM  Workstation Name: [id number]  [facility]    CT Abdomen Pelvis With Contrast    Result Date: 8/19/2021  CT Abdomen Pelvis W, CTA Chest INDICATION: Mental status change, endometrial carcinoma, assess for progression TECHNIQUE: CTA of the chest. CT of the abdomen and pelvis without IV contrast. Coronal and sagittal reconstructions were obtained.  Radiation dose reduction techniques included automated exposure control or exposure modulation based on body size. Count of known CT and cardiac nuc med studies performed in previous 12 months: 0. COMPARISON: CT of the abdomen from 7/2/21 FINDINGS: CTA chest: There is no definite evidence of pulmonary embolism although there is somewhat limitation of evaluation for small subsegmental embolus. The patient's left  supraclavicular lymphadenopathy and left low cervical lymphadenopathy has significantly increased in size and there is shift of the trachea to the right. Multilevel mediastinal nodes have significantly increased in size and there is significant narrowing of the eleonora and mainstem bronchi bilaterally. The esophageal lumen is also compressed and may be significantly involved. There are new small bilateral pleural effusions, greater on the right as well as multiple new lung metastases noted bilaterally. Bone marrow attenuation is somewhat mottled and underlying metastatic disease cannot be entirely excluded although no definite focal lesion is identified. Abdomen: Multiple new metastatic lesions are noted throughout the liver. Lesions are noted within the bilateral adrenal glands, and the spleen is not well evaluated due to beam hardening artifact. However, there is suspicion for multiple splenic lesions as well. Multiple large jeana masses are seen scattered throughout the abdomen. There is also flattening of the inferior vena cava secondary to jeana mass. Pelvis: Malignant lesion abuts the wall of the rectum anteriorly. There is a small amount of fluid in the pelvis. Multiple abnormal aortocaval and periaortic nodes are noted. No acute bowel abnormality is appreciated. No acute osseous abnormality.     1. There has been gross interval progression of metastatic disease as described above throughout the chest, abdomen and pelvis. 2. There is no definite evidence of pulmonary embolism although there is somewhat limitation of evaluation for small subsegmental embolus. Signer Name: Martina Malin MD  Signed: 8/19/2021 9:31 PM  Workstation Name: [id number]  [facility]    FL Video Swallow With Speech Single Contrast    Result Date: 8/20/2021  EXAMINATION: FL VIDEO SWALLOW W SPEECH SINGLE-CONTRAST-, FL LIMITED UGI FOR MBS REFLUX SINGLE-CONTRAST-  INDICATION: dysphagia, known esophageal displacement per  imaging  TECHNIQUE: 1 minute and 18 seconds of fluoroscopic time was used. 8 associated fluoroscopic loops were saved. The patient was evaluated in the seated lateral position while taking a variety of consistencies of barium by mouth under the direction of speech pathology.  COMPARISON: NONE  FINDINGS: 1 minute and 18 seconds of fluoroscopy provided for a modified barium swallow, and limited upper GI series. Please see speech therapy report for full details and recommendations. There is a moderate sized filling defect seen in the area of the oral pharynx. This filling defect persists throughout multiple images, but does not appear to impede swallowing of barium, however further evaluation may be considered if clinically relevant. No focal esophageal strictures were seen. Gastroesophageal reflux was not demonstrated during this exam.       Fluoroscopy provided for a modified barium swallow, and limited upper GI series. Please see speech therapy report for full details and recommendations. Moderate sized filling defect seen in the area of the oropharynx, which does not appear to significantly impede swallowing of barium. Further evaluation may be considered if clinically relevant.    This report was finalized on 8/20/2021 4:31 PM by Dr. Jun Gonzales.      FL Limited Ugi For Mbs Reflux Single-Contrast    Result Date: 8/20/2021  EXAMINATION: FL VIDEO SWALLOW W SPEECH SINGLE-CONTRAST-, FL LIMITED UGI FOR MBS REFLUX SINGLE-CONTRAST-  INDICATION: dysphagia, known esophageal displacement per imaging  TECHNIQUE: 1 minute and 18 seconds of fluoroscopic time was used. 8 associated fluoroscopic loops were saved. The patient was evaluated in the seated lateral position while taking a variety of consistencies of barium by mouth under the direction of speech pathology.  COMPARISON: NONE  FINDINGS: 1 minute and 18 seconds of fluoroscopy provided for a modified barium swallow, and limited upper GI series. Please see speech therapy  report for full details and recommendations. There is a moderate sized filling defect seen in the area of the oral pharynx. This filling defect persists throughout multiple images, but does not appear to impede swallowing of barium, however further evaluation may be considered if clinically relevant. No focal esophageal strictures were seen. Gastroesophageal reflux was not demonstrated during this exam.       Fluoroscopy provided for a modified barium swallow, and limited upper GI series. Please see speech therapy report for full details and recommendations. Moderate sized filling defect seen in the area of the oropharynx, which does not appear to significantly impede swallowing of barium. Further evaluation may be considered if clinically relevant.    This report was finalized on 8/20/2021 4:31 PM by Dr. Jun Gonzales.      CT Angiogram Chest    Result Date: 8/19/2021  CT Abdomen Pelvis W, CTA Chest INDICATION: Mental status change, endometrial carcinoma, assess for progression TECHNIQUE: CTA of the chest. CT of the abdomen and pelvis without IV contrast. Coronal and sagittal reconstructions were obtained.  Radiation dose reduction techniques included automated exposure control or exposure modulation based on body size. Count of known CT and cardiac nuc med studies performed in previous 12 months: 0. COMPARISON: CT of the abdomen from 7/2/21 FINDINGS: CTA chest: There is no definite evidence of pulmonary embolism although there is somewhat limitation of evaluation for small subsegmental embolus. The patient's left supraclavicular lymphadenopathy and left low cervical lymphadenopathy has significantly increased in size and there is shift of the trachea to the right. Multilevel mediastinal nodes have significantly increased in size and there is significant narrowing of the eleonora and mainstem bronchi bilaterally. The esophageal lumen is also compressed and may be significantly involved. There are new small bilateral  pleural effusions, greater on the right as well as multiple new lung metastases noted bilaterally. Bone marrow attenuation is somewhat mottled and underlying metastatic disease cannot be entirely excluded although no definite focal lesion is identified. Abdomen: Multiple new metastatic lesions are noted throughout the liver. Lesions are noted within the bilateral adrenal glands, and the spleen is not well evaluated due to beam hardening artifact. However, there is suspicion for multiple splenic lesions as well. Multiple large jeana masses are seen scattered throughout the abdomen. There is also flattening of the inferior vena cava secondary to jeana mass. Pelvis: Malignant lesion abuts the wall of the rectum anteriorly. There is a small amount of fluid in the pelvis. Multiple abnormal aortocaval and periaortic nodes are noted. No acute bowel abnormality is appreciated. No acute osseous abnormality.     1. There has been gross interval progression of metastatic disease as described above throughout the chest, abdomen and pelvis. 2. There is no definite evidence of pulmonary embolism although there is somewhat limitation of evaluation for small subsegmental embolus. Signer Name: Martina Malin MD  Signed: 8/19/2021 9:31 PM  Workstation Name: [id number]  [facility]            Discharge Details        Discharge Medications      New Medications      Instructions Start Date   levETIRAcetam 1000 MG tablet  Commonly known as: Keppra   1,000 mg, Oral, 2 Times Daily         Changes to Medications      Instructions Start Date   dexamethasone 4 MG tablet  Commonly known as: DECADRON  What changed:   · how much to take  · how to take this  · when to take this  · additional instructions   4 mg, Oral, 2 Times Daily With Meals      oxyCODONE-acetaminophen  MG per tablet  Commonly known as: Percocet  What changed:   · how much to take  · reasons to take this   1 tablet, Oral, Every 6 Hours PRN          Continue These Medications      Instructions Start Date   acetaminophen 325 MG tablet  Commonly known as: Tylenol   650 mg, Oral, Every 6 Hours PRN      Black Cohosh 40 MG capsule   2 tablets, Oral, Daily      busPIRone 10 MG tablet  Commonly known as: BUSPAR   10 mg, Oral, 3 Times Daily      docusate sodium 100 MG capsule  Commonly known as: COLACE   100 mg, Oral, 2 Times Daily      Farxiga 10 MG tablet  Generic drug: Dapagliflozin Propanediol   10 mg, Oral, Daily      gabapentin 300 MG capsule  Commonly known as: NEURONTIN   600 mg, Oral, 3 Times Daily      glimepiride 2 MG tablet  Commonly known as: AMARYL   2 mg, Oral, 2 times daily      HumuLIN R U-500 KwikPen 500 UNIT/ML solution pen-injector CONCENTRATED injection  Generic drug: Insulin Regular Human (Conc)   Subcutaneous, 3 Times Daily Before Meals, 180 units before breakfast, 190 units before lunch and supper      ibuprofen 800 MG tablet  Commonly known as: ADVIL,MOTRIN   800 mg, Oral, Every 8 Hours PRN      levothyroxine 125 MCG tablet  Commonly known as: SYNTHROID, LEVOTHROID   250 mcg, Oral, Take As Directed, Take 2 tablets six days a week      lisinopril 20 MG tablet  Commonly known as: PRINIVIL,ZESTRIL   20 mg, Oral, Daily      loratadine 10 MG tablet  Commonly known as: Claritin   Take 1 tab the night before chemo then take 1 tab the morning of chemo.      megestrol 40 MG tablet  Commonly known as: MEGACE   80 mg, Oral, 2 Times Daily      metFORMIN  MG 24 hr tablet  Commonly known as: GLUCOPHAGE-XR   2,000 mg, Oral, Nightly      mirtazapine 15 MG tablet  Commonly known as: REMERON   15 mg, Oral, Nightly      Morphine 30 MG 12 hr tablet  Commonly known as: MS Contin   30 mg, Oral, 2 Times Daily, 1 tablet every 12 hours      multivitamin tablet tablet   1 tablet, Oral, Daily      OMEGA-3 FATTY ACIDS PO   2 tablets, Oral, 2 times daily      ondansetron 8 MG tablet  Commonly known as: ZOFRAN   8 mg, Oral, 3 Times Daily PRN      oxyCODONE 10 MG  tablet  Commonly known as: ROXICODONE   Take 1-2 tablets by mouth Every 6 (Six) Hours As Needed for pain      PARoxetine 40 MG tablet  Commonly known as: PAXIL   40 mg, Oral, Daily      polyethylene glycol 17 GM/SCOOP powder  Commonly known as: MIRALAX   17 g, Oral, Daily, As needed for constipation if no bowel movement in 2 days      PrilOSEC 20 MG capsule  Generic drug: omeprazole   20 mg, Oral, Daily      prochlorperazine 10 MG tablet  Commonly known as: COMPAZINE   10 mg, Oral, Every 4 Hours PRN      rosuvastatin 40 MG tablet  Commonly known as: CRESTOR   40 mg, Oral, Every Evening      Senna 8.6 MG capsule   1 capsule, Oral, Daily             Allergies   Allergen Reactions   • Codeine Nausea Only and Dizziness         Discharge Disposition:  Home or Self Care    Diet:  Hospital:  Diet Order   Procedures   • Diet Soft Texture; Whole Foods; Consistent Carbohydrate       Activity:  Activity Instructions     Activity as Tolerated            CODE STATUS:    Code Status and Medical Interventions:   Ordered at: 08/20/21 0747     Limited Support to NOT Include:    Intubation    Cardioversion/Defibrillation     Code Status:    No CPR     Medical Interventions (Level of Support Prior to Arrest):    Limited       Future Appointments   Date Time Provider Department Center   9/21/2021 10:30 AM Eli Baker, MAKAYLA HOOPER Y[facility] None       Additional Instructions for the Follow-ups that You Need to Schedule     Discharge Follow-up with Specified Provider: Hospice and Dr. Jensen as needed   As directed      To: Hospice and Dr. Jensen as needed                     Trang Holbrook MD  08/23/21      Time Spent on Discharge:  I spent  32 minutes on this discharge activity which included: face-to-face encounter with the patient, reviewing the data in the system, coordination of the care with the nursing staff as well as consultants, documentation, and entering orders.            Electronically signed by Trang Holbrook MD at  08/24/21 1119

## 2021-09-21 ENCOUNTER — HOSPITAL ENCOUNTER (OUTPATIENT)
Dept: RADIATION ONCOLOGY | Facility: HOSPITAL | Age: 51
Setting detail: RADIATION/ONCOLOGY SERIES
End: 2021-09-21

## (undated) DEVICE — ENDOPATH PNEUMONEEDLE INSUFFLATION NEEDLES WITH LUER LOCK CONNECTORS 120MM: Brand: ENDOPATH

## (undated) DEVICE — ENDOPOUCH RETRIEVER SPECIMEN RETRIEVAL BAGS: Brand: ENDOPOUCH RETRIEVER

## (undated) DEVICE — ENDOPATH XCEL BLADELESS TROCARS WITH STABILITY SLEEVES: Brand: ENDOPATH XCEL

## (undated) DEVICE — ANTIBACTERIAL UNDYED BRAIDED (POLYGLACTIN 910), SYNTHETIC ABSORBABLE SUTURE: Brand: COATED VICRYL

## (undated) DEVICE — COLUMN DRAPE

## (undated) DEVICE — SPNG LAP PREWSH SFTPK 18X18IN STRL PK/5

## (undated) DEVICE — TISSUE RETRIEVAL SYSTEM: Brand: INZII RETRIEVAL SYSTEM

## (undated) DEVICE — MANIP UTER RUMI TP 5.1MM 6CM LAV

## (undated) DEVICE — SYR LUERLOK 5CC

## (undated) DEVICE — CANNULA SEAL

## (undated) DEVICE — DRSNG TELFA PAD NONADH STR 1S 3X8IN

## (undated) DEVICE — TIP COVER ACCESSORY

## (undated) DEVICE — APPL CHLORAPREP TINTED 26ML TEAL

## (undated) DEVICE — SHEET, DRAPE, SPLIT, STERILE: Brand: MEDLINE

## (undated) DEVICE — DISH PETRI 3.5IN MD STRL LF

## (undated) DEVICE — BLANKT WARM UPPR/BDY ARM/OUT 57X196CM

## (undated) DEVICE — IRRIGATOR BULB ASEPTO 60CC STRL

## (undated) DEVICE — SUT MNCRYL PLS ANTIB UD 3/0 PS2 27IN

## (undated) DEVICE — MAX-CORE® DISPOSABLE CORE BIOPSY INSTRUMENT, 14G X 16CM: Brand: MAX-CORE

## (undated) DEVICE — GLV SURG SENSICARE W/ALOE PF LF 6.5 STRL

## (undated) DEVICE — MEDI-VAC YANKAUER SUCTION HANDLE W/BULBOUS TIP: Brand: CARDINAL HEALTH

## (undated) DEVICE — TUBING, SUCTION, 1/4" X 12', STRAIGHT: Brand: MEDLINE

## (undated) DEVICE — ST TBG CONN PNEUMOCLEAR EVAC SMOKE HEAT/HUMID

## (undated) DEVICE — ARM DRAPE

## (undated) DEVICE — CATH URETRL FLXITP POLLACK STD 5F 70CM

## (undated) DEVICE — ADHS SKIN PREMIERPRO EXOFIN TOPICAL HI/VISC .5ML

## (undated) DEVICE — KT POSTN TRENDELENBURG NBXL PAD WING STRAP TABL HDRST XLG

## (undated) DEVICE — ANTIBACTERIAL UNDYED BRAIDED (POLYGLACTIN 910), SYNTHETIC ABSORBABLE SURGICAL SUTURE: Brand: COATED VICRYL

## (undated) DEVICE — PATIENT RETURN ELECTRODE, SINGLE-USE, CONTACT QUALITY MONITORING, ADULT, WITH 9FT CORD, FOR PATIENTS WEIGING OVER 33LBS. (15KG): Brand: MEGADYNE

## (undated) DEVICE — MANIP UTER RUMI 2 KOH EFFICIENT 2.5CM BL

## (undated) DEVICE — KIT INCLUDES:GTB14, ALEXIS CONTAINED EXTRACTION SYSTEMC0R47, 12X100 KII BALLOON BLUNT TIP TROCAR: Brand: ALEXIS CONTAINED EXTRACTION SYSTEM WITH KII BALLOON BLUNT TIP SYSTEM

## (undated) DEVICE — UNDERGLV SURG BIOGEL INDICAT PF 61/2 GRN

## (undated) DEVICE — PK MAJ GYN DAVINCI 10

## (undated) DEVICE — BLADELESS OBTURATOR: Brand: WECK VISTA